# Patient Record
Sex: MALE | Race: OTHER | NOT HISPANIC OR LATINO | URBAN - METROPOLITAN AREA
[De-identification: names, ages, dates, MRNs, and addresses within clinical notes are randomized per-mention and may not be internally consistent; named-entity substitution may affect disease eponyms.]

---

## 2021-02-08 PROBLEM — Z00.00 ENCOUNTER FOR PREVENTIVE HEALTH EXAMINATION: Status: ACTIVE | Noted: 2021-02-08

## 2022-02-11 ENCOUNTER — OUTPATIENT (OUTPATIENT)
Dept: OUTPATIENT SERVICES | Facility: HOSPITAL | Age: 82
LOS: 1 days | End: 2022-02-11
Payer: COMMERCIAL

## 2022-02-11 VITALS
TEMPERATURE: 98 F | RESPIRATION RATE: 16 BRPM | SYSTOLIC BLOOD PRESSURE: 126 MMHG | WEIGHT: 179.9 LBS | DIASTOLIC BLOOD PRESSURE: 64 MMHG | HEART RATE: 73 BPM | OXYGEN SATURATION: 96 % | HEIGHT: 64 IN

## 2022-02-11 DIAGNOSIS — I10 ESSENTIAL (PRIMARY) HYPERTENSION: ICD-10-CM

## 2022-02-11 DIAGNOSIS — G47.33 OBSTRUCTIVE SLEEP APNEA (ADULT) (PEDIATRIC): ICD-10-CM

## 2022-02-11 DIAGNOSIS — E03.9 HYPOTHYROIDISM, UNSPECIFIED: ICD-10-CM

## 2022-02-11 DIAGNOSIS — I25.10 ATHEROSCLEROTIC HEART DISEASE OF NATIVE CORONARY ARTERY WITHOUT ANGINA PECTORIS: ICD-10-CM

## 2022-02-11 DIAGNOSIS — N18.6 END STAGE RENAL DISEASE: ICD-10-CM

## 2022-02-11 DIAGNOSIS — Z01.818 ENCOUNTER FOR OTHER PREPROCEDURAL EXAMINATION: ICD-10-CM

## 2022-02-11 DIAGNOSIS — E11.9 TYPE 2 DIABETES MELLITUS WITHOUT COMPLICATIONS: ICD-10-CM

## 2022-02-11 DIAGNOSIS — Z90.49 ACQUIRED ABSENCE OF OTHER SPECIFIED PARTS OF DIGESTIVE TRACT: Chronic | ICD-10-CM

## 2022-02-11 DIAGNOSIS — Z98.890 OTHER SPECIFIED POSTPROCEDURAL STATES: Chronic | ICD-10-CM

## 2022-02-11 DIAGNOSIS — M10.9 GOUT, UNSPECIFIED: ICD-10-CM

## 2022-02-11 DIAGNOSIS — Z29.9 ENCOUNTER FOR PROPHYLACTIC MEASURES, UNSPECIFIED: ICD-10-CM

## 2022-02-11 LAB
ANION GAP SERPL CALC-SCNC: 6 MMOL/L — SIGNIFICANT CHANGE UP (ref 5–17)
APTT BLD: 32.1 SEC — SIGNIFICANT CHANGE UP (ref 27.5–35.5)
BUN SERPL-MCNC: 39 MG/DL — HIGH (ref 7–18)
CALCIUM SERPL-MCNC: 9.1 MG/DL — SIGNIFICANT CHANGE UP (ref 8.4–10.5)
CHLORIDE SERPL-SCNC: 100 MMOL/L — SIGNIFICANT CHANGE UP (ref 96–108)
CO2 SERPL-SCNC: 31 MMOL/L — SIGNIFICANT CHANGE UP (ref 22–31)
CREAT SERPL-MCNC: 5.49 MG/DL — HIGH (ref 0.5–1.3)
GLUCOSE SERPL-MCNC: 129 MG/DL — HIGH (ref 70–99)
HCT VFR BLD CALC: 34.4 % — LOW (ref 39–50)
HGB BLD-MCNC: 11.5 G/DL — LOW (ref 13–17)
INR BLD: 0.92 RATIO — SIGNIFICANT CHANGE UP (ref 0.88–1.16)
MCHC RBC-ENTMCNC: 30.7 PG — SIGNIFICANT CHANGE UP (ref 27–34)
MCHC RBC-ENTMCNC: 33.4 GM/DL — SIGNIFICANT CHANGE UP (ref 32–36)
MCV RBC AUTO: 92 FL — SIGNIFICANT CHANGE UP (ref 80–100)
NRBC # BLD: 0 /100 WBCS — SIGNIFICANT CHANGE UP (ref 0–0)
PLATELET # BLD AUTO: 200 K/UL — SIGNIFICANT CHANGE UP (ref 150–400)
POTASSIUM SERPL-MCNC: 4.7 MMOL/L — SIGNIFICANT CHANGE UP (ref 3.5–5.3)
POTASSIUM SERPL-SCNC: 4.7 MMOL/L — SIGNIFICANT CHANGE UP (ref 3.5–5.3)
PROTHROM AB SERPL-ACNC: 11 SEC — SIGNIFICANT CHANGE UP (ref 10.6–13.6)
RBC # BLD: 3.74 M/UL — LOW (ref 4.2–5.8)
RBC # FLD: 13.2 % — SIGNIFICANT CHANGE UP (ref 10.3–14.5)
SODIUM SERPL-SCNC: 137 MMOL/L — SIGNIFICANT CHANGE UP (ref 135–145)
WBC # BLD: 8.2 K/UL — SIGNIFICANT CHANGE UP (ref 3.8–10.5)
WBC # FLD AUTO: 8.2 K/UL — SIGNIFICANT CHANGE UP (ref 3.8–10.5)

## 2022-02-11 PROCEDURE — 36415 COLL VENOUS BLD VENIPUNCTURE: CPT

## 2022-02-11 PROCEDURE — 87640 STAPH A DNA AMP PROBE: CPT

## 2022-02-11 PROCEDURE — 80048 BASIC METABOLIC PNL TOTAL CA: CPT

## 2022-02-11 PROCEDURE — G0463: CPT

## 2022-02-11 PROCEDURE — 85027 COMPLETE CBC AUTOMATED: CPT

## 2022-02-11 PROCEDURE — 87641 MR-STAPH DNA AMP PROBE: CPT

## 2022-02-11 PROCEDURE — 71046 X-RAY EXAM CHEST 2 VIEWS: CPT | Mod: 26

## 2022-02-11 PROCEDURE — 85610 PROTHROMBIN TIME: CPT

## 2022-02-11 PROCEDURE — 71046 X-RAY EXAM CHEST 2 VIEWS: CPT

## 2022-02-11 PROCEDURE — 93005 ELECTROCARDIOGRAM TRACING: CPT

## 2022-02-11 PROCEDURE — 85730 THROMBOPLASTIN TIME PARTIAL: CPT

## 2022-02-11 RX ORDER — SODIUM CHLORIDE 9 MG/ML
3 INJECTION INTRAMUSCULAR; INTRAVENOUS; SUBCUTANEOUS EVERY 8 HOURS
Refills: 0 | Status: DISCONTINUED | OUTPATIENT
Start: 2022-02-18 | End: 2022-02-25

## 2022-02-11 NOTE — H&P PST ADULT - PROBLEM SELECTOR PLAN 8
Pt had one cardia stent placed 6 years ago on asa and Plavix. Pt to continue asa am of surgery and stop Plavix three days before surgery. Pt will follow up with PCP post procedure.

## 2022-02-11 NOTE — H&P PST ADULT - PROBLEM SELECTOR PLAN 2
Pt to continue the regime of Metoprolol Amlodipine to be taken with sip of water am of surgery. Pt to follow up with PCP after surgery

## 2022-02-11 NOTE — H&P PST ADULT - PROBLEM SELECTOR PLAN 4
Pt told not to take diabetic medication am of surgery given on written instructions and will monitor blood glucose day of surgery. Pt will continue with medication after surgery as before and follow up with PCP

## 2022-02-11 NOTE — H&P PST ADULT - NSICDXPASTSURGICALHX_GEN_ALL_CORE_FT
PAST SURGICAL HISTORY:  History of cholecystectomy     S/P arteriovenous (AV) fistula creation 1 yr ago

## 2022-02-11 NOTE — H&P PST ADULT - HISTORY OF PRESENT ILLNESS
[age] yr old male with history of CAD (one cardiac stent 6 years ago plavix) hypothyroid (levothyroxine) HDL (atorvastatin) diabetes (glipizide Tradjenta )gout Allopurinol, high blood pressure (Metoprolol) ESRD had right upper arm av fistula placed has positive bruit and thrill however it is difficult to have dialysis due to position of the graft. Pt is receiving  hemo via right chest wall catheter (T<TH SAT) Pt is schedule for right arm basilic transposition AV  fistula creation on 2/18/2022. Pt walks with cane due to arthritis of the hip.

## 2022-02-11 NOTE — H&P PST ADULT - PROBLEM SELECTOR PLAN 3
Pt to continue taking Levothyroxine am of surgery with sip of water and follow up with pcp post procedure

## 2022-02-11 NOTE — H&P PST ADULT - NSICDXPASTMEDICALHX_GEN_ALL_CORE_FT
PAST MEDICAL HISTORY:  CAD (coronary artery disease) one stent 6 yrs    DM (diabetes mellitus)     End stage renal disease     Gout     History of BPH     HLD (hyperlipidemia)     HTN (hypertension)

## 2022-02-11 NOTE — H&P PST ADULT - PROBLEM SELECTOR PLAN 1
pt given written and verbal instructions on the 4% chlorhexidene wash on the day of surgery 2/18/2022

## 2022-02-11 NOTE — H&P PST ADULT - NSICDXFAMILYHX_GEN_ALL_CORE_FT
FAMILY HISTORY:  Father  Still living? No  Known health problems: none, Age at diagnosis: Age Unknown    Mother  Still living? No  Known health problems: none, Age at diagnosis: Age Unknown

## 2022-02-11 NOTE — H&P PST ADULT - ASSESSMENT
81 yr old male with history of CAD (one cardiac stent 6 years ago plavix) hypothyroid (levothyroxine) HDL (atorvastatin) diabetes (glipizide Tradjenta )gout Allopurinol, high blood pressure (Metoprolol) ESRD had right upper arm av fistula placed has positive bruit and thrill however it is difficult to have dialysis due to position of the graft. Pt is receiving  hemo via right chest wall catheter (T<TH SAT) Pt is schedule for right arm basilic transposition AV  fistula creation on 2/18/2022. Pt walks with cane due to arthritis of the hip.

## 2022-02-12 LAB
MRSA PCR RESULT.: SIGNIFICANT CHANGE UP
S AUREUS DNA NOSE QL NAA+PROBE: SIGNIFICANT CHANGE UP

## 2022-02-15 NOTE — CONSULT NOTE ADULT - SUBJECTIVE AND OBJECTIVE BOX
DATE OF SERVICE:  02/15/2022  Patient was seen,examined and evaluated  by me.ER evaluation, Labs and Hospital course was reviewed,    CHIEF COMPLAINT:AVF    HPI:81 yr old male with history of CAD (one cardiac stent 6 years ago plavix) hypothyroid (levothyroxine) HDL (atorvastatin) diabetes (glipizide Tradjenta )gout Allopurinol, high blood pressure (Metoprolol) ESRD had right upper arm av fistula placed has positive bruit and thrill however it is difficult to have dialysis due to position of the graft. Pt is receiving  hemo via right chest wall catheter (T<TH SAT) Pt is schedule for right arm basilic transposition AV  fistula creation on 2/18/2022. Pt walks with cane due to arthritis of the hip.  Walks with a cane 3-4 blocks limited due to back pain no chest pain dyspnea    PAST MEDICAL & SURGICAL HISTORY:  HTN (hypertension)    Gout    DM (diabetes mellitus)    History of BPH    HLD (hyperlipidemia)    CAD (coronary artery disease)  one stent 6 yrs    End stage renal disease    History of cholecystectomy    S/P arteriovenous (AV) fistula creation  1 yr ago        MEDICATIONS  (STANDING):  · 	allopurinol 100 mg oral tablet: Last Dose Taken:  , 1 tab(s) orally 2 times a day  · 	Low Dose ASA 81 mg oral tablet: Last Dose Taken:  , orally once a day  · 	amLODIPine 5 mg oral tablet: Last Dose Taken:  , 1 tab(s) orally once a day  · 	clopidogrel 75 mg oral tablet: Last Dose Taken:  , 1 tab(s) orally once a day  · 	calcium acetate 667 mg oral tablet: Last Dose Taken:  , 3 tab(s) orally 3 times a day  · 	Tradjenta 5 mg oral tablet: 1 tab(s) orally once a day  · 	tamsulosin 0.4 mg oral capsule: 1 cap(s) orally once a day  · 	metoprolol succinate 50 mg oral tablet, extended release: 1 tab(s) orally once a day  · 	gabapentin 100 mg oral tablet: Last Dose Taken:  , orally 2 times a day  · 	atorvastatin 40 mg oral tablet: Last Dose Taken:  , 1 tab(s) orally once a day  · 	glipiZIDE 5 mg oral tablet, extended release: 1 tab(s) orally once a day  · 	levothyroxine 50 mcg (0.05 mg) oral tablet: 1 tab(s) orally once a day    FAMILY HISTORY:  Known health problems: none (Father, Mother)      No family history of premature coronary artery disease or sudden cardiac death    SOCIAL HISTORY:  Smoking-[ ] Active  [ ] Former [x ] Non Smoker  Alcohol-[x ] Denies [ ] Social [ ] Daily  Ilicit Drug use-[x ] Denies [ ] Active user    REVIEW OF SYSTEMS:  Constitutional: [ ] fever, [ ]weight loss, [x ]fatigue   Activity [ ] Bedbound,[x ] Ambulates [ ] Unassisted[x ] Cane/Walker [ ] Assistence.  Effort tolerance:[ ] Excellent [ ] Good [ ] Fair [ ] Poor [ ]  Eyes: [ ] visual changes  Respiratory: [ ]shortness of breath;  [ ] cough, [ ]wheezing, [ ]chills, [ ]hemoptysis  Cardiovascular: [ ] chest pain, [ ]palpitations, [ ]dizziness,  [ ]leg swelling[ ]orthopnea [ ]PND  Gastrointestinal: [ ] abdominal pain, [ ]nausea, [ ]vomiting,  [ ]diarrhea,[ ]constipation  Genitourinary: [ ] dysuria, [ ] hematuria  Neurologic: [ ] headaches [ ] tremors[ ] weakness  Skin: [ ] itching, [ ]burning, [ ] rashes  Endocrine: [ ] heat or cold intolerance  Musculoskeletal: [ ] joint pain or swelling; [ ] muscle, back, or extremity pain  Psychiatric: [ ] depression, [ ]anxiety, [ ]mood swings, or [ ]difficulty sleeping  Hematologic: [ ] easy bruising, [ ] bleeding gums       [ x] All others negative	  [ ] Unable to obtain    Vital Signs Last 24 Hrs  · Temp (F)	98.2 Degrees F  · Temp (C)	36.8 Degrees C  · Heart Rate	73 /min  · Respiration Rate (breaths/min)	16 /min  · BP Systolic	126 mm Hg  · BP Diastolic	64 mm Hg  · Blood Pressure - Method	electronic  · BP Noninvasive Mean	84 mm Hg  · SpO2 (%)	96 %  · O2 Delivery/Oxygen Delivery Method	room air      PHYSICAL EXAM:  General: No acute distress BMI-28  HEENT: EOMI, PERRL[ ] Icteric  Neck: Supple, No JVD  Lungs: Equal air entry bilaterally; [ ] Rales [ ] Rhonchi [ ] Wheezing  Heart: Regular rate and rhythm;[x ] Murmurs-  2 /6 [x ] Systolic [ ] Diastolic [ ] Radiation,No rubs, or gallops  Abdomen: Nontender, bowel sounds present  Extremities: No clubbing, cyanosis, or edema[ ] Calf tenderness  Nervous system:  Alert & Oriented X3, no focal deficits  Psychiatric: Normal affect  Skin: No rashes or lesions      LABS:        Creatinine Trend: 5.49<--        Complete Blood Count (02.11.22 @ 15:11)   Nucleated RBC: 0 /100 WBCs WBC Count: 8.20 K/uL   RBC Count: 3.74 M/uL   Hemoglobin: 11.5 g/dL   Hematocrit: 34.4 %   Mean Cell Volume: 92.0 fl   Mean Cell Hemoglobin: 30.7 pg   Mean Cell Hemoglobin Conc: 33.4 gm/dL   Platelet Count - Automated: 200 K/uL     Basic Metabolic Panel (02.11.22 @ 15:11)   Sodium, Serum: 137 mmol/L   Potassium, Serum: 4.7 mmol/L   Chloride, Serum: 100 mmol/L   Carbon Dioxide, Serum: 31 mmol/L   Anion Gap, Serum: 6 mmol/L   Blood Urea Nitrogen, Serum: 39 mg/dL   Creatinine, Serum: 5.49 mg/dL   Glucose, Serum: 129 mg/dL   Calcium, Total Serum: 9.1 mg/dL   eGFR if Non : 9:  ,  RADIOLOGY:CXR  IMPRESSION:  No radiographic evidence of active chest disease..      ECG [my interpretation]: Normal sinus rhythm at 70 BPM  Left axis deviation  Inferior infarct , age undetermined  Anteroseptal infarct , age undetermined  Abnormal ECG

## 2022-02-15 NOTE — CONSULT NOTE ADULT - TIME BILLING
- Review of records, telemetry, vital signs and daily labs.   - General and cardiovascular physical examination.  - Generation of cardiovascular treatment plan.  - Coordination of care.      Patient was seen and examined by me on 02/15/2022,interim events noted,labs and radiology studies reviewed.  Sheldon Burrows MD,FACC.  [address] [place], [place] [id number].  [phone]

## 2022-02-15 NOTE — CONSULT NOTE ADULT - ASSESSMENT
81 yr old male with history of CAD (one cardiac stent 6 years ago plavix) hypothyroid (levothyroxine) HDL (atorvastatin) diabetes (glipizide Tradjenta )gout Allopurinol, high blood pressure (Metoprolol) ESRD had right upper arm av fistula placed has positive bruit and thrill however it is difficult to have dialysis due to position of the graft. Pt is receiving  hemo via right chest wall catheter (T<TH SAT) Pt is schedule for right arm basilic transposition AV  fistula creation on 2/18/2022. Pt walks with cane due to arthritis of the hip.        Problem/Plan - 1:  ·  Problem: Need for prophylactic measure.   ·  Plan: pt given written and verbal instructions on the 4% chlorhexidene wash on the day of surgery 2/18/2022.    Problem/Plan - 2:  ·  Problem: HTN (hypertension).   ·  Plan: Pt to continue the regime of Metoprolol Amlodipine to be taken with sip of water am of surgery. Pt to follow up with PCP after surgery.    Problem/Plan - 3:  ·  Problem: Hypothyroid.   ·  Plan: Pt to continue taking Levothyroxine am of surgery with sip of water and follow up with pcp post procedure.    Problem/Plan - 4:  ·  Problem: DM (diabetes mellitus).   ·  Plan: Pt told not to take diabetic medication am of surgery given on written instructions and will monitor blood glucose day of surgery. Pt will continue with medication after surgery as before and follow up with PCP.    Problem/Plan - 5:  ·  Problem: Gout.   ·  Plan: Pt to hold medication until after surgery and than continue post procedure and follow up with pcp.    Problem/Plan - 6:  ·  Problem: ESRD on dialysis.   ·  Plan: Schedule for right arm basilic vein transposition AV fistula creation.  Patient's Revised Cardiac Risk Index (RCRI) score is 1 (6 .0 % risk) . Patient is at INTERMEDIATE  risk of  adverse perioperative cardiac events undergoing INTERMEDIATE  risk surgery. No further cardiac testing is needed prior to patient's surgery.       Problem/Plan - 7:  ·  Problem: ARYAN (obstructive sleep apnea).   ·  Plan: stop bang score=3 pt intermediate risk for ARYAN will monitor safely of patent airway.    Problem/Plan - 8:  ·  Problem: CAD (coronary artery disease).   ·  Plan: Pt had one cardia stent placed 6 years ago on asa and Plavix. Pt to continue asa am of surgery and stop Plavix three days before surgery.

## 2022-02-17 ENCOUNTER — TRANSCRIPTION ENCOUNTER (OUTPATIENT)
Age: 82
End: 2022-02-17

## 2022-02-18 ENCOUNTER — OUTPATIENT (OUTPATIENT)
Dept: OUTPATIENT SERVICES | Facility: HOSPITAL | Age: 82
LOS: 1 days | End: 2022-02-18
Payer: COMMERCIAL

## 2022-02-18 VITALS
SYSTOLIC BLOOD PRESSURE: 139 MMHG | TEMPERATURE: 99 F | DIASTOLIC BLOOD PRESSURE: 71 MMHG | RESPIRATION RATE: 16 BRPM | OXYGEN SATURATION: 96 % | HEART RATE: 70 BPM

## 2022-02-18 VITALS
HEIGHT: 64 IN | TEMPERATURE: 98 F | WEIGHT: 179.9 LBS | HEART RATE: 89 BPM | SYSTOLIC BLOOD PRESSURE: 152 MMHG | RESPIRATION RATE: 16 BRPM | OXYGEN SATURATION: 95 % | DIASTOLIC BLOOD PRESSURE: 75 MMHG

## 2022-02-18 DIAGNOSIS — Z98.890 OTHER SPECIFIED POSTPROCEDURAL STATES: Chronic | ICD-10-CM

## 2022-02-18 DIAGNOSIS — Z90.49 ACQUIRED ABSENCE OF OTHER SPECIFIED PARTS OF DIGESTIVE TRACT: Chronic | ICD-10-CM

## 2022-02-18 DIAGNOSIS — N18.6 END STAGE RENAL DISEASE: ICD-10-CM

## 2022-02-18 LAB
ANION GAP SERPL CALC-SCNC: 5 MMOL/L — SIGNIFICANT CHANGE UP (ref 5–17)
BUN SERPL-MCNC: 30 MG/DL — HIGH (ref 7–18)
CALCIUM SERPL-MCNC: 9.2 MG/DL — SIGNIFICANT CHANGE UP (ref 8.4–10.5)
CHLORIDE SERPL-SCNC: 99 MMOL/L — SIGNIFICANT CHANGE UP (ref 96–108)
CO2 SERPL-SCNC: 32 MMOL/L — HIGH (ref 22–31)
CREAT SERPL-MCNC: 5.38 MG/DL — HIGH (ref 0.5–1.3)
GLUCOSE BLDC GLUCOMTR-MCNC: 130 MG/DL — HIGH (ref 70–99)
GLUCOSE BLDC GLUCOMTR-MCNC: 135 MG/DL — HIGH (ref 70–99)
GLUCOSE SERPL-MCNC: 133 MG/DL — HIGH (ref 70–99)
POTASSIUM SERPL-MCNC: 5.2 MMOL/L — SIGNIFICANT CHANGE UP (ref 3.5–5.3)
POTASSIUM SERPL-SCNC: 5.2 MMOL/L — SIGNIFICANT CHANGE UP (ref 3.5–5.3)
SARS-COV-2 RNA SPEC QL NAA+PROBE: SIGNIFICANT CHANGE UP
SODIUM SERPL-SCNC: 136 MMOL/L — SIGNIFICANT CHANGE UP (ref 135–145)

## 2022-02-18 PROCEDURE — 87635 SARS-COV-2 COVID-19 AMP PRB: CPT

## 2022-02-18 PROCEDURE — 36415 COLL VENOUS BLD VENIPUNCTURE: CPT

## 2022-02-18 PROCEDURE — 36819 AV FUSE UPPR ARM BASILIC: CPT | Mod: RT

## 2022-02-18 PROCEDURE — 82962 GLUCOSE BLOOD TEST: CPT

## 2022-02-18 PROCEDURE — C1889: CPT

## 2022-02-18 PROCEDURE — 80048 BASIC METABOLIC PNL TOTAL CA: CPT

## 2022-02-18 DEVICE — CLIP APPLIER COVIDIEN SURGICLIP II 9.75" MEDIUM: Type: IMPLANTABLE DEVICE | Site: RIGHT | Status: FUNCTIONAL

## 2022-02-18 DEVICE — CLIP APPLIER COVIDIEN SURGICLIP III 9" SM: Type: IMPLANTABLE DEVICE | Site: RIGHT | Status: FUNCTIONAL

## 2022-02-18 DEVICE — GELFOAM 4-3/4X3/4 SZ4: Type: IMPLANTABLE DEVICE | Site: RIGHT | Status: FUNCTIONAL

## 2022-02-18 DEVICE — SPONGE COLLAGEN AVITENE ULTRA 12.5: Type: IMPLANTABLE DEVICE | Site: RIGHT | Status: FUNCTIONAL

## 2022-02-18 RX ORDER — TRAMADOL HYDROCHLORIDE 50 MG/1
1 TABLET ORAL
Qty: 12 | Refills: 0
Start: 2022-02-18 | End: 2022-02-20

## 2022-02-18 RX ORDER — CALCIUM ACETATE 667 MG
3 TABLET ORAL
Qty: 0 | Refills: 0 | DISCHARGE

## 2022-02-18 RX ORDER — ASPIRIN/CALCIUM CARB/MAGNESIUM 324 MG
0 TABLET ORAL
Qty: 0 | Refills: 0 | DISCHARGE

## 2022-02-18 RX ORDER — LINAGLIPTIN 5 MG/1
1 TABLET, FILM COATED ORAL
Qty: 0 | Refills: 0 | DISCHARGE

## 2022-02-18 RX ORDER — ATORVASTATIN CALCIUM 80 MG/1
1 TABLET, FILM COATED ORAL
Qty: 0 | Refills: 0 | DISCHARGE

## 2022-02-18 RX ORDER — LEVOTHYROXINE SODIUM 125 MCG
1 TABLET ORAL
Qty: 0 | Refills: 0 | DISCHARGE

## 2022-02-18 RX ORDER — FENTANYL CITRATE 50 UG/ML
25 INJECTION INTRAVENOUS
Refills: 0 | Status: DISCONTINUED | OUTPATIENT
Start: 2022-02-18 | End: 2022-02-18

## 2022-02-18 RX ORDER — TAMSULOSIN HYDROCHLORIDE 0.4 MG/1
1 CAPSULE ORAL
Qty: 0 | Refills: 0 | DISCHARGE

## 2022-02-18 RX ORDER — CLOPIDOGREL BISULFATE 75 MG/1
1 TABLET, FILM COATED ORAL
Qty: 0 | Refills: 0 | DISCHARGE

## 2022-02-18 RX ORDER — GABAPENTIN 400 MG/1
0 CAPSULE ORAL
Qty: 0 | Refills: 0 | DISCHARGE

## 2022-02-18 RX ORDER — AMLODIPINE BESYLATE 2.5 MG/1
1 TABLET ORAL
Qty: 0 | Refills: 0 | DISCHARGE

## 2022-02-18 RX ORDER — ALLOPURINOL 300 MG
1 TABLET ORAL
Qty: 0 | Refills: 0 | DISCHARGE

## 2022-02-18 RX ORDER — METOPROLOL TARTRATE 50 MG
1 TABLET ORAL
Qty: 0 | Refills: 0 | DISCHARGE

## 2022-02-18 RX ORDER — CHLORHEXIDINE GLUCONATE 213 G/1000ML
1 SOLUTION TOPICAL ONCE
Refills: 0 | Status: COMPLETED | OUTPATIENT
Start: 2022-02-18 | End: 2022-02-18

## 2022-02-18 RX ADMIN — CHLORHEXIDINE GLUCONATE 1 APPLICATION(S): 213 SOLUTION TOPICAL at 07:19

## 2022-02-18 NOTE — ASU PATIENT PROFILE, ADULT - FALL HARM RISK - UNIVERSAL INTERVENTIONS
Bed in lowest position, wheels locked, appropriate side rails in place/Call bell, personal items and telephone in reach/Instruct patient to call for assistance before getting out of bed or chair/Non-slip footwear when patient is out of bed/[place] to call system/Physically safe environment - no spills, clutter or unnecessary equipment/Purposeful Proactive Rounding/Room/bathroom lighting operational, light cord in reach

## 2022-02-18 NOTE — ASU DISCHARGE PLAN (ADULT/PEDIATRIC) - CARE PROVIDER_API CALL
Yenny Razo)  Surgery; Vascular Surgery  [address] [facility], [address]  [place] [address]  Phone: [phone]  Fax: [phone]  Follow Up Time: 1 week

## 2022-02-18 NOTE — ASU DISCHARGE PLAN (ADULT/PEDIATRIC) - NS MD DC FALL RISK RISK
For information on Fall & Injury Prevention, visit: [email or web address] OR  [email or web address] OR  https://www.cdc.gov/steadi/patient.html

## 2022-02-18 NOTE — ASU PREOP CHECKLIST - SELECT TESTS ORDERED
BMP sent stat preop as per NP order/BMP/Results in MD note/COVID-19 BMP sent stat preop as per NP order/BMP/Results in MD note/POCT Blood Glucose/COVID-19

## 2022-02-18 NOTE — ASU DISCHARGE PLAN (ADULT/PEDIATRIC) - ASU DC SPECIAL INSTRUCTIONSFT
Please take over the counter pain medications as needed for pain.  Please take tylenol and motrin.  Take 650mg Tylenol every 6 hours as needed for pain.  Please take motrin 600mg every 6 hours as needed for pain.  Do not exceed more than 4000mg Tylenol in 24 hours.  Do not exceed more than 2400mg Motrin in 24 hours.      Continue to use permacath for dialysis access.  Do not use AV Fistula until told to do so in clinic.      Keep dressing in place until seen in clinic. Please take over the counter pain medications as needed for pain.  Please take tylenol and motrin.  Take 650mg Tylenol every 6 hours as needed for pain.  Please take motrin 600mg every 6 hours as needed for pain.  Do not exceed more than 4000mg Tylenol in 24 hours.  Do not exceed more than 2400mg Motrin in 24 hours.      If pain is not well controlled with Tylenol/Motrin, please take Tramadol 50mg every 6 hours as needed for more severe pain.  This has been sent to your phamacy    Continue to use permacath for dialysis access.  Do not use AV Fistula until told to do so in clinic.      Keep dressing in place until seen in clinic.

## 2022-02-18 NOTE — ASU PREOP CHECKLIST - IV STARTED
unable to place IV lock (difficult stick) --to be done by anethesiologist as per protocol (MARIETTA Henley made aware preop)/no

## 2025-02-24 ENCOUNTER — INPATIENT (INPATIENT)
Facility: HOSPITAL | Age: 85
LOS: 13 days | Discharge: NURSING, REHAB OR LONG-TERM CARE | End: 2025-03-10
Attending: STUDENT IN AN ORGANIZED HEALTH CARE EDUCATION/TRAINING PROGRAM | Admitting: THORACIC SURGERY (CARDIOTHORACIC VASCULAR SURGERY)
Payer: COMMERCIAL

## 2025-02-24 ENCOUNTER — RESULT REVIEW (OUTPATIENT)
Age: 85
End: 2025-02-24

## 2025-02-24 VITALS
RESPIRATION RATE: 14 BRPM | SYSTOLIC BLOOD PRESSURE: 176 MMHG | HEIGHT: 64 IN | OXYGEN SATURATION: 98 % | DIASTOLIC BLOOD PRESSURE: 75 MMHG | WEIGHT: 181.66 LBS | HEART RATE: 83 BPM

## 2025-02-24 DIAGNOSIS — Z98.890 OTHER SPECIFIED POSTPROCEDURAL STATES: Chronic | ICD-10-CM

## 2025-02-24 DIAGNOSIS — Z90.49 ACQUIRED ABSENCE OF OTHER SPECIFIED PARTS OF DIGESTIVE TRACT: Chronic | ICD-10-CM

## 2025-02-24 PROBLEM — I25.10 ATHEROSCLEROTIC HEART DISEASE OF NATIVE CORONARY ARTERY WITHOUT ANGINA PECTORIS: Chronic | Status: ACTIVE | Noted: 2022-02-11

## 2025-02-24 PROBLEM — I10 ESSENTIAL (PRIMARY) HYPERTENSION: Chronic | Status: ACTIVE | Noted: 2022-02-11

## 2025-02-24 PROBLEM — N18.6 END STAGE RENAL DISEASE: Chronic | Status: ACTIVE | Noted: 2022-02-11

## 2025-02-24 PROBLEM — M10.9 GOUT, UNSPECIFIED: Chronic | Status: ACTIVE | Noted: 2022-02-11

## 2025-02-24 PROBLEM — E11.9 TYPE 2 DIABETES MELLITUS WITHOUT COMPLICATIONS: Chronic | Status: ACTIVE | Noted: 2022-02-11

## 2025-02-24 PROBLEM — E78.5 HYPERLIPIDEMIA, UNSPECIFIED: Chronic | Status: ACTIVE | Noted: 2022-02-11

## 2025-02-24 PROBLEM — Z87.438 PERSONAL HISTORY OF OTHER DISEASES OF MALE GENITAL ORGANS: Chronic | Status: ACTIVE | Noted: 2022-02-11

## 2025-02-24 LAB
A1C WITH ESTIMATED AVERAGE GLUCOSE RESULT: 7.7 % — HIGH (ref 4–5.6)
ALBUMIN SERPL ELPH-MCNC: 3.4 G/DL — SIGNIFICANT CHANGE UP (ref 3.3–5)
ALBUMIN SERPL ELPH-MCNC: 3.8 G/DL — SIGNIFICANT CHANGE UP (ref 3.3–5)
ALBUMIN SERPL ELPH-MCNC: 3.9 G/DL — SIGNIFICANT CHANGE UP (ref 3.3–5)
ALBUMIN SERPL ELPH-MCNC: 3.9 G/DL — SIGNIFICANT CHANGE UP (ref 3.3–5)
ALBUMIN SERPL ELPH-MCNC: 4.1 G/DL — SIGNIFICANT CHANGE UP (ref 3.3–5)
ALP SERPL-CCNC: 172 U/L — HIGH (ref 40–120)
ALP SERPL-CCNC: 173 U/L — HIGH (ref 40–120)
ALP SERPL-CCNC: 174 U/L — HIGH (ref 40–120)
ALP SERPL-CCNC: 182 U/L — HIGH (ref 40–120)
ALP SERPL-CCNC: 196 U/L — HIGH (ref 40–120)
ALT FLD-CCNC: 18 U/L — SIGNIFICANT CHANGE UP (ref 10–45)
ALT FLD-CCNC: 19 U/L — SIGNIFICANT CHANGE UP (ref 10–45)
ALT FLD-CCNC: 19 U/L — SIGNIFICANT CHANGE UP (ref 10–45)
ALT FLD-CCNC: 20 U/L — SIGNIFICANT CHANGE UP (ref 10–45)
ALT FLD-CCNC: 23 U/L — SIGNIFICANT CHANGE UP (ref 10–45)
ANION GAP SERPL CALC-SCNC: 14 MMOL/L — SIGNIFICANT CHANGE UP (ref 5–17)
ANION GAP SERPL CALC-SCNC: 17 MMOL/L — SIGNIFICANT CHANGE UP (ref 5–17)
ANION GAP SERPL CALC-SCNC: 18 MMOL/L — HIGH (ref 5–17)
ANION GAP SERPL CALC-SCNC: 19 MMOL/L — HIGH (ref 5–17)
ANION GAP SERPL CALC-SCNC: 20 MMOL/L — HIGH (ref 5–17)
APPEARANCE UR: CLEAR — SIGNIFICANT CHANGE UP
APTT BLD: 54.9 SEC — HIGH (ref 24.5–35.6)
APTT BLD: 55 SEC — HIGH (ref 24.5–35.6)
APTT BLD: 67.3 SEC — HIGH (ref 24.5–35.6)
APTT BLD: 74.2 SEC — HIGH (ref 24.5–35.6)
AST SERPL-CCNC: 15 U/L — SIGNIFICANT CHANGE UP (ref 10–40)
AST SERPL-CCNC: 16 U/L — SIGNIFICANT CHANGE UP (ref 10–40)
AST SERPL-CCNC: 16 U/L — SIGNIFICANT CHANGE UP (ref 10–40)
AST SERPL-CCNC: 17 U/L — SIGNIFICANT CHANGE UP (ref 10–40)
AST SERPL-CCNC: 20 U/L — SIGNIFICANT CHANGE UP (ref 10–40)
BASOPHILS # BLD AUTO: 0.02 K/UL — SIGNIFICANT CHANGE UP (ref 0–0.2)
BASOPHILS NFR BLD AUTO: 0.3 % — SIGNIFICANT CHANGE UP (ref 0–2)
BILIRUB SERPL-MCNC: 0.5 MG/DL — SIGNIFICANT CHANGE UP (ref 0.2–1.2)
BILIRUB SERPL-MCNC: 0.5 MG/DL — SIGNIFICANT CHANGE UP (ref 0.2–1.2)
BILIRUB SERPL-MCNC: 0.6 MG/DL — SIGNIFICANT CHANGE UP (ref 0.2–1.2)
BILIRUB SERPL-MCNC: 0.6 MG/DL — SIGNIFICANT CHANGE UP (ref 0.2–1.2)
BILIRUB SERPL-MCNC: 0.7 MG/DL — SIGNIFICANT CHANGE UP (ref 0.2–1.2)
BILIRUB UR-MCNC: NEGATIVE — SIGNIFICANT CHANGE UP
BLD GP AB SCN SERPL QL: NEGATIVE — SIGNIFICANT CHANGE UP
BLD GP AB SCN SERPL QL: NEGATIVE — SIGNIFICANT CHANGE UP
BUN SERPL-MCNC: 16 MG/DL — SIGNIFICANT CHANGE UP (ref 7–23)
BUN SERPL-MCNC: 40 MG/DL — HIGH (ref 7–23)
BUN SERPL-MCNC: 43 MG/DL — HIGH (ref 7–23)
BUN SERPL-MCNC: 43 MG/DL — HIGH (ref 7–23)
BUN SERPL-MCNC: 45 MG/DL — HIGH (ref 7–23)
CALCIUM SERPL-MCNC: 8.6 MG/DL — SIGNIFICANT CHANGE UP (ref 8.4–10.5)
CALCIUM SERPL-MCNC: 8.9 MG/DL — SIGNIFICANT CHANGE UP (ref 8.4–10.5)
CALCIUM SERPL-MCNC: 9 MG/DL — SIGNIFICANT CHANGE UP (ref 8.4–10.5)
CALCIUM SERPL-MCNC: 9 MG/DL — SIGNIFICANT CHANGE UP (ref 8.4–10.5)
CALCIUM SERPL-MCNC: 9.1 MG/DL — SIGNIFICANT CHANGE UP (ref 8.4–10.5)
CHLORIDE SERPL-SCNC: 90 MMOL/L — LOW (ref 96–108)
CHLORIDE SERPL-SCNC: 91 MMOL/L — LOW (ref 96–108)
CHLORIDE SERPL-SCNC: 91 MMOL/L — LOW (ref 96–108)
CHLORIDE SERPL-SCNC: 94 MMOL/L — LOW (ref 96–108)
CHLORIDE SERPL-SCNC: 96 MMOL/L — SIGNIFICANT CHANGE UP (ref 96–108)
CHOLEST SERPL-MCNC: 150 MG/DL — SIGNIFICANT CHANGE UP
CK MB CFR SERPL CALC: 4.2 NG/ML — SIGNIFICANT CHANGE UP (ref 0–6.7)
CK MB CFR SERPL CALC: 6.2 NG/ML — SIGNIFICANT CHANGE UP (ref 0–6.7)
CK SERPL-CCNC: 96 U/L — SIGNIFICANT CHANGE UP (ref 30–200)
CO2 SERPL-SCNC: 26 MMOL/L — SIGNIFICANT CHANGE UP (ref 22–31)
CO2 SERPL-SCNC: 28 MMOL/L — SIGNIFICANT CHANGE UP (ref 22–31)
CO2 SERPL-SCNC: 28 MMOL/L — SIGNIFICANT CHANGE UP (ref 22–31)
CO2 SERPL-SCNC: 29 MMOL/L — SIGNIFICANT CHANGE UP (ref 22–31)
CO2 SERPL-SCNC: 29 MMOL/L — SIGNIFICANT CHANGE UP (ref 22–31)
COLOR SPEC: YELLOW — SIGNIFICANT CHANGE UP
CREAT SERPL-MCNC: 10.34 MG/DL — HIGH (ref 0.5–1.3)
CREAT SERPL-MCNC: 4.21 MG/DL — HIGH (ref 0.5–1.3)
CREAT SERPL-MCNC: 9.59 MG/DL — HIGH (ref 0.5–1.3)
CREAT SERPL-MCNC: 9.95 MG/DL — HIGH (ref 0.5–1.3)
CREAT SERPL-MCNC: 9.96 MG/DL — HIGH (ref 0.5–1.3)
DIFF PNL FLD: NEGATIVE — SIGNIFICANT CHANGE UP
EGFR: 13 ML/MIN/1.73M2 — LOW
EGFR: 13 ML/MIN/1.73M2 — LOW
EGFR: 5 ML/MIN/1.73M2 — LOW
EOSINOPHIL # BLD AUTO: 0.31 K/UL — SIGNIFICANT CHANGE UP (ref 0–0.5)
EOSINOPHIL NFR BLD AUTO: 4.3 % — SIGNIFICANT CHANGE UP (ref 0–6)
ESTIMATED AVERAGE GLUCOSE: 174 MG/DL — HIGH (ref 68–114)
GLUCOSE SERPL-MCNC: 144 MG/DL — HIGH (ref 70–99)
GLUCOSE SERPL-MCNC: 147 MG/DL — HIGH (ref 70–99)
GLUCOSE SERPL-MCNC: 184 MG/DL — HIGH (ref 70–99)
GLUCOSE SERPL-MCNC: 191 MG/DL — HIGH (ref 70–99)
GLUCOSE SERPL-MCNC: 214 MG/DL — HIGH (ref 70–99)
GLUCOSE UR QL: 250 MG/DL
HBV SURFACE AB SER-ACNC: REACTIVE — SIGNIFICANT CHANGE UP
HBV SURFACE AG SER-ACNC: SIGNIFICANT CHANGE UP
HCT VFR BLD CALC: 27.9 % — LOW (ref 39–50)
HCT VFR BLD CALC: 28.2 % — LOW (ref 39–50)
HCT VFR BLD CALC: 29.5 % — LOW (ref 39–50)
HCT VFR BLD CALC: 30.5 % — LOW (ref 39–50)
HCT VFR BLD CALC: 30.8 % — LOW (ref 39–50)
HCV AB S/CO SERPL IA: 0.05 S/CO — SIGNIFICANT CHANGE UP
HCV AB SERPL-IMP: SIGNIFICANT CHANGE UP
HDLC SERPL-MCNC: 31 MG/DL — LOW
HGB BLD-MCNC: 10 G/DL — LOW (ref 13–17)
HGB BLD-MCNC: 9 G/DL — LOW (ref 13–17)
HGB BLD-MCNC: 9 G/DL — LOW (ref 13–17)
HGB BLD-MCNC: 9.4 G/DL — LOW (ref 13–17)
HGB BLD-MCNC: 9.8 G/DL — LOW (ref 13–17)
IMM GRANULOCYTES NFR BLD AUTO: 0.4 % — SIGNIFICANT CHANGE UP (ref 0–0.9)
INR BLD: 1.01 — SIGNIFICANT CHANGE UP (ref 0.85–1.16)
INR BLD: 1.04 — SIGNIFICANT CHANGE UP (ref 0.85–1.16)
INR BLD: 1.04 — SIGNIFICANT CHANGE UP (ref 0.85–1.16)
KETONES UR-MCNC: NEGATIVE MG/DL — SIGNIFICANT CHANGE UP
LACTATE SERPL-SCNC: 1.2 MMOL/L — SIGNIFICANT CHANGE UP (ref 0.5–2)
LEUKOCYTE ESTERASE UR-ACNC: ABNORMAL
LIPID PNL WITH DIRECT LDL SERPL: 89 MG/DL — SIGNIFICANT CHANGE UP
LYMPHOCYTES # BLD AUTO: 1.72 K/UL — SIGNIFICANT CHANGE UP (ref 1–3.3)
LYMPHOCYTES # BLD AUTO: 24.1 % — SIGNIFICANT CHANGE UP (ref 13–44)
MAGNESIUM SERPL-MCNC: 2 MG/DL — SIGNIFICANT CHANGE UP (ref 1.6–2.6)
MAGNESIUM SERPL-MCNC: 2.4 MG/DL — SIGNIFICANT CHANGE UP (ref 1.6–2.6)
MAGNESIUM SERPL-MCNC: 2.4 MG/DL — SIGNIFICANT CHANGE UP (ref 1.6–2.6)
MAGNESIUM SERPL-MCNC: 2.5 MG/DL — SIGNIFICANT CHANGE UP (ref 1.6–2.6)
MCHC RBC-ENTMCNC: 30.2 PG — SIGNIFICANT CHANGE UP (ref 27–34)
MCHC RBC-ENTMCNC: 30.3 PG — SIGNIFICANT CHANGE UP (ref 27–34)
MCHC RBC-ENTMCNC: 30.6 PG — SIGNIFICANT CHANGE UP (ref 27–34)
MCHC RBC-ENTMCNC: 30.8 PG — SIGNIFICANT CHANGE UP (ref 27–34)
MCHC RBC-ENTMCNC: 31.1 PG — SIGNIFICANT CHANGE UP (ref 27–34)
MCHC RBC-ENTMCNC: 31.8 G/DL — LOW (ref 32–36)
MCHC RBC-ENTMCNC: 31.9 G/DL — LOW (ref 32–36)
MCHC RBC-ENTMCNC: 31.9 G/DL — LOW (ref 32–36)
MCHC RBC-ENTMCNC: 32.3 G/DL — SIGNIFICANT CHANGE UP (ref 32–36)
MCHC RBC-ENTMCNC: 32.8 G/DL — SIGNIFICANT CHANGE UP (ref 32–36)
MCV RBC AUTO: 92.1 FL — SIGNIFICANT CHANGE UP (ref 80–100)
MCV RBC AUTO: 95.4 FL — SIGNIFICANT CHANGE UP (ref 80–100)
MCV RBC AUTO: 96.1 FL — SIGNIFICANT CHANGE UP (ref 80–100)
MCV RBC AUTO: 96.5 FL — SIGNIFICANT CHANGE UP (ref 80–100)
MCV RBC AUTO: 96.6 FL — SIGNIFICANT CHANGE UP (ref 80–100)
MONOCYTES # BLD AUTO: 0.56 K/UL — SIGNIFICANT CHANGE UP (ref 0–0.9)
MONOCYTES NFR BLD AUTO: 7.8 % — SIGNIFICANT CHANGE UP (ref 2–14)
NEUTROPHILS # BLD AUTO: 4.51 K/UL — SIGNIFICANT CHANGE UP (ref 1.8–7.4)
NEUTROPHILS NFR BLD AUTO: 63.1 % — SIGNIFICANT CHANGE UP (ref 43–77)
NITRITE UR-MCNC: NEGATIVE — SIGNIFICANT CHANGE UP
NON HDL CHOLESTEROL: 119 MG/DL — SIGNIFICANT CHANGE UP
NRBC BLD AUTO-RTO: 0 /100 WBCS — SIGNIFICANT CHANGE UP (ref 0–0)
NT-PROBNP SERPL-SCNC: HIGH PG/ML (ref 0–300)
PA ADP PRP-ACNC: 188 PRU — SIGNIFICANT CHANGE UP (ref 182–335)
PA ADP PRP-ACNC: 206 PRU — SIGNIFICANT CHANGE UP (ref 182–335)
PH UR: 8 — SIGNIFICANT CHANGE UP (ref 5–8)
PHOSPHATE SERPL-MCNC: 2.4 MG/DL — LOW (ref 2.5–4.5)
PHOSPHATE SERPL-MCNC: 4.4 MG/DL — SIGNIFICANT CHANGE UP (ref 2.5–4.5)
PHOSPHATE SERPL-MCNC: 4.7 MG/DL — HIGH (ref 2.5–4.5)
PHOSPHATE SERPL-MCNC: 4.8 MG/DL — HIGH (ref 2.5–4.5)
PLATELET # BLD AUTO: 150 K/UL — SIGNIFICANT CHANGE UP (ref 150–400)
PLATELET # BLD AUTO: 160 K/UL — SIGNIFICANT CHANGE UP (ref 150–400)
PLATELET # BLD AUTO: 167 K/UL — SIGNIFICANT CHANGE UP (ref 150–400)
PLATELET # BLD AUTO: 175 K/UL — SIGNIFICANT CHANGE UP (ref 150–400)
PLATELET # BLD AUTO: 179 K/UL — SIGNIFICANT CHANGE UP (ref 150–400)
POTASSIUM SERPL-MCNC: 3.6 MMOL/L — SIGNIFICANT CHANGE UP (ref 3.5–5.3)
POTASSIUM SERPL-MCNC: 5.1 MMOL/L — SIGNIFICANT CHANGE UP (ref 3.5–5.3)
POTASSIUM SERPL-MCNC: 5.2 MMOL/L — SIGNIFICANT CHANGE UP (ref 3.5–5.3)
POTASSIUM SERPL-MCNC: 5.2 MMOL/L — SIGNIFICANT CHANGE UP (ref 3.5–5.3)
POTASSIUM SERPL-MCNC: 5.4 MMOL/L — HIGH (ref 3.5–5.3)
POTASSIUM SERPL-SCNC: 3.6 MMOL/L — SIGNIFICANT CHANGE UP (ref 3.5–5.3)
POTASSIUM SERPL-SCNC: 5.1 MMOL/L — SIGNIFICANT CHANGE UP (ref 3.5–5.3)
POTASSIUM SERPL-SCNC: 5.2 MMOL/L — SIGNIFICANT CHANGE UP (ref 3.5–5.3)
POTASSIUM SERPL-SCNC: 5.2 MMOL/L — SIGNIFICANT CHANGE UP (ref 3.5–5.3)
POTASSIUM SERPL-SCNC: 5.4 MMOL/L — HIGH (ref 3.5–5.3)
PROT SERPL-MCNC: 6.6 G/DL — SIGNIFICANT CHANGE UP (ref 6–8.3)
PROT SERPL-MCNC: 6.7 G/DL — SIGNIFICANT CHANGE UP (ref 6–8.3)
PROT SERPL-MCNC: 6.8 G/DL — SIGNIFICANT CHANGE UP (ref 6–8.3)
PROT SERPL-MCNC: 7 G/DL — SIGNIFICANT CHANGE UP (ref 6–8.3)
PROT SERPL-MCNC: 7.1 G/DL — SIGNIFICANT CHANGE UP (ref 6–8.3)
PROT UR-MCNC: 100 MG/DL
PROTHROM AB SERPL-ACNC: 11.8 SEC — SIGNIFICANT CHANGE UP (ref 9.9–13.4)
PROTHROM AB SERPL-ACNC: 11.9 SEC — SIGNIFICANT CHANGE UP (ref 9.9–13.4)
PROTHROM AB SERPL-ACNC: 12.2 SEC — SIGNIFICANT CHANGE UP (ref 9.9–13.4)
RBC # BLD: 2.89 M/UL — LOW (ref 4.2–5.8)
RBC # BLD: 2.92 M/UL — LOW (ref 4.2–5.8)
RBC # BLD: 3.07 M/UL — LOW (ref 4.2–5.8)
RBC # BLD: 3.23 M/UL — LOW (ref 4.2–5.8)
RBC # BLD: 3.31 M/UL — LOW (ref 4.2–5.8)
RBC # FLD: 13 % — SIGNIFICANT CHANGE UP (ref 10.3–14.5)
RBC # FLD: 13.1 % — SIGNIFICANT CHANGE UP (ref 10.3–14.5)
RBC # FLD: 13.5 % — SIGNIFICANT CHANGE UP (ref 10.3–14.5)
RH IG SCN BLD-IMP: POSITIVE — SIGNIFICANT CHANGE UP
RH IG SCN BLD-IMP: POSITIVE — SIGNIFICANT CHANGE UP
SODIUM SERPL-SCNC: 136 MMOL/L — SIGNIFICANT CHANGE UP (ref 135–145)
SODIUM SERPL-SCNC: 137 MMOL/L — SIGNIFICANT CHANGE UP (ref 135–145)
SODIUM SERPL-SCNC: 137 MMOL/L — SIGNIFICANT CHANGE UP (ref 135–145)
SODIUM SERPL-SCNC: 139 MMOL/L — SIGNIFICANT CHANGE UP (ref 135–145)
SODIUM SERPL-SCNC: 141 MMOL/L — SIGNIFICANT CHANGE UP (ref 135–145)
SP GR SPEC: 1.02 — SIGNIFICANT CHANGE UP (ref 1–1.03)
TRIGL SERPL-MCNC: 170 MG/DL — HIGH
TROPONIN T, HIGH SENSITIVITY RESULT: 801 NG/L — CRITICAL HIGH (ref 0–51)
TROPONIN T, HIGH SENSITIVITY RESULT: 817 NG/L — CRITICAL HIGH (ref 0–51)
TSH SERPL-MCNC: 0.51 UIU/ML — SIGNIFICANT CHANGE UP (ref 0.27–4.2)
UROBILINOGEN FLD QL: 0.2 MG/DL — SIGNIFICANT CHANGE UP (ref 0.2–1)
WBC # BLD: 6.69 K/UL — SIGNIFICANT CHANGE UP (ref 3.8–10.5)
WBC # BLD: 7.15 K/UL — SIGNIFICANT CHANGE UP (ref 3.8–10.5)
WBC # BLD: 7.76 K/UL — SIGNIFICANT CHANGE UP (ref 3.8–10.5)
WBC # BLD: 8.35 K/UL — SIGNIFICANT CHANGE UP (ref 3.8–10.5)
WBC # BLD: 8.65 K/UL — SIGNIFICANT CHANGE UP (ref 3.8–10.5)
WBC # FLD AUTO: 6.69 K/UL — SIGNIFICANT CHANGE UP (ref 3.8–10.5)
WBC # FLD AUTO: 7.15 K/UL — SIGNIFICANT CHANGE UP (ref 3.8–10.5)
WBC # FLD AUTO: 7.76 K/UL — SIGNIFICANT CHANGE UP (ref 3.8–10.5)
WBC # FLD AUTO: 8.35 K/UL — SIGNIFICANT CHANGE UP (ref 3.8–10.5)
WBC # FLD AUTO: 8.65 K/UL — SIGNIFICANT CHANGE UP (ref 3.8–10.5)

## 2025-02-24 PROCEDURE — 93010 ELECTROCARDIOGRAM REPORT: CPT

## 2025-02-24 PROCEDURE — 99223 1ST HOSP IP/OBS HIGH 75: CPT

## 2025-02-24 PROCEDURE — 71045 X-RAY EXAM CHEST 1 VIEW: CPT | Mod: 26

## 2025-02-24 PROCEDURE — 93306 TTE W/DOPPLER COMPLETE: CPT | Mod: 26

## 2025-02-24 PROCEDURE — 93880 EXTRACRANIAL BILAT STUDY: CPT | Mod: 26

## 2025-02-24 PROCEDURE — 99292 CRITICAL CARE ADDL 30 MIN: CPT

## 2025-02-24 PROCEDURE — 94010 BREATHING CAPACITY TEST: CPT | Mod: 26

## 2025-02-24 PROCEDURE — 99291 CRITICAL CARE FIRST HOUR: CPT

## 2025-02-24 PROCEDURE — 71250 CT THORAX DX C-: CPT | Mod: 26

## 2025-02-24 RX ORDER — SODIUM CHLORIDE 9 G/1000ML
1000 INJECTION, SOLUTION INTRAVENOUS
Refills: 0 | Status: DISCONTINUED | OUTPATIENT
Start: 2025-02-24 | End: 2025-02-26

## 2025-02-24 RX ORDER — METOPROLOL SUCCINATE 50 MG/1
12.5 TABLET, EXTENDED RELEASE ORAL EVERY 12 HOURS
Refills: 0 | Status: DISCONTINUED | OUTPATIENT
Start: 2025-02-24 | End: 2025-02-24

## 2025-02-24 RX ORDER — POLYETHYLENE GLYCOL 3350 17 G/17G
17 POWDER, FOR SOLUTION ORAL DAILY
Refills: 0 | Status: DISCONTINUED | OUTPATIENT
Start: 2025-02-24 | End: 2025-02-26

## 2025-02-24 RX ORDER — LEVETIRACETAM 10 MG/ML
500 INJECTION, SOLUTION INTRAVENOUS
Refills: 0 | Status: DISCONTINUED | OUTPATIENT
Start: 2025-02-24 | End: 2025-02-24

## 2025-02-24 RX ORDER — HEPARIN SODIUM 1000 [USP'U]/ML
1060 INJECTION INTRAVENOUS; SUBCUTANEOUS
Qty: 25000 | Refills: 0 | Status: DISCONTINUED | OUTPATIENT
Start: 2025-02-24 | End: 2025-02-26

## 2025-02-24 RX ORDER — TAMSULOSIN HYDROCHLORIDE 0.4 MG/1
0.4 CAPSULE ORAL AT BEDTIME
Refills: 0 | Status: DISCONTINUED | OUTPATIENT
Start: 2025-02-24 | End: 2025-02-26

## 2025-02-24 RX ORDER — DEXTROSE 50 % IN WATER 50 %
25 SYRINGE (ML) INTRAVENOUS ONCE
Refills: 0 | Status: DISCONTINUED | OUTPATIENT
Start: 2025-02-24 | End: 2025-02-26

## 2025-02-24 RX ORDER — LEVETIRACETAM 10 MG/ML
500 INJECTION, SOLUTION INTRAVENOUS
Refills: 0 | Status: DISCONTINUED | OUTPATIENT
Start: 2025-02-24 | End: 2025-02-26

## 2025-02-24 RX ORDER — INFLUENZA A VIRUS A/IDAHO/07/2018 (H1N1) ANTIGEN (MDCK CELL DERIVED, PROPIOLACTONE INACTIVATED, INFLUENZA A VIRUS A/INDIANA/08/2018 (H3N2) ANTIGEN (MDCK CELL DERIVED, PROPIOLACTONE INACTIVATED), INFLUENZA B VIRUS B/SINGAPORE/INFTT-16-0610/2016 ANTIGEN (MDCK CELL DERIVED, PROPIOLACTONE INACTIVATED), INFLUENZA B VIRUS B/IOWA/06/2017 ANTIGEN (MDCK CELL DERIVED, PROPIOLACTONE INACTIVATED) 15; 15; 15; 15 UG/.5ML; UG/.5ML; UG/.5ML; UG/.5ML
0.5 INJECTION, SUSPENSION INTRAMUSCULAR ONCE
Refills: 0 | Status: DISCONTINUED | OUTPATIENT
Start: 2025-02-24 | End: 2025-03-10

## 2025-02-24 RX ORDER — INSULIN GLARGINE-YFGN 100 [IU]/ML
16 INJECTION, SOLUTION SUBCUTANEOUS AT BEDTIME
Refills: 0 | Status: DISCONTINUED | OUTPATIENT
Start: 2025-02-24 | End: 2025-02-26

## 2025-02-24 RX ORDER — GABAPENTIN 400 MG/1
100 CAPSULE ORAL EVERY 12 HOURS
Refills: 0 | Status: DISCONTINUED | OUTPATIENT
Start: 2025-02-24 | End: 2025-02-26

## 2025-02-24 RX ORDER — ATORVASTATIN CALCIUM 80 MG/1
40 TABLET, FILM COATED ORAL AT BEDTIME
Refills: 0 | Status: DISCONTINUED | OUTPATIENT
Start: 2025-02-24 | End: 2025-02-26

## 2025-02-24 RX ORDER — DEXTROSE 50 % IN WATER 50 %
12.5 SYRINGE (ML) INTRAVENOUS ONCE
Refills: 0 | Status: DISCONTINUED | OUTPATIENT
Start: 2025-02-24 | End: 2025-02-26

## 2025-02-24 RX ORDER — ISOSORBIDE MONONITRATE 60 MG/1
30 TABLET, EXTENDED RELEASE ORAL ONCE
Refills: 0 | Status: COMPLETED | OUTPATIENT
Start: 2025-02-24 | End: 2025-02-24

## 2025-02-24 RX ORDER — SEVELAMER HYDROCHLORIDE 800 MG/1
800 TABLET ORAL
Refills: 0 | Status: DISCONTINUED | OUTPATIENT
Start: 2025-02-24 | End: 2025-02-26

## 2025-02-24 RX ORDER — EPOETIN ALFA 10000 [IU]/ML
8000 SOLUTION INTRAVENOUS; SUBCUTANEOUS ONCE
Refills: 0 | Status: COMPLETED | OUTPATIENT
Start: 2025-02-24 | End: 2025-02-24

## 2025-02-24 RX ORDER — ISOSORBIDE MONONITRATE 60 MG/1
30 TABLET, EXTENDED RELEASE ORAL DAILY
Refills: 0 | Status: DISCONTINUED | OUTPATIENT
Start: 2025-02-24 | End: 2025-02-24

## 2025-02-24 RX ORDER — ASPIRIN 325 MG
81 TABLET ORAL DAILY
Refills: 0 | Status: DISCONTINUED | OUTPATIENT
Start: 2025-02-24 | End: 2025-02-26

## 2025-02-24 RX ORDER — DEXTROSE 50 % IN WATER 50 %
15 SYRINGE (ML) INTRAVENOUS ONCE
Refills: 0 | Status: DISCONTINUED | OUTPATIENT
Start: 2025-02-24 | End: 2025-02-26

## 2025-02-24 RX ORDER — ISOSORBIDE MONONITRATE 60 MG/1
60 TABLET, EXTENDED RELEASE ORAL DAILY
Refills: 0 | Status: DISCONTINUED | OUTPATIENT
Start: 2025-02-25 | End: 2025-02-26

## 2025-02-24 RX ORDER — SENNA 187 MG
2 TABLET ORAL AT BEDTIME
Refills: 0 | Status: DISCONTINUED | OUTPATIENT
Start: 2025-02-24 | End: 2025-02-26

## 2025-02-24 RX ORDER — METOPROLOL SUCCINATE 50 MG/1
25 TABLET, EXTENDED RELEASE ORAL EVERY 12 HOURS
Refills: 0 | Status: DISCONTINUED | OUTPATIENT
Start: 2025-02-24 | End: 2025-02-25

## 2025-02-24 RX ORDER — INSULIN LISPRO 100 U/ML
INJECTION, SOLUTION INTRAVENOUS; SUBCUTANEOUS
Refills: 0 | Status: DISCONTINUED | OUTPATIENT
Start: 2025-02-24 | End: 2025-02-26

## 2025-02-24 RX ORDER — GLUCAGON 3 MG/1
1 POWDER NASAL ONCE
Refills: 0 | Status: DISCONTINUED | OUTPATIENT
Start: 2025-02-24 | End: 2025-02-26

## 2025-02-24 RX ORDER — NITROGLYCERIN 20 MG/G
0.4 OINTMENT TOPICAL
Refills: 0 | Status: DISCONTINUED | OUTPATIENT
Start: 2025-02-24 | End: 2025-02-27

## 2025-02-24 RX ORDER — AMLODIPINE BESYLATE 10 MG/1
5 TABLET ORAL DAILY
Refills: 0 | Status: DISCONTINUED | OUTPATIENT
Start: 2025-02-24 | End: 2025-02-26

## 2025-02-24 RX ORDER — INSULIN LISPRO 100 U/ML
5 INJECTION, SOLUTION INTRAVENOUS; SUBCUTANEOUS
Refills: 0 | Status: DISCONTINUED | OUTPATIENT
Start: 2025-02-24 | End: 2025-02-26

## 2025-02-24 RX ADMIN — METOPROLOL SUCCINATE 12.5 MILLIGRAM(S): 50 TABLET, EXTENDED RELEASE ORAL at 06:27

## 2025-02-24 RX ADMIN — Medication 667 MILLIGRAM(S): at 17:09

## 2025-02-24 RX ADMIN — LEVETIRACETAM 500 MILLIGRAM(S): 10 INJECTION, SOLUTION INTRAVENOUS at 17:09

## 2025-02-24 RX ADMIN — NITROGLYCERIN 0.4 MILLIGRAM(S): 20 OINTMENT TOPICAL at 13:49

## 2025-02-24 RX ADMIN — Medication 40 MILLIGRAM(S): at 06:27

## 2025-02-24 RX ADMIN — AMLODIPINE BESYLATE 5 MILLIGRAM(S): 10 TABLET ORAL at 06:27

## 2025-02-24 RX ADMIN — Medication 667 MILLIGRAM(S): at 12:13

## 2025-02-24 RX ADMIN — SEVELAMER HYDROCHLORIDE 800 MILLIGRAM(S): 800 TABLET ORAL at 17:09

## 2025-02-24 RX ADMIN — Medication 3 MILLILITER(S): at 15:00

## 2025-02-24 RX ADMIN — ISOSORBIDE MONONITRATE 30 MILLIGRAM(S): 60 TABLET, EXTENDED RELEASE ORAL at 22:29

## 2025-02-24 RX ADMIN — METOPROLOL SUCCINATE 12.5 MILLIGRAM(S): 50 TABLET, EXTENDED RELEASE ORAL at 17:09

## 2025-02-24 RX ADMIN — Medication 3 MILLILITER(S): at 22:30

## 2025-02-24 RX ADMIN — METOPROLOL SUCCINATE 25 MILLIGRAM(S): 50 TABLET, EXTENDED RELEASE ORAL at 22:30

## 2025-02-24 RX ADMIN — ATORVASTATIN CALCIUM 40 MILLIGRAM(S): 80 TABLET, FILM COATED ORAL at 22:29

## 2025-02-24 RX ADMIN — EPOETIN ALFA 8000 UNIT(S): 10000 SOLUTION INTRAVENOUS; SUBCUTANEOUS at 20:30

## 2025-02-24 RX ADMIN — ISOSORBIDE MONONITRATE 30 MILLIGRAM(S): 60 TABLET, EXTENDED RELEASE ORAL at 04:10

## 2025-02-24 RX ADMIN — POLYETHYLENE GLYCOL 3350 17 GRAM(S): 17 POWDER, FOR SOLUTION ORAL at 12:14

## 2025-02-24 RX ADMIN — INSULIN GLARGINE-YFGN 16 UNIT(S): 100 INJECTION, SOLUTION SUBCUTANEOUS at 22:41

## 2025-02-24 RX ADMIN — HEPARIN SODIUM 10.6 UNIT(S)/HR: 1000 INJECTION INTRAVENOUS; SUBCUTANEOUS at 06:45

## 2025-02-24 RX ADMIN — GABAPENTIN 100 MILLIGRAM(S): 400 CAPSULE ORAL at 04:10

## 2025-02-24 RX ADMIN — HEPARIN SODIUM 10.6 UNIT(S)/HR: 1000 INJECTION INTRAVENOUS; SUBCUTANEOUS at 06:50

## 2025-02-24 RX ADMIN — Medication 81 MILLIGRAM(S): at 12:12

## 2025-02-24 RX ADMIN — Medication 2 TABLET(S): at 22:29

## 2025-02-24 RX ADMIN — Medication 667 MILLIGRAM(S): at 07:57

## 2025-02-24 RX ADMIN — TAMSULOSIN HYDROCHLORIDE 0.4 MILLIGRAM(S): 0.4 CAPSULE ORAL at 22:29

## 2025-02-24 RX ADMIN — INSULIN LISPRO 2: 100 INJECTION, SOLUTION INTRAVENOUS; SUBCUTANEOUS at 22:40

## 2025-02-24 RX ADMIN — Medication 3 MILLILITER(S): at 06:42

## 2025-02-24 RX ADMIN — SEVELAMER HYDROCHLORIDE 800 MILLIGRAM(S): 800 TABLET ORAL at 07:58

## 2025-02-24 RX ADMIN — GABAPENTIN 100 MILLIGRAM(S): 400 CAPSULE ORAL at 17:09

## 2025-02-24 NOTE — PATIENT PROFILE ADULT - AGENT'S NAME
binu,  Stefany Ross binu, cecilia novak nelia novak nelia novak/ please call HCP for any issues not the one listed in patient info/it belongs to the wife who gets anxious when receiving call from hospital

## 2025-02-24 NOTE — PROGRESS NOTE ADULT - SUBJECTIVE AND OBJECTIVE BOX
CTICU  CRITICAL  CARE  attending     Hand off received 	    CHIEF COMPLAINT :    acute mi unstBLE angina  				   Pertinent clinical, laboratory, radiographic, hemodynamic, echocardiographic, respiratory data, microbiologic data and chart were reviewed and analyzed frequently throughout the course of the day and night  Patient seen and examined with CTS/ SH attending at bedside    INTERVAL HISTORY  admitted from outside hospital - called from [facility] man for acute chest pain    Pt is a 84y , Male, HEALTH ISSUES - PROBLEM Dx:      , FAMILY HISTORY:  Known health problems: none (Father, Mother)    PAST MEDICAL & SURGICAL HISTORY:  HTN (hypertension)      Gout      DM (diabetes mellitus)      History of BPH      HLD (hyperlipidemia)      CAD (coronary artery disease)  one stent 6 yrs      End stage renal disease      History of cholecystectomy      S/P arteriovenous (AV) fistula creation  1 yr ago        Patient is a 84y old  Male who presents with a chief complaint of NSTEMI     (28 Feb 2025 12:10)      14 system review was unremarkable    Vital signs, hemodynamic and respiratory parameters were reviewed from the bedside nursing flowsheet.  ICU Vital Signs Last 24 Hrs  T(C): 36.4 (28 Feb 2025 13:53), Max: 36.9 (28 Feb 2025 05:37)  T(F): 97.6 (28 Feb 2025 13:53), Max: 98.4 (28 Feb 2025 05:37)  HR: 70 (28 Feb 2025 13:00) (65 - 84)  BP: --  BP(mean): --  ABP: 148/41 (28 Feb 2025 13:00) (108/39 - 162/48)  ABP(mean): 65 (28 Feb 2025 13:00) (55 - 76)  RR: 19 (28 Feb 2025 13:00) (13 - 24)  SpO2: 96% (28 Feb 2025 13:00) (92% - 97%)    O2 Parameters below as of 28 Feb 2025 14:00  Patient On (Oxygen Delivery Method): nasal cannula, high flow  O2 Flow (L/min): 50  O2 Concentration (%): 60      Adult Advanced Hemodynamics Last 24 Hrs  CVP(mm Hg): 20 (28 Feb 2025 13:00) (13 - 63)  CVP(cm H2O): --  CO: --  CI: --  PA: --  PA(mean): --  PCWP: --  SVR: --  SVRI: --  PVR: --  PVRI: --, ABG - ( 28 Feb 2025 09:27 )  pH, Arterial: 7.37  pH, Blood: x     /  pCO2: 43    /  pO2: 91    / HCO3: 25    / Base Excess: -0.6  /  SaO2: 99.1                Intake and output was reviewed and the fluid balance was calculated  Daily     Daily   I&O's Summary    27 Feb 2025 07:01  -  28 Feb 2025 07:00  --------------------------------------------------------  IN: 621.1 mL / OUT: 2140 mL / NET: -1518.9 mL    28 Feb 2025 07:01  -  28 Feb 2025 13:59  --------------------------------------------------------  IN: 268.6 mL / OUT: 0 mL / NET: 268.6 mL        All lines and drain sites were assessed  Glycemic trend was reviewedCAPILLARY BLOOD GLUCOSE      POCT Blood Glucose.: 164 mg/dL (28 Feb 2025 11:35)    No acute change in mental status  Auscultation of the chest reveals equal bs  Abdomen is soft  Extremities are warm and well perfused  Wounds appear clean and unremarkable  Antibiotics are periop    labs  CBC Full  -  ( 28 Feb 2025 09:20 )  WBC Count : 9.42 K/uL  RBC Count : 2.66 M/uL  Hemoglobin : 8.2 g/dL  Hematocrit : 25.8 %  Platelet Count - Automated : 141 K/uL  Mean Cell Volume : 97.0 fl  Mean Cell Hemoglobin : 30.8 pg  Mean Cell Hemoglobin Concentration : 31.8 g/dL  Auto Neutrophil # : x  Auto Lymphocyte # : x  Auto Monocyte # : x  Auto Eosinophil # : x  Auto Basophil # : x  Auto Neutrophil % : x  Auto Lymphocyte % : x  Auto Monocyte % : x  Auto Eosinophil % : x  Auto Basophil % : x    02-28    133[L]  |  92[L]  |  25[H]  ----------------------------<  165[H]  4.6   |  25  |  5.14[H]    Ca    8.6      28 Feb 2025 09:20  Phos  4.7     02-28  Mg     2.4     02-28    TPro  6.1  /  Alb  3.2[L]  /  TBili  0.4  /  DBili  x   /  AST  32  /  ALT  <5[L]  /  AlkPhos  109  02-28    PT/INR - ( 28 Feb 2025 09:20 )   PT: 12.6 sec;   INR: 1.08          PTT - ( 28 Feb 2025 09:20 )  PTT:30.1 sec  The current medications were reviewed   MEDICATIONS  (STANDING):  acetaminophen     Tablet .. 650 milliGRAM(s) Oral every 6 hours  ascorbic acid 500 milliGRAM(s) Oral two times a day  aspirin enteric coated 81 milliGRAM(s) Oral daily  atorvastatin 40 milliGRAM(s) Oral at bedtime  bisacodyl Suppository 10 milliGRAM(s) Rectal once  calcium acetate 667 milliGRAM(s) Oral three times a day with meals  ceFAZolin   IVPB 1000 milliGRAM(s) IV Intermittent every 24 hours  chlorhexidine 0.12% Liquid 15 milliLiter(s) Oral Mucosa every 12 hours  chlorhexidine 2% Cloths 1 Application(s) Topical <User Schedule>  clopidogrel Tablet 75 milliGRAM(s) Oral daily  dextrose 5%. 1000 milliLiter(s) (50 mL/Hr) IV Continuous <Continuous>  dextrose 5%. 1000 milliLiter(s) (100 mL/Hr) IV Continuous <Continuous>  dextrose 50% Injectable 50 milliLiter(s) IV Push every 15 minutes  dextrose 50% Injectable 25 milliLiter(s) IV Push every 15 minutes  gabapentin 100 milliGRAM(s) Oral every 8 hours  glucagon  Injectable 1 milliGRAM(s) IntraMuscular once  heparin   Injectable 5000 Unit(s) SubCutaneous every 8 hours  influenza  Vaccine (HIGH DOSE) 0.5 milliLiter(s) IntraMuscular once  insulin glargine Injectable (LANTUS) 8 Unit(s) SubCutaneous at bedtime  insulin lispro (ADMELOG) corrective regimen sliding scale   SubCutaneous three times a day before meals  insulin lispro (ADMELOG) corrective regimen sliding scale   SubCutaneous at bedtime  insulin lispro Injectable (ADMELOG) 2 Unit(s) SubCutaneous three times a day before meals  levETIRAcetam 500 milliGRAM(s) Oral every 24 hours  midodrine 10 milliGRAM(s) Oral every 8 hours  mupirocin 2% Ointment 1 Application(s) Both Nostrils two times a day  pantoprazole    Tablet 40 milliGRAM(s) Oral daily  phenylephrine    Infusion 0.1 MICROgram(s)/kG/Min (3.09 mL/Hr) IV Continuous <Continuous>  polyethylene glycol 3350 17 Gram(s) Oral daily  senna 2 Tablet(s) Oral at bedtime  sodium chloride 0.9%. 1000 milliLiter(s) (10 mL/Hr) IV Continuous <Continuous>  tamsulosin 0.4 milliGRAM(s) Oral at bedtime    MEDICATIONS  (PRN):  dextrose Oral Gel 15 Gram(s) Oral once PRN Blood Glucose LESS THAN 70 milliGRAM(s)/deciliter       PROBLEM LIST/ ASSESSMENT:  HEALTH ISSUES - PROBLEM Dx:      ,   Patient is a 84y old  Male who presents with a chief complaint of NSTEMI     (28 Feb 2025 12:10)    preop for  cardiac surgery    Acute systolic and diastolic heart failure evidenced by SOB and parenchymal infiltrates; will treat with diuresis    Acute blood loss anemia with relative hypotension treated with > 1 unit PC    Acute post operative pulmonary insufficiency ruled in due to hypoxemia, O2 sats < 91% on RA treated with HFNC        ESRD        My plan includes :    transfer to 9 e     plan hd today ntg gtt prn left rad art line accordingly    cvl prn    transported to ct for aortic franchesca   close hemodynamic, ventilatory and drain monitoring and management per post op routine    Monitor for arrhythmias and monitor parameters for organ perfusion  beta blockade not administered due to hemodynamic instability and bradycardia  monitor neurologic status  Head of the bed should remain elevated to 45 deg .   chest PT and IS will be encouraged  monitor adequacy of oxygenation and ventilation and attempt to wean oxygen  antibiotic regimen will be tailored to the clinical, laboratory and microbiologic data  Nutritional goals will be met using po eventually , ensure adequate caloric intake and montior the same  Stress ulcer and VTE prophylaxis will be achieved    Glycemic control is satisfactory  Electrolytes have been repleted as necessary and wound care has been carried out. Pain control has been achieved.   agressive physical therapy and early mobility and ambulation goals will be met   The family was updated about the course and plan  CRITICAL CARE TIME Upon my evaluation, this patient had a high probability of imminent or life-threatening deterioration due to the above problems which required my direct attention, intervention, and personal management.  I have personally provided 130 minutes of critical care time exclusive of time spent on separately billable procedures. Time included review of laboratory data, radiology results, discussion with consultants, and monitoring for potential decompensation. Interventions were performed as documented abovepersonally provided by me  in evaluation and management, reassessments, review and interpretation of labs and x-rays, ventilator and hemodynamic management, formulating a plan and coordinating care. Time spent was non routine post-operarive caRE and included multiple and repeated evaluations at the bedside, includes my personal accompanying to ct scan   Time does not include procedural time.    CTICU ATTENDING     					    Israel Rasmussen MD

## 2025-02-24 NOTE — H&P ADULT - ASSESSMENT
This is a 83 yo M with ESRD on HD TTS, coronary artery disease s/p PCI, Type 2 diabetes, hypertension, hyperlipidemia, benign prostate hypertrophy, bladder diverticulum, and chronic hip pain who presented to [facility] complaining of right sided chest pain.  Patient was found to have NSTEMI.  He underwent a cardiac cath which showed 3v CAD.  He was started on heparin drip and transferred to [facility].  Patient reports last dialysis was saturday.  Patient denies chest pain, shortness of breath, nausea, vomiting on arrival.    ==== Neurovascular ====  -No delirium, pain well managed on current regimen  -C/w PRNs for Pain control  -Monitor neuro status  -Discharge summary from OSH reports he take keppra 500mg BID, patient denies history of seizures, does not know if he takes keppra, will verify med rec with outpatient pharmacy in AM.    ==== Respiratory ====  -Saturates well on RA     -AM CXR stable, repeat in AM  -Encourage IS 10x/hour while awake, Cough and deep breathing exercises  -Monitor respiratory status via SpO2    ==== Cardiovascular ====  Monitor on Tele  Continue aspirin 81mg QD  Continue Statin  3v CAD: Start Lopressor, imdur, continue norvasc, obtain CBC, CMP, Coags, Lipid Panel, TSH, Hemoglobin A1c, Pro-BNP, Cardiac Enzymes, Type & Screen x 2,  Room air ABG, UA, Carotid US, TTE, CXR Pa/Lat, EKG, Bedside PFTS, vein mapping, radial mapping     ==== GI ====   -Tolerating PO  -Prophylaxis: Protonix  -C/w bowel regimen    ==== /Renal ====  -BUN/Cr: pending  -ESRD on HD TTS  -Renal consult in AM  -Trend Cr on AM labs  -Replete electrolytes as needed    ==== ID ====   Afebrile, asymptomatic  -WBC: pending    ==== Endocrine ====   -A1C: pending, start ISS  -TSH: pending, per prior documentation on synthroid 50mcg, new documentation does not list taking, will need to verify med rec with pharmacy in AM due to patient not remembering.    ==== Hematologic ====   -H/H: Pending  -CBC, chem in AM  -DVT ppx: Heparin gtt and SCDs    ==== Disposition Planning ====  Telemetry

## 2025-02-24 NOTE — CONSULT NOTE ADULT - ASSESSMENT
84M with ESRD on HD TTS, last HD on , CAD, DM, HTN, HLD. Transferred from [facility] with NSTEMI. He underwent a cardiac cath which showed 3v CAD, pending CABG during this admission.  Stable at bedside, mildly hypertensive with SBP 150s, will schedule for HD today. Patient denies chest pain, shortness of breath, nausea, vomiting.    EDW ~88kg.   Usual HD prescription: 3hr/2L.   OP Nephrologist Dr. Johns.     Seen while having HD, tolerating HD session with the following parameters:     Schedule: Once, Modality: Hemodialysis, Access: Arteriovenous Fistula    Dialyzer: Optiflux [id number], Time: 180 Min    Blood Flow: 400 mL/Min , Dialysate Flow: 500 mL/Min, Dialysate Temp: 36.5, Tubinmm (Adult)    Target Fluid Removal: 2 Liters    Dialysate Electrolytes (mEq/L): Potassium 2, Calcium 2.5, Sodium 138, Bicarbonate 35 (25 @ 17:29) [Active]    -ESRD on HD TTS/MWF:   HD today as above, next HD  .  EDW TBD  Daily standing weights.   Renal diet  Restart P binders if part of home meds.   Strict I&O   84M with ESRD on HD TTS, last HD on , CAD, DM, HTN, HLD. Transferred from [facility] with NSTEMI. He underwent a cardiac cath which showed 3v CAD, pending CABG during this admission.  Stable at bedside, mildly hypertensive with SBP 150s, will schedule for HD today. Patient denies chest pain, shortness of breath, nausea, vomiting.    EDW ~88kg.   Usual HD prescription: 3hr/2L.   OP Nephrologist Dr. Johns.     Seen while having HD, tolerating HD session with the following parameters:     Schedule: Once, Modality: Hemodialysis, Access: Arteriovenous Fistula    Dialyzer: Optiflux [id number], Time: 180 Min    Blood Flow: 400 mL/Min , Dialysate Flow: 500 mL/Min, Dialysate Temp: 36.5, Tubinmm (Adult)    Target Fluid Removal: 2 Liters    Dialysate Electrolytes (mEq/L): Potassium 2, Calcium 2.5, Sodium 138, Bicarbonate 35 (25 @ 17:29) [Active]    -ESRD on HD TTS/MWF:   HD today as above, next HD  vs . Pending CABG during this admission( vs ).   EDW TBD  Daily standing weights.   Renal diet  Restart P binders if part of home meds.   Strict I&O    -Access:   AVF with palpalble thrill, without signs of infection/bleeding.    -HTN:  UF with HD.   Restart home antihypertensives as tolerated.   Hold antihypertensive in am of HD days, restart s/p HD as need it.     -Normocytic Anemia:  Hgb not at renal goal  Obtain Fe panel and Ferritin levels  RODRIGO with HD

## 2025-02-24 NOTE — CONSULT NOTE ADULT - ATTENDING COMMENTS
events noted, chart reviewed  83yo M with ESRD, transferred from [facility] with NSTEMI; cardiac cath revealed 3V disease. for CABG tomorrow 2/25 or on 2/26  h/o CAD, DM, HTN, HLD  BP 150s  NAD  to arrange for HD tody in prep for OR tomorrow  reviewed with CTS team

## 2025-02-24 NOTE — CONSULT NOTE ADULT - SUBJECTIVE AND OBJECTIVE BOX
NEPHROLOGY CONSULTATION NOTE    84M with ESRD on HD TTS, last HD on 2/22, CAD, DM, HTN, HLD. Transferred from [facility] with NSTEMI. He underwent a cardiac cath which showed 3v CAD, pending CABG during this admission.  Stable at bedside, mildly hypertensive with SBP 150s, will schedule for HD today. Patient denies chest pain, shortness of breath, nausea, vomiting.    PAST MEDICAL & SURGICAL HISTORY:  HTN (hypertension)  Gout  DM (diabetes mellitus)  History of BPH  HLD (hyperlipidemia)  CAD (coronary artery disease)  one stent 6 yrs  End stage renal disease  History of cholecystectomy  S/P arteriovenous (AV) fistula creation  1 yr ago    Allergies:  No Known Allergies    Home Medications Reviewed  Hospital Medications:   MEDICATIONS  (STANDING):  amLODIPine   Tablet 5 milliGRAM(s) Oral daily  aspirin enteric coated 81 milliGRAM(s) Oral daily  atorvastatin 40 milliGRAM(s) Oral at bedtime  calcium acetate 667 milliGRAM(s) Oral three times a day with meals  epoetin shanta-epbx (RETACRIT) Injectable 8000 Unit(s) IV Push once  gabapentin 100 milliGRAM(s) Oral every 12 hours  heparin  Infusion 1060 Unit(s)/Hr (10.6 mL/Hr) IV Continuous <Continuous>  influenza  Vaccine (HIGH DOSE) 0.5 milliLiter(s) IntraMuscular once  isosorbide   mononitrate ER Tablet (IMDUR) 30 milliGRAM(s) Oral daily  levETIRAcetam 500 milliGRAM(s) Oral two times a day  metoprolol tartrate 12.5 milliGRAM(s) Oral every 12 hours  nitroglycerin     SubLingual 0.4 milliGRAM(s) SubLingual every 5 minutes  pantoprazole    Tablet 40 milliGRAM(s) Oral before breakfast  polyethylene glycol 3350 17 Gram(s) Oral daily  senna 2 Tablet(s) Oral at bedtime  sevelamer carbonate 800 milliGRAM(s) Oral two times a day with meals  sodium chloride 0.9% lock flush 3 milliLiter(s) IV Push every 8 hours  tamsulosin 0.4 milliGRAM(s) Oral at bedtime    SOCIAL HISTORY:  Denies ETOH,Smoking,   FAMILY HISTORY:  Known health problems: none (Father, Mother)      REVIEW OF SYSTEMS:  All other review of systems is negative unless indicated above.    VITALS:  Vital Signs Last 24 Hrs  T(C): 36.4 (24 Feb 2025 17:16), Max: 36.7 (24 Feb 2025 06:00)  T(F): 97.5 (24 Feb 2025 17:16), Max: 98 (24 Feb 2025 06:00)  HR: 77 (24 Feb 2025 17:00) (72 - 83)  BP: 159/70 (24 Feb 2025 17:00) (140/64 - 176/75)  BP(mean): 100 (24 Feb 2025 17:00) (92 - 100)  RR: 20 (24 Feb 2025 17:00) (14 - 20)  SpO2: 97% (24 Feb 2025 17:00) (97% - 98%)    Parameters below as of 24 Feb 2025 18:00  Patient On (Oxygen Delivery Method): nasal cannula w/ humidification  O2 Flow (L/min): 2    02-23 @ 07:01  -  02-24 @ 07:00  --------------------------------------------------------  IN: 53 mL / OUT: 100 mL / NET: -47 mL    02-24 @ 07:01  -  02-24 @ 17:43  --------------------------------------------------------  IN: 204.6 mL / OUT: 0 mL / NET: 204.6 mL    Height (cm): 162.6 (02-24 @ 02:00)  Weight (kg): 82.4 (02-24 @ 02:00)  BMI (kg/m2): 31.2 (02-24 @ 02:00)  BSA (m2): 1.88 (02-24 @ 02:00)    PHYSICAL EXAM:  Constitutional: NAD  Neck: No JVD  Respiratory: CTAB, no wheezes, rales or rhonchi  Cardiovascular: S1, S2, RRR  Gastrointestinal: ND/NT, +BS  Extremities:  No peripheral edema in LEs.   Neurological: A/O x 3, no focal motor deficits.   : No majano.   Vascular Access: RUE AVF with palpable thrill.     LABS:  02-24    136  |  91[L]  |  45[H]  ----------------------------<  214[H]  5.1   |  28  |  10.34[H]    Ca    9.0      24 Feb 2025 15:37  Phos  4.7     02-24  Mg     2.4     02-24    TPro  6.6  /  Alb  3.9  /  TBili  0.5  /  DBili      /  AST  15  /  ALT  19  /  AlkPhos  174[H]  02-24    Creatinine Trend: 10.34 <--, 9.95 <--, 9.96 <--, 9.59 <--                        9.0    7.76  )-----------( 167      ( 24 Feb 2025 15:37 )             27.9     Urine Studies:  Urinalysis Basic - ( 24 Feb 2025 15:37 )    Color:  / Appearance:  / SG:  / pH:   Gluc: 214 mg/dL / Ketone:   / Bili:  / Urobili:    Blood:  / Protein:  / Nitrite:    Leuk Esterase:  / RBC:  / WBC    Sq Epi:  / Non Sq Epi:  / Bacteria:                 RADIOLOGY & ADDITIONAL STUDIES: Reviewed.

## 2025-02-24 NOTE — H&P ADULT - NSHPPHYSICALEXAM_GEN_ALL_CORE
Vital Signs Last 24 Hrs  T(C): --  T(F): --  HR: 83 (24 Feb 2025 02:00) (83 - 83)  BP: 176/75 (24 Feb 2025 02:00) (176/75 - 176/75)  BP(mean): --  RR: 14 (24 Feb 2025 02:00) (14 - 14)  SpO2: 98% (24 Feb 2025 02:00) (98% - 98%)    Parameters below as of 24 Feb 2025 02:00  Patient On (Oxygen Delivery Method): nasal cannula  O2 Flow (L/min): 4  O2 Concentration (%): 98    GEN: NAD, looks comfortable  Psych: Mood appropriate  Neuro: A&Ox3.  No focal deficits.  Moving all extremities.   HEENT: No obvious abnormalities  CV: S1S2, regular, no murmurs appreciated.  No carotid bruits.  No JVD  Lungs: Clear B/L.  No wheezing, rales or rhonchi  ABD: Soft, non-tender, non-distended.  +Bowel sounds  EXT: Warm and well perfused.  No peripheral edema noted, RUE AVF with palpable thrill  Musculoskeletal: Moving all extremities with normal ROM, no joint swelling  PV: Pedal pulses palpable

## 2025-02-24 NOTE — H&P ADULT - NSHPREVIEWOFSYSTEMS_GEN_ALL_CORE
Review of Systems  CONSTITUTIONAL:  Denies Fevers / chills, sweats, fatigue, weight loss, weight gain                                      NEURO:  Denies parethesias, seizures, syncope, confusion                                                                                EYES:  Denies Blurry vision, discharge, pain, loss of vision                                                                                    ENMT:  Denies Difficulty hearing, vertigo, dysphagia, epistaxis, recent dental work                                       CV:  Denies Chest pain, palpitations, NOLASCO, orthopnea                                                                                          RESPIRATORY:  Denies Wheezing, SOB, cough / sputum, hemoptysis                                                                GI:  Denies Nausea, vomiting, diarrhea, constipation, melena, difficulty swallowing                                               : Denies Hematuria, dysuria, urgency, incontinence                                                                                         MUSCULOSKELETAL:  Denies arthritis, joint swelling, muscle weakness                                                             SKIN/BREAST:  Denies rash, itching, hair loss, masses                                                                                            PSYCH:  Denies depression, anxiety, suicidal ideation                                                                                               HEME/LYMPH:  Denies bruises easily, enlarged lymph nodes, tender lymph nodes                                        ENDOCRINE:  Denies cold intolerance, heat intolerance, polydipsia

## 2025-02-24 NOTE — PATIENT PROFILE ADULT - NSPROIMPLANTSMEDDEV_GEN_A_NUR
Left message for patient regarding fasting lab work needed prior to upcoming appointment.  Provided clinic phone number to contact for any questions or concerns.    Pacemaker

## 2025-02-24 NOTE — H&P ADULT - HISTORY OF PRESENT ILLNESS
This is a 83 yo M with ESRD on HD TTS, coronary artery disease s/p PCI, Type 2 diabetes, hypertension, hyperlipidemia, benign prostate hypertrophy, bladder diverticulum, and chronic hip pain who presented to [facility] complaining of right sided chest pain.  Patient was found to have NSTEMI.  He underwent a cardiac cath which showed 3v CAD.  He was started on heparin drip and transferred to [facility].  Patient reports last dialysis was saturday.  Patient denies chest pain, shortness of breath, nausea, vomiting on arrival.

## 2025-02-24 NOTE — PATIENT PROFILE ADULT - FALL HARM RISK - HARM RISK INTERVENTIONS

## 2025-02-25 LAB
ADD ON TEST-SPECIMEN IN LAB: SIGNIFICANT CHANGE UP
ALBUMIN SERPL ELPH-MCNC: 3.5 G/DL — SIGNIFICANT CHANGE UP (ref 3.3–5)
ALBUMIN SERPL ELPH-MCNC: 3.6 G/DL — SIGNIFICANT CHANGE UP (ref 3.3–5)
ALP SERPL-CCNC: 155 U/L — HIGH (ref 40–120)
ALP SERPL-CCNC: 157 U/L — HIGH (ref 40–120)
ALT FLD-CCNC: 16 U/L — SIGNIFICANT CHANGE UP (ref 10–45)
ALT FLD-CCNC: 16 U/L — SIGNIFICANT CHANGE UP (ref 10–45)
ANION GAP SERPL CALC-SCNC: 14 MMOL/L — SIGNIFICANT CHANGE UP (ref 5–17)
ANION GAP SERPL CALC-SCNC: 15 MMOL/L — SIGNIFICANT CHANGE UP (ref 5–17)
APTT BLD: 64.3 SEC — HIGH (ref 24.5–35.6)
APTT BLD: 67.4 SEC — HIGH (ref 24.5–35.6)
AST SERPL-CCNC: 13 U/L — SIGNIFICANT CHANGE UP (ref 10–40)
AST SERPL-CCNC: 16 U/L — SIGNIFICANT CHANGE UP (ref 10–40)
BILIRUB SERPL-MCNC: 0.5 MG/DL — SIGNIFICANT CHANGE UP (ref 0.2–1.2)
BILIRUB SERPL-MCNC: 0.7 MG/DL — SIGNIFICANT CHANGE UP (ref 0.2–1.2)
BUN SERPL-MCNC: 22 MG/DL — SIGNIFICANT CHANGE UP (ref 7–23)
BUN SERPL-MCNC: 25 MG/DL — HIGH (ref 7–23)
CALCIUM SERPL-MCNC: 8.8 MG/DL — SIGNIFICANT CHANGE UP (ref 8.4–10.5)
CALCIUM SERPL-MCNC: 9 MG/DL — SIGNIFICANT CHANGE UP (ref 8.4–10.5)
CHLORIDE SERPL-SCNC: 94 MMOL/L — LOW (ref 96–108)
CHLORIDE SERPL-SCNC: 95 MMOL/L — LOW (ref 96–108)
CO2 SERPL-SCNC: 29 MMOL/L — SIGNIFICANT CHANGE UP (ref 22–31)
CO2 SERPL-SCNC: 29 MMOL/L — SIGNIFICANT CHANGE UP (ref 22–31)
CREAT SERPL-MCNC: 6.13 MG/DL — HIGH (ref 0.5–1.3)
CREAT SERPL-MCNC: 6.88 MG/DL — HIGH (ref 0.5–1.3)
EGFR: 7 ML/MIN/1.73M2 — LOW
EGFR: 7 ML/MIN/1.73M2 — LOW
EGFR: 9 ML/MIN/1.73M2 — LOW
EGFR: 9 ML/MIN/1.73M2 — LOW
GLUCOSE SERPL-MCNC: 134 MG/DL — HIGH (ref 70–99)
GLUCOSE SERPL-MCNC: 135 MG/DL — HIGH (ref 70–99)
HCT VFR BLD CALC: 29.8 % — LOW (ref 39–50)
HCT VFR BLD CALC: 30.9 % — LOW (ref 39–50)
HGB BLD-MCNC: 9.7 G/DL — LOW (ref 13–17)
HGB BLD-MCNC: 9.9 G/DL — LOW (ref 13–17)
INR BLD: 1 — SIGNIFICANT CHANGE UP (ref 0.85–1.16)
INR BLD: 1.03 — SIGNIFICANT CHANGE UP (ref 0.85–1.16)
MAGNESIUM SERPL-MCNC: 2.2 MG/DL — SIGNIFICANT CHANGE UP (ref 1.6–2.6)
MAGNESIUM SERPL-MCNC: 2.3 MG/DL — SIGNIFICANT CHANGE UP (ref 1.6–2.6)
MCHC RBC-ENTMCNC: 30 PG — SIGNIFICANT CHANGE UP (ref 27–34)
MCHC RBC-ENTMCNC: 30.7 PG — SIGNIFICANT CHANGE UP (ref 27–34)
MCHC RBC-ENTMCNC: 32 G/DL — SIGNIFICANT CHANGE UP (ref 32–36)
MCHC RBC-ENTMCNC: 32.6 G/DL — SIGNIFICANT CHANGE UP (ref 32–36)
MCV RBC AUTO: 93.6 FL — SIGNIFICANT CHANGE UP (ref 80–100)
MCV RBC AUTO: 94.3 FL — SIGNIFICANT CHANGE UP (ref 80–100)
NRBC BLD AUTO-RTO: 0 /100 WBCS — SIGNIFICANT CHANGE UP (ref 0–0)
NRBC BLD AUTO-RTO: 0 /100 WBCS — SIGNIFICANT CHANGE UP (ref 0–0)
PA ADP PRP-ACNC: 197 PRU — SIGNIFICANT CHANGE UP (ref 182–335)
PHOSPHATE SERPL-MCNC: 3.1 MG/DL — SIGNIFICANT CHANGE UP (ref 2.5–4.5)
PHOSPHATE SERPL-MCNC: 3.4 MG/DL — SIGNIFICANT CHANGE UP (ref 2.5–4.5)
PLATELET # BLD AUTO: 151 K/UL — SIGNIFICANT CHANGE UP (ref 150–400)
PLATELET # BLD AUTO: 152 K/UL — SIGNIFICANT CHANGE UP (ref 150–400)
POTASSIUM SERPL-MCNC: 4.4 MMOL/L — SIGNIFICANT CHANGE UP (ref 3.5–5.3)
POTASSIUM SERPL-MCNC: 4.4 MMOL/L — SIGNIFICANT CHANGE UP (ref 3.5–5.3)
POTASSIUM SERPL-SCNC: 4.4 MMOL/L — SIGNIFICANT CHANGE UP (ref 3.5–5.3)
POTASSIUM SERPL-SCNC: 4.4 MMOL/L — SIGNIFICANT CHANGE UP (ref 3.5–5.3)
PROT SERPL-MCNC: 6.6 G/DL — SIGNIFICANT CHANGE UP (ref 6–8.3)
PROT SERPL-MCNC: 7 G/DL — SIGNIFICANT CHANGE UP (ref 6–8.3)
PROTHROM AB SERPL-ACNC: 11.7 SEC — SIGNIFICANT CHANGE UP (ref 9.9–13.4)
PROTHROM AB SERPL-ACNC: 11.8 SEC — SIGNIFICANT CHANGE UP (ref 9.9–13.4)
RBC # BLD: 3.16 M/UL — LOW (ref 4.2–5.8)
RBC # BLD: 3.3 M/UL — LOW (ref 4.2–5.8)
RBC # FLD: 13.8 % — SIGNIFICANT CHANGE UP (ref 10.3–14.5)
RBC # FLD: 14.1 % — SIGNIFICANT CHANGE UP (ref 10.3–14.5)
SODIUM SERPL-SCNC: 138 MMOL/L — SIGNIFICANT CHANGE UP (ref 135–145)
SODIUM SERPL-SCNC: 138 MMOL/L — SIGNIFICANT CHANGE UP (ref 135–145)
WBC # BLD: 6.65 K/UL — SIGNIFICANT CHANGE UP (ref 3.8–10.5)
WBC # BLD: 7.94 K/UL — SIGNIFICANT CHANGE UP (ref 3.8–10.5)
WBC # FLD AUTO: 6.65 K/UL — SIGNIFICANT CHANGE UP (ref 3.8–10.5)
WBC # FLD AUTO: 7.94 K/UL — SIGNIFICANT CHANGE UP (ref 3.8–10.5)

## 2025-02-25 PROCEDURE — 99291 CRITICAL CARE FIRST HOUR: CPT

## 2025-02-25 PROCEDURE — 71045 X-RAY EXAM CHEST 1 VIEW: CPT | Mod: 26

## 2025-02-25 PROCEDURE — 70450 CT HEAD/BRAIN W/O DYE: CPT | Mod: 26

## 2025-02-25 RX ORDER — ACETAMINOPHEN 500 MG/5ML
1000 LIQUID (ML) ORAL ONCE
Refills: 0 | Status: COMPLETED | OUTPATIENT
Start: 2025-02-25 | End: 2025-02-26

## 2025-02-25 RX ORDER — MUPIROCIN CALCIUM 20 MG/G
1 CREAM TOPICAL
Refills: 0 | Status: DISCONTINUED | OUTPATIENT
Start: 2025-02-25 | End: 2025-02-26

## 2025-02-25 RX ORDER — METOPROLOL SUCCINATE 50 MG/1
25 TABLET, EXTENDED RELEASE ORAL EVERY 6 HOURS
Refills: 0 | Status: DISCONTINUED | OUTPATIENT
Start: 2025-02-25 | End: 2025-02-26

## 2025-02-25 RX ADMIN — INSULIN LISPRO 2: 100 INJECTION, SOLUTION INTRAVENOUS; SUBCUTANEOUS at 21:42

## 2025-02-25 RX ADMIN — Medication 3 MILLILITER(S): at 12:56

## 2025-02-25 RX ADMIN — METOPROLOL SUCCINATE 25 MILLIGRAM(S): 50 TABLET, EXTENDED RELEASE ORAL at 11:23

## 2025-02-25 RX ADMIN — Medication 667 MILLIGRAM(S): at 08:36

## 2025-02-25 RX ADMIN — INSULIN LISPRO 5 UNIT(S): 100 INJECTION, SOLUTION INTRAVENOUS; SUBCUTANEOUS at 08:06

## 2025-02-25 RX ADMIN — ISOSORBIDE MONONITRATE 60 MILLIGRAM(S): 60 TABLET, EXTENDED RELEASE ORAL at 11:23

## 2025-02-25 RX ADMIN — Medication 3 MILLILITER(S): at 21:09

## 2025-02-25 RX ADMIN — Medication 1 APPLICATION(S): at 21:36

## 2025-02-25 RX ADMIN — SEVELAMER HYDROCHLORIDE 800 MILLIGRAM(S): 800 TABLET ORAL at 08:35

## 2025-02-25 RX ADMIN — LEVETIRACETAM 500 MILLIGRAM(S): 10 INJECTION, SOLUTION INTRAVENOUS at 06:35

## 2025-02-25 RX ADMIN — INSULIN GLARGINE-YFGN 16 UNIT(S): 100 INJECTION, SOLUTION SUBCUTANEOUS at 21:40

## 2025-02-25 RX ADMIN — Medication 1 APPLICATION(S): at 11:35

## 2025-02-25 RX ADMIN — SEVELAMER HYDROCHLORIDE 800 MILLIGRAM(S): 800 TABLET ORAL at 17:35

## 2025-02-25 RX ADMIN — TAMSULOSIN HYDROCHLORIDE 0.4 MILLIGRAM(S): 0.4 CAPSULE ORAL at 21:35

## 2025-02-25 RX ADMIN — Medication 40 MILLIGRAM(S): at 06:35

## 2025-02-25 RX ADMIN — Medication 2000 MILLIGRAM(S): at 21:35

## 2025-02-25 RX ADMIN — METOPROLOL SUCCINATE 25 MILLIGRAM(S): 50 TABLET, EXTENDED RELEASE ORAL at 01:36

## 2025-02-25 RX ADMIN — Medication 81 MILLIGRAM(S): at 11:24

## 2025-02-25 RX ADMIN — METOPROLOL SUCCINATE 25 MILLIGRAM(S): 50 TABLET, EXTENDED RELEASE ORAL at 17:35

## 2025-02-25 RX ADMIN — INSULIN LISPRO 5 UNIT(S): 100 INJECTION, SOLUTION INTRAVENOUS; SUBCUTANEOUS at 16:13

## 2025-02-25 RX ADMIN — Medication 1 APPLICATION(S): at 21:35

## 2025-02-25 RX ADMIN — METOPROLOL SUCCINATE 25 MILLIGRAM(S): 50 TABLET, EXTENDED RELEASE ORAL at 06:36

## 2025-02-25 RX ADMIN — MUPIROCIN CALCIUM 1 APPLICATION(S): 20 CREAM TOPICAL at 17:35

## 2025-02-25 RX ADMIN — INSULIN LISPRO 5 UNIT(S): 100 INJECTION, SOLUTION INTRAVENOUS; SUBCUTANEOUS at 11:24

## 2025-02-25 RX ADMIN — LEVETIRACETAM 500 MILLIGRAM(S): 10 INJECTION, SOLUTION INTRAVENOUS at 17:35

## 2025-02-25 RX ADMIN — AMLODIPINE BESYLATE 5 MILLIGRAM(S): 10 TABLET ORAL at 06:35

## 2025-02-25 RX ADMIN — ATORVASTATIN CALCIUM 40 MILLIGRAM(S): 80 TABLET, FILM COATED ORAL at 21:35

## 2025-02-25 RX ADMIN — POLYETHYLENE GLYCOL 3350 17 GRAM(S): 17 POWDER, FOR SOLUTION ORAL at 11:24

## 2025-02-25 RX ADMIN — GABAPENTIN 100 MILLIGRAM(S): 400 CAPSULE ORAL at 06:35

## 2025-02-25 RX ADMIN — GABAPENTIN 100 MILLIGRAM(S): 400 CAPSULE ORAL at 17:35

## 2025-02-25 RX ADMIN — Medication 667 MILLIGRAM(S): at 17:35

## 2025-02-25 RX ADMIN — Medication 667 MILLIGRAM(S): at 11:35

## 2025-02-25 RX ADMIN — Medication 3 MILLILITER(S): at 06:33

## 2025-02-25 NOTE — PRE-ANESTHESIA EVALUATION ADULT - NSANTHOBSERVEDRD_ENT_A_CORE
Goal Outcome Evaluation:                      Patient alert and oriented with elevated blood glucose. Patient medicated for pain and nausea. Patient up in chair all day. IV fluids continue to infuse. No acute distress noted at this time, will continue to monitor.   No

## 2025-02-25 NOTE — PRE-ANESTHESIA EVALUATION ADULT - BP NONINVASIVE SYSTOLIC (MM HG)
ON CALL-- spoke with pt. Bile acid on 10/3--16.2. AST/ALT today-- 135/170. Itching. Pt has appt with MFM on 10/9. Spoke with M Dr Jenny Lieberman. Keep Monday appt. Most likely IOL at 38 wks. Will start ursodiol 300 mg TID. Will convert lovenox to heparin 10,000 units bid subcutaneous. Repeat LFT and PTT on Monday.    Spoke with pharmacy for heparin Rx 154

## 2025-02-25 NOTE — PROGRESS NOTE ADULT - SUBJECTIVE AND OBJECTIVE BOX
CTICU  CRITICAL  CARE  attending     Hand off received 					   Pertinent clinical, laboratory, radiographic, hemodynamic, echocardiographic, respiratory data, microbiologic data and chart were reviewed and analyzed frequently throughout the course of the day and night  Patient seen and examined with CTS/ SH attending at bedside    Pt is a 84y , Male, HEALTH ISSUES - PROBLEM Dx:      , FAMILY HISTORY:  Known health problems: none (Father, Mother)    PAST MEDICAL & SURGICAL HISTORY:  HTN (hypertension)      Gout      DM (diabetes mellitus)      History of BPH      HLD (hyperlipidemia)      CAD (coronary artery disease)  one stent 6 yrs      End stage renal disease      History of cholecystectomy      S/P arteriovenous (AV) fistula creation  1 yr ago        Patient is a 84y old  Male who presents with a chief complaint of Coronary Artery Disease (2025 17:40)      14 system review was unremarkable  acute changes include acute respiratory failure  Vital signs, hemodynamic and respiratory parameters were reviewed from the bedside nursing flowsheet.  ICU Vital Signs Last 24 Hrs  T(C): 36.5 (2025 17:26), Max: 36.9 (2025 01:01)  T(F): 97.7 (2025 17:26), Max: 98.4 (2025 01:01)  HR: 68 (2025 18:00) (66 - 77)  BP: 145/65 (2025 18:00) (121/59 - 156/74)  BP(mean): 93 (2025 18:00) (83 - 106)  ABP: --  ABP(mean): --  RR: 18 (2025 18:00) (16 - 20)  SpO2: 98% (2025 18:00) (93% - 100%)    O2 Parameters below as of 2025 19:00  Patient On (Oxygen Delivery Method): nasal cannula w/ humidification  O2 Flow (L/min): 2        Adult Advanced Hemodynamics Last 24 Hrs  CVP(mm Hg): --  CVP(cm H2O): --  CO: --  CI: --  PA: --  PA(mean): --  PCWP: --  SVR: --  SVRI: --  PVR: --  PVRI: --,     Intake and output was reviewed and the fluid balance was calculated  Daily     Daily Weight in k.1 (2025 21:20)  I&O's Summary    2025 07:01  -  2025 07:00  --------------------------------------------------------  IN: 711.6 mL / OUT: 1000 mL / NET: -288.4 mL    2025 07:01  -  2025 18:42  --------------------------------------------------------  IN: 865.5 mL / OUT: 0 mL / NET: 865.5 mL        All lines and drain sites were assessed  [facility]      POCT Blood Glucose.: 132 mg/dL (2025 16:00)    No acute change in mental status  Auscultation of the chest reveals equal bs  Abdomen is soft  Extremities are warm and well perfused  Wounds appear clean and unremarkable  Antibiotics are periop    labs  CBC Full  -  ( 2025 10:38 )  WBC Count : 7.94 K/uL  RBC Count : 3.30 M/uL  Hemoglobin : 9.9 g/dL  Hematocrit : 30.9 %  Platelet Count - Automated : 152 K/uL  Mean Cell Volume : 93.6 fl  Mean Cell Hemoglobin : 30.0 pg  Mean Cell Hemoglobin Concentration : 32.0 g/dL  Auto Neutrophil # : x  Auto Lymphocyte # : x  Auto Monocyte # : x  Auto Eosinophil # : x  Auto Basophil # : x  Auto Neutrophil % : x  Auto Lymphocyte % : x  Auto Monocyte % : x  Auto Eosinophil % : x  Auto Basophil % : x        138  |  94[L]  |  25[H]  ----------------------------<  134[H]  4.4   |  29  |  6.88[H]    Ca    9.0      2025 10:38  Phos  3.4     02-  Mg     2.3         TPro  7.0  /  Alb  3.6  /  TBili  0.5  /  DBili  x   /  AST  16  /  ALT  16  /  AlkPhos  157[H]  02-25    PT/INR - ( 2025 10:38 )   PT: 11.8 sec;   INR: 1.03          PTT - ( 2025 10:38 )  PTT:67.4 sec  The current medications were reviewed   MEDICATIONS  (STANDING):  acetaminophen     Tablet .. 1000 milliGRAM(s) Oral once  amLODIPine   Tablet 5 milliGRAM(s) Oral daily  ascorbic acid 2000 milliGRAM(s) Oral once  aspirin enteric coated 81 milliGRAM(s) Oral daily  atorvastatin 40 milliGRAM(s) Oral at bedtime  calcium acetate 667 milliGRAM(s) Oral three times a day with meals  chlorhexidine 0.12% Liquid 5 milliLiter(s) Swish and Spit once  chlorhexidine 2% Cloths 1 Application(s) Topical daily  chlorhexidine 4% Liquid 1 Application(s) Topical once  chlorhexidine 4% Liquid 1 Application(s) Topical once  dextrose 5%. 1000 milliLiter(s) (50 mL/Hr) IV Continuous <Continuous>  dextrose 5%. 1000 milliLiter(s) (100 mL/Hr) IV Continuous <Continuous>  dextrose 50% Injectable 25 Gram(s) IV Push once  dextrose 50% Injectable 12.5 Gram(s) IV Push once  dextrose 50% Injectable 25 Gram(s) IV Push once  gabapentin 100 milliGRAM(s) Oral every 12 hours  glucagon  Injectable 1 milliGRAM(s) IntraMuscular once  heparin  Infusion 1060 Unit(s)/Hr (10.6 mL/Hr) IV Continuous <Continuous>  influenza  Vaccine (HIGH DOSE) 0.5 milliLiter(s) IntraMuscular once  insulin glargine Injectable (LANTUS) 16 Unit(s) SubCutaneous at bedtime  insulin lispro (ADMELOG) corrective regimen sliding scale   SubCutaneous Before meals and at bedtime  insulin lispro Injectable (ADMELOG) 5 Unit(s) SubCutaneous three times a day before meals  isosorbide   mononitrate ER Tablet (IMDUR) 60 milliGRAM(s) Oral daily  levETIRAcetam 500 milliGRAM(s) Oral two times a day  metoprolol tartrate 25 milliGRAM(s) Oral every 6 hours  mupirocin 2% Ointment 1 Application(s) Both Nostrils two times a day  nitroglycerin     SubLingual 0.4 milliGRAM(s) SubLingual every 5 minutes  pantoprazole    Tablet 40 milliGRAM(s) Oral before breakfast  polyethylene glycol 3350 17 Gram(s) Oral daily  senna 2 Tablet(s) Oral at bedtime  sevelamer carbonate 800 milliGRAM(s) Oral two times a day with meals  sodium chloride 0.9% lock flush 3 milliLiter(s) IV Push every 8 hours  tamsulosin 0.4 milliGRAM(s) Oral at bedtime    MEDICATIONS  (PRN):  dextrose Oral Gel 15 Gram(s) Oral once PRN Blood Glucose LESS THAN 70 milliGRAM(s)/deciliter       PROBLEM LIST/ ASSESSMENT:  HEALTH ISSUES - PROBLEM Dx:      ,   Patient is a 84y old  Male who presents with a chief complaint of Coronary Artery Disease (2025 17:40)     s/p   acute changes include acute respiratory failure    My plan includes :  close hemodynamic, ventilatory and drain monitoring and management per post op routine    Monitor for arrhythmias and monitor parameters for organ perfusion  monitor neurologic status  Head of the bed should remain elevated to 45 deg .   chest PT and IS will be encouraged  monitor adequacy of oxygenation and ventilation and attempt to wean oxygen  Nutritional goals will be met using po eventually , ensure adequate caloric intake and montior the same  Stress ulcer and VTE prophylaxis will be achieved    Glycemic control is satisfactory  Electrolytes have been repleted as necessary and wound care has been carried out. Pain control has been achieved.   agressive physical therapy and early mobility and ambulation goals will be met   The family was updated about the course and plan  CRITICAL CARE TIME SPENT in evaluation and management, reassessments, review and interpretation of labs and x-rays, ventilator and hemodynamic management, formulating a plan and coordinating care: ___107___ MIN.  Time does not include procedural time.  CTICU ATTENDING     					    Shay Rojas MD                        	 CTICU  CRITICAL  CARE  attending     Hand off received 					   Pertinent clinical, laboratory, radiographic, hemodynamic, echocardiographic, respiratory data, microbiologic data and chart were reviewed and analyzed frequently throughout the course of the day and night  Patient seen and examined with CTS/ SH attending at bedside    Pt is a 84y , Male,admitted from OSH with NSTEMI; hx of ESRD on iHD    today:    pre op testing done  IV heparin  denies chest pain    HPI    83 yo M with ESRD on HD TTS, coronary artery disease s/p PCI, Type 2 diabetes, hypertension, hyperlipidemia, benign prostate hypertrophy, bladder diverticulum, and chronic hip pain who presented to [facility] complaining of right sided chest pain.  Patient was found to have NSTEMI.  He underwent a cardiac cath which showed 3v CAD.  He was started on heparin drip and transferred to [facility].  Patient reports last dialysis was saturday.  Patient denies chest pain, shortness of breath, nausea, vomiting on arrival.      , FAMILY HISTORY:  Known health problems: none (Father, Mother)    PAST MEDICAL & SURGICAL HISTORY:  HTN (hypertension)      Gout      DM (diabetes mellitus)      History of BPH      HLD (hyperlipidemia)      CAD (coronary artery disease)  one stent 6 yrs      End stage renal disease      History of cholecystectomy      S/P arteriovenous (AV) fistula creation  1 yr ago        Patient is a 84y old  Male who presents with a chief complaint of Coronary Artery Disease (2025 17:40)      14 system review was unremarkable    Vital signs, hemodynamic and respiratory parameters were reviewed from the bedside nursing flowsheet.  ICU Vital Signs Last 24 Hrs  T(C): 36.5 (2025 17:26), Max: 36.9 (2025 01:01)  T(F): 97.7 (2025 17:26), Max: 98.4 (2025 01:01)  HR: 68 (2025 18:00) (66 - 77)  BP: 145/65 (2025 18:00) (121/59 - 156/74)  BP(mean): 93 (2025 18:00) (83 - 106)  ABP: --  ABP(mean): --  RR: 18 (2025 18:00) (16 - 20)  SpO2: 98% (2025 18:00) (93% - 100%)    O2 Parameters below as of 2025 19:00  Patient On (Oxygen Delivery Method): nasal cannula w/ humidification  O2 Flow (L/min): 2        Adult Advanced Hemodynamics Last 24 Hrs  CVP(mm Hg): --  CVP(cm H2O): --  CO: --  CI: --  PA: --  PA(mean): --  PCWP: --  SVR: --  SVRI: --  PVR: --  PVRI: --,     Intake and output was reviewed and the fluid balance was calculated  Daily     Daily Weight in k.1 (2025 21:20)  I&O's Summary    2025 07:01  -  2025 07:00  --------------------------------------------------------  IN: 711.6 mL / OUT: 1000 mL / NET: -288.4 mL    2025 07:01  -  2025 18:42  --------------------------------------------------------  IN: 865.5 mL / OUT: 0 mL / NET: 865.5 mL        All lines and drain sites were assessed  Glycemic trend was reviewedCAPILLARY BLOOD GLUCOSE      POCT Blood Glucose.: 132 mg/dL (2025 16:00)    No acute change in mental status  Auscultation of the chest reveals equal bs  Abdomen is soft  Extremities are warm and well perfused  Wounds appear clean and unremarkable  Antibiotics are periop    labs  CBC Full  -  ( 2025 10:38 )  WBC Count : 7.94 K/uL  RBC Count : 3.30 M/uL  Hemoglobin : 9.9 g/dL  Hematocrit : 30.9 %  Platelet Count - Automated : 152 K/uL  Mean Cell Volume : 93.6 fl  Mean Cell Hemoglobin : 30.0 pg  Mean Cell Hemoglobin Concentration : 32.0 g/dL  Auto Neutrophil # : x  Auto Lymphocyte # : x  Auto Monocyte # : x  Auto Eosinophil # : x  Auto Basophil # : x  Auto Neutrophil % : x  Auto Lymphocyte % : x  Auto Monocyte % : x  Auto Eosinophil % : x  Auto Basophil % : x        138  |  94[L]  |  25[H]  ----------------------------<  134[H]  4.4   |  29  |  6.88[H]    Ca    9.0      2025 10:38  Phos  3.4       Mg     2.3         TPro  7.0  /  Alb  3.6  /  TBili  0.5  /  DBili  x   /  AST  16  /  ALT  16  /  AlkPhos  157[H]      PT/INR - ( 2025 10:38 )   PT: 11.8 sec;   INR: 1.03          PTT - ( 2025 10:38 )  PTT:67.4 sec  The current medications were reviewed   MEDICATIONS  (STANDING):  acetaminophen     Tablet .. 1000 milliGRAM(s) Oral once  amLODIPine   Tablet 5 milliGRAM(s) Oral daily  ascorbic acid 2000 milliGRAM(s) Oral once  aspirin enteric coated 81 milliGRAM(s) Oral daily  atorvastatin 40 milliGRAM(s) Oral at bedtime  calcium acetate 667 milliGRAM(s) Oral three times a day with meals  chlorhexidine 0.12% Liquid 5 milliLiter(s) Swish and Spit once  chlorhexidine 2% Cloths 1 Application(s) Topical daily  chlorhexidine 4% Liquid 1 Application(s) Topical once  chlorhexidine 4% Liquid 1 Application(s) Topical once  dextrose 5%. 1000 milliLiter(s) (50 mL/Hr) IV Continuous <Continuous>  dextrose 5%. 1000 milliLiter(s) (100 mL/Hr) IV Continuous <Continuous>  dextrose 50% Injectable 25 Gram(s) IV Push once  dextrose 50% Injectable 12.5 Gram(s) IV Push once  dextrose 50% Injectable 25 Gram(s) IV Push once  gabapentin 100 milliGRAM(s) Oral every 12 hours  glucagon  Injectable 1 milliGRAM(s) IntraMuscular once  heparin  Infusion 1060 Unit(s)/Hr (10.6 mL/Hr) IV Continuous <Continuous>  influenza  Vaccine (HIGH DOSE) 0.5 milliLiter(s) IntraMuscular once  insulin glargine Injectable (LANTUS) 16 Unit(s) SubCutaneous at bedtime  insulin lispro (ADMELOG) corrective regimen sliding scale   SubCutaneous Before meals and at bedtime  insulin lispro Injectable (ADMELOG) 5 Unit(s) SubCutaneous three times a day before meals  isosorbide   mononitrate ER Tablet (IMDUR) 60 milliGRAM(s) Oral daily  levETIRAcetam 500 milliGRAM(s) Oral two times a day  metoprolol tartrate 25 milliGRAM(s) Oral every 6 hours  mupirocin 2% Ointment 1 Application(s) Both Nostrils two times a day  nitroglycerin     SubLingual 0.4 milliGRAM(s) SubLingual every 5 minutes  pantoprazole    Tablet 40 milliGRAM(s) Oral before breakfast  polyethylene glycol 3350 17 Gram(s) Oral daily  senna 2 Tablet(s) Oral at bedtime  sevelamer carbonate 800 milliGRAM(s) Oral two times a day with meals  sodium chloride 0.9% lock flush 3 milliLiter(s) IV Push every 8 hours  tamsulosin 0.4 milliGRAM(s) Oral at bedtime    MEDICATIONS  (PRN):  dextrose Oral Gel 15 Gram(s) Oral once PRN Blood Glucose LESS THAN 70 milliGRAM(s)/deciliter       PROBLEM LIST/ ASSESSMENT:  HEALTH ISSUES - PROBLEM Dx:      ,   Patient is a 84y old  Male who presents with a chief complaint of Coronary Artery Disease (2025 17:40)    a/w NSTEMI; pre op for CABG tomorrow  acute changes include acute respiratory failure       My plan includes :    Heparin   goal PTT 60-80  renal f/u regarding timing of iHD ? before surgery  supplemental O2  Nitro for chest pain  NPO after midnight    close hemodynamic, ventilatory and drain monitoring and management per post op routine    Monitor for arrhythmias and monitor parameters for organ perfusion  monitor neurologic status  Head of the bed should remain elevated to 45 deg .   chest PT and IS will be encouraged  monitor adequacy of oxygenation and ventilation and attempt to wean oxygen  Nutritional goals will be met using po eventually , ensure adequate caloric intake and montior the same  Stress ulcer and VTE prophylaxis will be achieved    Glycemic control is satisfactory  Electrolytes have been repleted as necessary and wound care has been carried out. Pain control has been achieved.   agressive physical therapy and early mobility and ambulation goals will be met   The family was updated about the course and plan  CRITICAL CARE TIME SPENT in evaluation and management, reassessments, review and interpretation of labs and x-rays, ventilator and hemodynamic management, formulating a plan and coordinating care: ___75___ MIN.  Time does not include procedural time.  CTICU ATTENDING     					    hSay Rojas MD

## 2025-02-26 ENCOUNTER — TRANSCRIPTION ENCOUNTER (OUTPATIENT)
Age: 85
End: 2025-02-26

## 2025-02-26 ENCOUNTER — APPOINTMENT (OUTPATIENT)
Dept: CARDIOTHORACIC SURGERY | Facility: HOSPITAL | Age: 85
End: 2025-02-26

## 2025-02-26 LAB
ALBUMIN SERPL ELPH-MCNC: 3.1 G/DL — LOW (ref 3.3–5)
ALBUMIN SERPL ELPH-MCNC: 3.2 G/DL — LOW (ref 3.3–5)
ALBUMIN SERPL ELPH-MCNC: 3.3 G/DL — SIGNIFICANT CHANGE UP (ref 3.3–5)
ALBUMIN SERPL ELPH-MCNC: 3.6 G/DL — SIGNIFICANT CHANGE UP (ref 3.3–5)
ALP SERPL-CCNC: 105 U/L — SIGNIFICANT CHANGE UP (ref 40–120)
ALP SERPL-CCNC: 106 U/L — SIGNIFICANT CHANGE UP (ref 40–120)
ALP SERPL-CCNC: 143 U/L — HIGH (ref 40–120)
ALP SERPL-CCNC: 153 U/L — HIGH (ref 40–120)
ALT FLD-CCNC: 12 U/L — SIGNIFICANT CHANGE UP (ref 10–45)
ALT FLD-CCNC: 13 U/L — SIGNIFICANT CHANGE UP (ref 10–45)
ALT FLD-CCNC: 14 U/L — SIGNIFICANT CHANGE UP (ref 10–45)
ALT FLD-CCNC: SIGNIFICANT CHANGE UP (ref 10–45)
ANION GAP SERPL CALC-SCNC: 13 MMOL/L — SIGNIFICANT CHANGE UP (ref 5–17)
ANION GAP SERPL CALC-SCNC: 14 MMOL/L — SIGNIFICANT CHANGE UP (ref 5–17)
ANION GAP SERPL CALC-SCNC: 17 MMOL/L — SIGNIFICANT CHANGE UP (ref 5–17)
APTT BLD: 27.8 SEC — SIGNIFICANT CHANGE UP (ref 24.5–35.6)
APTT BLD: 36 SEC — HIGH (ref 24.5–35.6)
APTT BLD: 57.9 SEC — HIGH (ref 24.5–35.6)
AST SERPL-CCNC: 15 U/L — SIGNIFICANT CHANGE UP (ref 10–40)
AST SERPL-CCNC: 34 U/L — SIGNIFICANT CHANGE UP (ref 10–40)
AST SERPL-CCNC: 42 U/L — HIGH (ref 10–40)
AST SERPL-CCNC: SIGNIFICANT CHANGE UP (ref 10–40)
BASOPHILS # BLD AUTO: 0.01 K/UL — SIGNIFICANT CHANGE UP (ref 0–0.2)
BASOPHILS # BLD AUTO: 0.02 K/UL — SIGNIFICANT CHANGE UP (ref 0–0.2)
BASOPHILS NFR BLD AUTO: 0.2 % — SIGNIFICANT CHANGE UP (ref 0–2)
BASOPHILS NFR BLD AUTO: 0.2 % — SIGNIFICANT CHANGE UP (ref 0–2)
BILIRUB SERPL-MCNC: 0.3 MG/DL — SIGNIFICANT CHANGE UP (ref 0.2–1.2)
BILIRUB SERPL-MCNC: 0.3 MG/DL — SIGNIFICANT CHANGE UP (ref 0.2–1.2)
BILIRUB SERPL-MCNC: 0.5 MG/DL — SIGNIFICANT CHANGE UP (ref 0.2–1.2)
BILIRUB SERPL-MCNC: 0.7 MG/DL — SIGNIFICANT CHANGE UP (ref 0.2–1.2)
BUN SERPL-MCNC: 16 MG/DL — SIGNIFICANT CHANGE UP (ref 7–23)
BUN SERPL-MCNC: 20 MG/DL — SIGNIFICANT CHANGE UP (ref 7–23)
BUN SERPL-MCNC: 26 MG/DL — HIGH (ref 7–23)
BUN SERPL-MCNC: 30 MG/DL — HIGH (ref 7–23)
BUN SERPL-MCNC: 31 MG/DL — HIGH (ref 7–23)
CALCIUM SERPL-MCNC: 8.1 MG/DL — LOW (ref 8.4–10.5)
CALCIUM SERPL-MCNC: 8.2 MG/DL — LOW (ref 8.4–10.5)
CALCIUM SERPL-MCNC: 8.4 MG/DL — SIGNIFICANT CHANGE UP (ref 8.4–10.5)
CALCIUM SERPL-MCNC: 8.4 MG/DL — SIGNIFICANT CHANGE UP (ref 8.4–10.5)
CALCIUM SERPL-MCNC: 8.5 MG/DL — SIGNIFICANT CHANGE UP (ref 8.4–10.5)
CHLORIDE SERPL-SCNC: 92 MMOL/L — LOW (ref 96–108)
CHLORIDE SERPL-SCNC: 92 MMOL/L — LOW (ref 96–108)
CHLORIDE SERPL-SCNC: 97 MMOL/L — SIGNIFICANT CHANGE UP (ref 96–108)
CHLORIDE SERPL-SCNC: 98 MMOL/L — SIGNIFICANT CHANGE UP (ref 96–108)
CHLORIDE SERPL-SCNC: 98 MMOL/L — SIGNIFICANT CHANGE UP (ref 96–108)
CO2 SERPL-SCNC: 24 MMOL/L — SIGNIFICANT CHANGE UP (ref 22–31)
CO2 SERPL-SCNC: 26 MMOL/L — SIGNIFICANT CHANGE UP (ref 22–31)
CO2 SERPL-SCNC: 28 MMOL/L — SIGNIFICANT CHANGE UP (ref 22–31)
CREAT SERPL-MCNC: 4.63 MG/DL — HIGH (ref 0.5–1.3)
CREAT SERPL-MCNC: 4.64 MG/DL — HIGH (ref 0.5–1.3)
CREAT SERPL-MCNC: 6.97 MG/DL — HIGH (ref 0.5–1.3)
CREAT SERPL-MCNC: 7.72 MG/DL — HIGH (ref 0.5–1.3)
CREAT SERPL-MCNC: 8 MG/DL — HIGH (ref 0.5–1.3)
EGFR: 12 ML/MIN/1.73M2 — LOW
EGFR: 6 ML/MIN/1.73M2 — LOW
EGFR: 7 ML/MIN/1.73M2 — LOW
EGFR: 7 ML/MIN/1.73M2 — LOW
EOSINOPHIL # BLD AUTO: 0.02 K/UL — SIGNIFICANT CHANGE UP (ref 0–0.5)
EOSINOPHIL # BLD AUTO: 0.02 K/UL — SIGNIFICANT CHANGE UP (ref 0–0.5)
EOSINOPHIL NFR BLD AUTO: 0.2 % — SIGNIFICANT CHANGE UP (ref 0–6)
EOSINOPHIL NFR BLD AUTO: 0.3 % — SIGNIFICANT CHANGE UP (ref 0–6)
GAS PNL BLDA: SIGNIFICANT CHANGE UP
GLUCOSE SERPL-MCNC: 109 MG/DL — HIGH (ref 70–99)
GLUCOSE SERPL-MCNC: 149 MG/DL — HIGH (ref 70–99)
GLUCOSE SERPL-MCNC: 151 MG/DL — HIGH (ref 70–99)
GLUCOSE SERPL-MCNC: 187 MG/DL — HIGH (ref 70–99)
GLUCOSE SERPL-MCNC: 195 MG/DL — HIGH (ref 70–99)
HCT VFR BLD CALC: 28.5 % — LOW (ref 39–50)
HCT VFR BLD CALC: 30.1 % — LOW (ref 39–50)
HCT VFR BLD CALC: 30.2 % — LOW (ref 39–50)
HGB BLD-MCNC: 10.1 G/DL — LOW (ref 13–17)
HGB BLD-MCNC: 9.6 G/DL — LOW (ref 13–17)
HGB BLD-MCNC: 9.8 G/DL — LOW (ref 13–17)
IMM GRANULOCYTES NFR BLD AUTO: 0.7 % — SIGNIFICANT CHANGE UP (ref 0–0.9)
IMM GRANULOCYTES NFR BLD AUTO: 1.2 % — HIGH (ref 0–0.9)
INR BLD: 0.93 — SIGNIFICANT CHANGE UP (ref 0.85–1.16)
INR BLD: 0.99 — SIGNIFICANT CHANGE UP (ref 0.85–1.16)
INR BLD: 1 — SIGNIFICANT CHANGE UP (ref 0.85–1.16)
LYMPHOCYTES # BLD AUTO: 0.55 K/UL — LOW (ref 1–3.3)
LYMPHOCYTES # BLD AUTO: 0.71 K/UL — LOW (ref 1–3.3)
LYMPHOCYTES # BLD AUTO: 6.1 % — LOW (ref 13–44)
LYMPHOCYTES # BLD AUTO: 8.5 % — LOW (ref 13–44)
MAGNESIUM SERPL-MCNC: 2.3 MG/DL — SIGNIFICANT CHANGE UP (ref 1.6–2.6)
MAGNESIUM SERPL-MCNC: 2.3 MG/DL — SIGNIFICANT CHANGE UP (ref 1.6–2.6)
MAGNESIUM SERPL-MCNC: 2.9 MG/DL — HIGH (ref 1.6–2.6)
MCHC RBC-ENTMCNC: 30.2 PG — SIGNIFICANT CHANGE UP (ref 27–34)
MCHC RBC-ENTMCNC: 30.9 PG — SIGNIFICANT CHANGE UP (ref 27–34)
MCHC RBC-ENTMCNC: 31 PG — SIGNIFICANT CHANGE UP (ref 27–34)
MCHC RBC-ENTMCNC: 32.6 G/DL — SIGNIFICANT CHANGE UP (ref 32–36)
MCHC RBC-ENTMCNC: 33.4 G/DL — SIGNIFICANT CHANGE UP (ref 32–36)
MCHC RBC-ENTMCNC: 33.7 G/DL — SIGNIFICANT CHANGE UP (ref 32–36)
MCV RBC AUTO: 91.6 FL — SIGNIFICANT CHANGE UP (ref 80–100)
MCV RBC AUTO: 92.6 FL — SIGNIFICANT CHANGE UP (ref 80–100)
MCV RBC AUTO: 92.9 FL — SIGNIFICANT CHANGE UP (ref 80–100)
MONOCYTES # BLD AUTO: 0.58 K/UL — SIGNIFICANT CHANGE UP (ref 0–0.9)
MONOCYTES # BLD AUTO: 0.98 K/UL — HIGH (ref 0–0.9)
MONOCYTES NFR BLD AUTO: 8.4 % — SIGNIFICANT CHANGE UP (ref 2–14)
MONOCYTES NFR BLD AUTO: 9 % — SIGNIFICANT CHANGE UP (ref 2–14)
NEUTROPHILS # BLD AUTO: 5.22 K/UL — SIGNIFICANT CHANGE UP (ref 1.8–7.4)
NEUTROPHILS # BLD AUTO: 9.9 K/UL — HIGH (ref 1.8–7.4)
NEUTROPHILS NFR BLD AUTO: 80.8 % — HIGH (ref 43–77)
NEUTROPHILS NFR BLD AUTO: 84.4 % — HIGH (ref 43–77)
NRBC BLD AUTO-RTO: 0 /100 WBCS — SIGNIFICANT CHANGE UP (ref 0–0)
PHOSPHATE SERPL-MCNC: 2.5 MG/DL — SIGNIFICANT CHANGE UP (ref 2.5–4.5)
PHOSPHATE SERPL-MCNC: 3.5 MG/DL — SIGNIFICANT CHANGE UP (ref 2.5–4.5)
PHOSPHATE SERPL-MCNC: 4.6 MG/DL — HIGH (ref 2.5–4.5)
PLATELET # BLD AUTO: 147 K/UL — LOW (ref 150–400)
PLATELET # BLD AUTO: 159 K/UL — SIGNIFICANT CHANGE UP (ref 150–400)
PLATELET # BLD AUTO: 96 K/UL — LOW (ref 150–400)
POTASSIUM SERPL-MCNC: 4.3 MMOL/L — SIGNIFICANT CHANGE UP (ref 3.5–5.3)
POTASSIUM SERPL-MCNC: 4.5 MMOL/L — SIGNIFICANT CHANGE UP (ref 3.5–5.3)
POTASSIUM SERPL-MCNC: 4.7 MMOL/L — SIGNIFICANT CHANGE UP (ref 3.5–5.3)
POTASSIUM SERPL-MCNC: 5.7 MMOL/L — HIGH (ref 3.5–5.3)
POTASSIUM SERPL-MCNC: SIGNIFICANT CHANGE UP (ref 3.5–5.3)
POTASSIUM SERPL-SCNC: 4.3 MMOL/L — SIGNIFICANT CHANGE UP (ref 3.5–5.3)
POTASSIUM SERPL-SCNC: 4.5 MMOL/L — SIGNIFICANT CHANGE UP (ref 3.5–5.3)
POTASSIUM SERPL-SCNC: 4.7 MMOL/L — SIGNIFICANT CHANGE UP (ref 3.5–5.3)
POTASSIUM SERPL-SCNC: 5.7 MMOL/L — HIGH (ref 3.5–5.3)
POTASSIUM SERPL-SCNC: SIGNIFICANT CHANGE UP (ref 3.5–5.3)
PROT SERPL-MCNC: 5.2 G/DL — LOW (ref 6–8.3)
PROT SERPL-MCNC: 5.5 G/DL — LOW (ref 6–8.3)
PROT SERPL-MCNC: 6.5 G/DL — SIGNIFICANT CHANGE UP (ref 6–8.3)
PROT SERPL-MCNC: 6.7 G/DL — SIGNIFICANT CHANGE UP (ref 6–8.3)
PROTHROM AB SERPL-ACNC: 10.7 SEC — SIGNIFICANT CHANGE UP (ref 9.9–13.4)
PROTHROM AB SERPL-ACNC: 11.6 SEC — SIGNIFICANT CHANGE UP (ref 9.9–13.4)
PROTHROM AB SERPL-ACNC: 11.7 SEC — SIGNIFICANT CHANGE UP (ref 9.9–13.4)
RBC # BLD: 3.11 M/UL — LOW (ref 4.2–5.8)
RBC # BLD: 3.24 M/UL — LOW (ref 4.2–5.8)
RBC # BLD: 3.26 M/UL — LOW (ref 4.2–5.8)
RBC # FLD: 13.4 % — SIGNIFICANT CHANGE UP (ref 10.3–14.5)
RBC # FLD: 13.4 % — SIGNIFICANT CHANGE UP (ref 10.3–14.5)
RBC # FLD: 13.6 % — SIGNIFICANT CHANGE UP (ref 10.3–14.5)
SODIUM SERPL-SCNC: 134 MMOL/L — LOW (ref 135–145)
SODIUM SERPL-SCNC: 134 MMOL/L — LOW (ref 135–145)
SODIUM SERPL-SCNC: 135 MMOL/L — SIGNIFICANT CHANGE UP (ref 135–145)
WBC # BLD: 11.71 K/UL — HIGH (ref 3.8–10.5)
WBC # BLD: 6.46 K/UL — SIGNIFICANT CHANGE UP (ref 3.8–10.5)
WBC # BLD: 7.8 K/UL — SIGNIFICANT CHANGE UP (ref 3.8–10.5)
WBC # FLD AUTO: 11.71 K/UL — HIGH (ref 3.8–10.5)
WBC # FLD AUTO: 6.46 K/UL — SIGNIFICANT CHANGE UP (ref 3.8–10.5)
WBC # FLD AUTO: 7.8 K/UL — SIGNIFICANT CHANGE UP (ref 3.8–10.5)

## 2025-02-26 PROCEDURE — 33533 CABG ARTERIAL SINGLE: CPT

## 2025-02-26 PROCEDURE — 93010 ELECTROCARDIOGRAM REPORT: CPT

## 2025-02-26 PROCEDURE — 99292 CRITICAL CARE ADDL 30 MIN: CPT

## 2025-02-26 PROCEDURE — 33517 CABG ARTERY-VEIN SINGLE: CPT

## 2025-02-26 PROCEDURE — 71045 X-RAY EXAM CHEST 1 VIEW: CPT | Mod: 26,76

## 2025-02-26 PROCEDURE — 99291 CRITICAL CARE FIRST HOUR: CPT

## 2025-02-26 PROCEDURE — 33517 CABG ARTERY-VEIN SINGLE: CPT | Mod: AS

## 2025-02-26 PROCEDURE — 33508 ENDOSCOPIC VEIN HARVEST: CPT | Mod: AS,59

## 2025-02-26 PROCEDURE — 33533 CABG ARTERIAL SINGLE: CPT | Mod: AS

## 2025-02-26 DEVICE — CATH VENT LEFT HEART SILICONE 16FR NON-VENTED: Type: IMPLANTABLE DEVICE | Status: FUNCTIONAL

## 2025-02-26 DEVICE — KIT CVC 3LUM SPECTRUM 9FR: Type: IMPLANTABLE DEVICE | Status: FUNCTIONAL

## 2025-02-26 DEVICE — INTRODUCER PERCUTANEOUS INSERTION KIT: Type: IMPLANTABLE DEVICE | Status: FUNCTIONAL

## 2025-02-26 DEVICE — SURGICEL FIBRILLAR 4 X 4": Type: IMPLANTABLE DEVICE | Status: FUNCTIONAL

## 2025-02-26 DEVICE — LIGATING CLIPS WECK HORIZON SMALL (YELLOW) 24: Type: IMPLANTABLE DEVICE | Status: FUNCTIONAL

## 2025-02-26 DEVICE — CANNULA ARTERIAL OPTISITE 20FR X 3/8" VENTED: Type: IMPLANTABLE DEVICE | Status: FUNCTIONAL

## 2025-02-26 DEVICE — CANNULA ARTERIOTOMY 2MM X 1.3": Type: IMPLANTABLE DEVICE | Status: FUNCTIONAL

## 2025-02-26 DEVICE — SURGIFLO HEMOSTATIC MATRIX KIT: Type: IMPLANTABLE DEVICE | Status: FUNCTIONAL

## 2025-02-26 DEVICE — CANNULA AORTIC ROOT WITH VENT LINE 14G X 14CM FLANGED: Type: IMPLANTABLE DEVICE | Status: FUNCTIONAL

## 2025-02-26 DEVICE — PACING WIRE BLUE BIPOLAR INLINE BM SERIES 23MM CURVE SOLID 60CM 8MM: Type: IMPLANTABLE DEVICE | Status: FUNCTIONAL

## 2025-02-26 DEVICE — CANNULA VENOUS 2 STAGE MC2 32/40FR X 1/2" OVAL BODY NON-VENTED: Type: IMPLANTABLE DEVICE | Status: FUNCTIONAL

## 2025-02-26 DEVICE — CANNULA VESSEL 3MM BEVELED TIP RADIOPAQUE: Type: IMPLANTABLE DEVICE | Status: FUNCTIONAL

## 2025-02-26 DEVICE — LIGATING CLIPS WECK HORIZON MEDIUM (BLUE) 24: Type: IMPLANTABLE DEVICE | Status: FUNCTIONAL

## 2025-02-26 DEVICE — SHUNT FLO-THRU INTRALUMINAL1.5MM X 18MM: Type: IMPLANTABLE DEVICE | Status: FUNCTIONAL

## 2025-02-26 DEVICE — LIGATING CLIPS WECK HORIZON SMALL-WIDE (RED) 24: Type: IMPLANTABLE DEVICE | Status: FUNCTIONAL

## 2025-02-26 DEVICE — CHEST DRAIN THORACIC PVC 32FR: Type: IMPLANTABLE DEVICE | Status: FUNCTIONAL

## 2025-02-26 DEVICE — TACHOSIL 9.5 X 4.8CM: Type: IMPLANTABLE DEVICE | Status: FUNCTIONAL

## 2025-02-26 DEVICE — LUKENS BONE WAX 2.5G: Type: IMPLANTABLE DEVICE | Status: FUNCTIONAL

## 2025-02-26 DEVICE — PACING WIRE WHITE M-20 BAREWIRE 89MM: Type: IMPLANTABLE DEVICE | Status: FUNCTIONAL

## 2025-02-26 RX ORDER — DEXTROSE 50 % IN WATER 50 %
25 SYRINGE (ML) INTRAVENOUS
Refills: 0 | Status: DISCONTINUED | OUTPATIENT
Start: 2025-02-26 | End: 2025-03-10

## 2025-02-26 RX ORDER — TAMSULOSIN HYDROCHLORIDE 0.4 MG/1
0.4 CAPSULE ORAL AT BEDTIME
Refills: 0 | Status: DISCONTINUED | OUTPATIENT
Start: 2025-02-27 | End: 2025-03-02

## 2025-02-26 RX ORDER — SEVELAMER HYDROCHLORIDE 800 MG/1
800 TABLET ORAL EVERY 12 HOURS
Refills: 0 | Status: DISCONTINUED | OUTPATIENT
Start: 2025-02-26 | End: 2025-02-27

## 2025-02-26 RX ORDER — LEVETIRACETAM 10 MG/ML
500 INJECTION, SOLUTION INTRAVENOUS
Refills: 0 | Status: DISCONTINUED | OUTPATIENT
Start: 2025-02-26 | End: 2025-02-28

## 2025-02-26 RX ORDER — BISACODYL 5 MG
10 TABLET, DELAYED RELEASE (ENTERIC COATED) ORAL ONCE
Refills: 0 | Status: DISCONTINUED | OUTPATIENT
Start: 2025-02-28 | End: 2025-03-01

## 2025-02-26 RX ORDER — BUMETANIDE 1 MG/1
2 TABLET ORAL ONCE
Refills: 0 | Status: COMPLETED | OUTPATIENT
Start: 2025-02-26 | End: 2025-02-26

## 2025-02-26 RX ORDER — BUPIVACAINE 13.3 MG/ML
20 INJECTION, SUSPENSION, LIPOSOMAL INFILTRATION ONCE
Refills: 0 | Status: DISCONTINUED | OUTPATIENT
Start: 2025-02-26 | End: 2025-02-26

## 2025-02-26 RX ORDER — ACETAMINOPHEN 500 MG/5ML
1000 LIQUID (ML) ORAL EVERY 6 HOURS
Refills: 0 | Status: COMPLETED | OUTPATIENT
Start: 2025-02-26 | End: 2025-02-27

## 2025-02-26 RX ORDER — CEFAZOLIN SODIUM IN 0.9 % NACL 3 G/100 ML
2000 INTRAVENOUS SOLUTION, PIGGYBACK (ML) INTRAVENOUS EVERY 8 HOURS
Refills: 0 | Status: DISCONTINUED | OUTPATIENT
Start: 2025-02-26 | End: 2025-02-27

## 2025-02-26 RX ORDER — MUPIROCIN CALCIUM 20 MG/G
1 CREAM TOPICAL
Refills: 0 | Status: COMPLETED | OUTPATIENT
Start: 2025-02-26 | End: 2025-03-03

## 2025-02-26 RX ORDER — ACETAMINOPHEN 500 MG/5ML
975 LIQUID (ML) ORAL EVERY 6 HOURS
Refills: 0 | Status: DISCONTINUED | OUTPATIENT
Start: 2025-03-02 | End: 2025-03-02

## 2025-02-26 RX ORDER — SODIUM BICARBONATE 1 MEQ/ML
0.46 SYRINGE (ML) INTRAVENOUS
Qty: 150 | Refills: 0 | Status: DISCONTINUED | OUTPATIENT
Start: 2025-02-26 | End: 2025-02-26

## 2025-02-26 RX ORDER — ACETAMINOPHEN 500 MG/5ML
650 LIQUID (ML) ORAL EVERY 6 HOURS
Refills: 0 | Status: COMPLETED | OUTPATIENT
Start: 2025-02-27 | End: 2025-03-01

## 2025-02-26 RX ORDER — ASPIRIN 325 MG
300 TABLET ORAL ONCE
Refills: 0 | Status: DISCONTINUED | OUTPATIENT
Start: 2025-02-26 | End: 2025-02-26

## 2025-02-26 RX ORDER — ASPIRIN 325 MG
81 TABLET ORAL DAILY
Refills: 0 | Status: DISCONTINUED | OUTPATIENT
Start: 2025-02-26 | End: 2025-03-10

## 2025-02-26 RX ORDER — GABAPENTIN 400 MG/1
100 CAPSULE ORAL EVERY 8 HOURS
Refills: 0 | Status: DISCONTINUED | OUTPATIENT
Start: 2025-02-26 | End: 2025-03-03

## 2025-02-26 RX ORDER — SENNA 187 MG
2 TABLET ORAL AT BEDTIME
Refills: 0 | Status: DISCONTINUED | OUTPATIENT
Start: 2025-02-27 | End: 2025-03-10

## 2025-02-26 RX ORDER — NOREPINEPHRINE BITARTRATE 8 MG
0.05 SOLUTION INTRAVENOUS
Qty: 8 | Refills: 0 | Status: DISCONTINUED | OUTPATIENT
Start: 2025-02-26 | End: 2025-02-27

## 2025-02-26 RX ORDER — ATORVASTATIN CALCIUM 80 MG/1
40 TABLET, FILM COATED ORAL AT BEDTIME
Refills: 0 | Status: DISCONTINUED | OUTPATIENT
Start: 2025-02-26 | End: 2025-03-10

## 2025-02-26 RX ORDER — POLYETHYLENE GLYCOL 3350 17 G/17G
17 POWDER, FOR SOLUTION ORAL DAILY
Refills: 0 | Status: DISCONTINUED | OUTPATIENT
Start: 2025-02-27 | End: 2025-03-10

## 2025-02-26 RX ORDER — DEXTROSE 50 % IN WATER 50 %
50 SYRINGE (ML) INTRAVENOUS ONCE
Refills: 0 | Status: COMPLETED | OUTPATIENT
Start: 2025-02-26 | End: 2025-02-26

## 2025-02-26 RX ORDER — BUPIVACAINE 13.3 MG/ML
20 INJECTION, SUSPENSION, LIPOSOMAL INFILTRATION ONCE
Refills: 0 | Status: DISCONTINUED | OUTPATIENT
Start: 2025-02-26 | End: 2025-02-27

## 2025-02-26 RX ORDER — VASOPRESSIN 20 [USP'U]/ML
0.02 INJECTION INTRAVENOUS
Qty: 40 | Refills: 0 | Status: DISCONTINUED | OUTPATIENT
Start: 2025-02-26 | End: 2025-02-27

## 2025-02-26 RX ORDER — CLOPIDOGREL BISULFATE 75 MG/1
75 TABLET, FILM COATED ORAL DAILY
Refills: 0 | Status: DISCONTINUED | OUTPATIENT
Start: 2025-02-27 | End: 2025-03-10

## 2025-02-26 RX ORDER — HEPARIN SODIUM 1000 [USP'U]/ML
5000 INJECTION INTRAVENOUS; SUBCUTANEOUS EVERY 8 HOURS
Refills: 0 | Status: DISCONTINUED | OUTPATIENT
Start: 2025-02-26 | End: 2025-03-10

## 2025-02-26 RX ORDER — DEXTROSE 50 % IN WATER 50 %
50 SYRINGE (ML) INTRAVENOUS
Refills: 0 | Status: DISCONTINUED | OUTPATIENT
Start: 2025-02-26 | End: 2025-03-10

## 2025-02-26 RX ADMIN — GABAPENTIN 100 MILLIGRAM(S): 400 CAPSULE ORAL at 21:22

## 2025-02-26 RX ADMIN — MUPIROCIN CALCIUM 1 APPLICATION(S): 20 CREAM TOPICAL at 07:02

## 2025-02-26 RX ADMIN — Medication 1000 MILLIGRAM(S): at 19:47

## 2025-02-26 RX ADMIN — VASOPRESSIN 3 UNIT(S)/MIN: 20 INJECTION INTRAVENOUS at 18:23

## 2025-02-26 RX ADMIN — Medication 1000 MILLIGRAM(S): at 08:27

## 2025-02-26 RX ADMIN — Medication 1 APPLICATION(S): at 05:03

## 2025-02-26 RX ADMIN — Medication 400 MILLIGRAM(S): at 23:27

## 2025-02-26 RX ADMIN — NOREPINEPHRINE BITARTRATE 7.73 MICROGRAM(S)/KG/MIN: 8 SOLUTION at 18:06

## 2025-02-26 RX ADMIN — Medication 40 MILLIGRAM(S): at 07:02

## 2025-02-26 RX ADMIN — ATORVASTATIN CALCIUM 40 MILLIGRAM(S): 80 TABLET, FILM COATED ORAL at 21:21

## 2025-02-26 RX ADMIN — BUMETANIDE 2 MILLIGRAM(S): 1 TABLET ORAL at 18:05

## 2025-02-26 RX ADMIN — METOPROLOL SUCCINATE 25 MILLIGRAM(S): 50 TABLET, EXTENDED RELEASE ORAL at 07:02

## 2025-02-26 RX ADMIN — Medication 3 MILLILITER(S): at 07:03

## 2025-02-26 RX ADMIN — AMLODIPINE BESYLATE 5 MILLIGRAM(S): 10 TABLET ORAL at 07:02

## 2025-02-26 RX ADMIN — METOPROLOL SUCCINATE 25 MILLIGRAM(S): 50 TABLET, EXTENDED RELEASE ORAL at 00:10

## 2025-02-26 RX ADMIN — GABAPENTIN 100 MILLIGRAM(S): 400 CAPSULE ORAL at 07:02

## 2025-02-26 RX ADMIN — LEVETIRACETAM 500 MILLIGRAM(S): 10 INJECTION, SOLUTION INTRAVENOUS at 18:48

## 2025-02-26 RX ADMIN — MUPIROCIN CALCIUM 1 APPLICATION(S): 20 CREAM TOPICAL at 18:23

## 2025-02-26 RX ADMIN — Medication 15 MILLILITER(S): at 18:23

## 2025-02-26 RX ADMIN — Medication 400 MILLIGRAM(S): at 18:23

## 2025-02-26 RX ADMIN — Medication 81 MILLIGRAM(S): at 23:26

## 2025-02-26 RX ADMIN — LEVETIRACETAM 500 MILLIGRAM(S): 10 INJECTION, SOLUTION INTRAVENOUS at 07:02

## 2025-02-26 RX ADMIN — INSULIN LISPRO 2: 100 INJECTION, SOLUTION INTRAVENOUS; SUBCUTANEOUS at 07:55

## 2025-02-26 RX ADMIN — Medication 5 MILLILITER(S): at 07:02

## 2025-02-26 RX ADMIN — Medication 100 MILLIGRAM(S): at 21:22

## 2025-02-26 RX ADMIN — Medication 5 MILLIGRAM(S): at 02:40

## 2025-02-26 NOTE — BRIEF OPERATIVE NOTE - NSICDXBRIEFPROCEDURE_GEN_ALL_CORE_FT
PROCEDURES:  CABG, with ZAIRE 26-Feb-2025 17:16:07 Two Vessel CABG (LIMA-LAD, SVG-OM2) Martha Montalvo

## 2025-02-26 NOTE — PROGRESS NOTE ADULT - ASSESSMENT
84M w/ESRD on HD TTS, coronary artery disease s/p PCI, T2DM, HTN, BPH presented to [facility] with angina an ruled in fo NSTEMI. He underwent cardiac cath which showed 3vCAD.  He was started on heparin drip and transferred to [facility] for CABG eval.  S/p CABG x 2 (LIMA-LAD, SVG-OM2) EF NL  2/26  2PCs intraop  A/p  Vasoplegic on levo and vaso, weaning for MAP goal ~70 mmhg  Drains quiet/ H&H in good range   ESRD last HD 2/25- post op mild hyperkalemia / no temp HD cath in place - plan for mild HD session for clearance only cannulating AVF  To obtain HD access and start CVVHD for volume mgmt and optimization prior to extubation   To resume keppra ( has been on it for seizure ppx post stroke per [place] report)  Insulin gtt for hyperglycemia mgmt   ICU ppx/DAPT and statin  CPAP for extubation trial in am     ATTENDING: I have personally and independently provided 105 min of critical care services. This excludes any time spent on separate procedures or teaching.

## 2025-02-26 NOTE — BRIEF OPERATIVE NOTE - COMMENTS
Unable to calculate blood loss due to use of cell saver.  Unable to calculate blood loss due to use of cell saver.     Vein harvest time: 60 mins  Vein prep time: 15 mins

## 2025-02-26 NOTE — PROGRESS NOTE ADULT - SUBJECTIVE AND OBJECTIVE BOX
INTERVAL COURSE  POD#0   CABGx 2 normal EF   Arrived intubated on levo and vaso   labs in good range except for mildly elevated K, last HD 2/25  Woke up non focal/ Drains quiet     VITALS  Vital Signs Last 24 Hrs  T(C): 36.1 (26 Feb 2025 21:20), Max: 36.7 (26 Feb 2025 08:47)  T(F): 96.9 (26 Feb 2025 21:20), Max: 98 (26 Feb 2025 08:47)  HR: 71 (26 Feb 2025 21:30) (68 - 99)  BP: 153/69 (26 Feb 2025 08:47) (132/59 - 167/71)  BP(mean): 100 (26 Feb 2025 08:00) (83 - 103)  RR: 12 (26 Feb 2025 21:30) (12 - 18)  SpO2: 100% (26 Feb 2025 21:30) (96% - 100%)    Parameters below as of 26 Feb 2025 21:30  Patient On (Oxygen Delivery Method): ventilator    O2 Concentration (%): 50    I&O's Summary  25 Feb 2025 07:01  -  26 Feb 2025 07:00  --------------------------------------------------------  IN: 1102 mL / OUT: 0 mL / NET: 1102 mL    26 Feb 2025 07:01  -  26 Feb 2025 21:55  --------------------------------------------------------  IN: 461.5 mL / OUT: 735 mL / NET: -273.5 mL        PHYSICAL EXAM  General: sedated and intubated, RASS -3  Respiratory: CTA B/L; no wheezes  Cardiovascular: Regular rhythm/rate  Gastrointestinal: Soft; NTND   Extremities: WWP; no edema, right arm fistula good thrill   Neurological:  deferred     LABS/IMAGING/EKG/ETC  Reviewed.

## 2025-02-27 LAB
ALBUMIN SERPL ELPH-MCNC: 3.1 G/DL — LOW (ref 3.3–5)
ALBUMIN SERPL ELPH-MCNC: 3.3 G/DL — SIGNIFICANT CHANGE UP (ref 3.3–5)
ALBUMIN SERPL ELPH-MCNC: 3.3 G/DL — SIGNIFICANT CHANGE UP (ref 3.3–5)
ALBUMIN SERPL ELPH-MCNC: 3.5 G/DL — SIGNIFICANT CHANGE UP (ref 3.3–5)
ALBUMIN SERPL ELPH-MCNC: 3.8 G/DL — SIGNIFICANT CHANGE UP (ref 3.3–5)
ALP SERPL-CCNC: 102 U/L — SIGNIFICANT CHANGE UP (ref 40–120)
ALP SERPL-CCNC: 107 U/L — SIGNIFICANT CHANGE UP (ref 40–120)
ALP SERPL-CCNC: 118 U/L — SIGNIFICANT CHANGE UP (ref 40–120)
ALP SERPL-CCNC: 123 U/L — HIGH (ref 40–120)
ALP SERPL-CCNC: 54 U/L — SIGNIFICANT CHANGE UP (ref 40–120)
ALT FLD-CCNC: 10 U/L — SIGNIFICANT CHANGE UP (ref 10–45)
ALT FLD-CCNC: 14 U/L — SIGNIFICANT CHANGE UP (ref 10–45)
ALT FLD-CCNC: 6 U/L — LOW (ref 10–45)
ALT FLD-CCNC: 8 U/L — LOW (ref 10–45)
ALT FLD-CCNC: <5 U/L — LOW (ref 10–45)
ANION GAP SERPL CALC-SCNC: 12 MMOL/L — SIGNIFICANT CHANGE UP (ref 5–17)
ANION GAP SERPL CALC-SCNC: 13 MMOL/L — SIGNIFICANT CHANGE UP (ref 5–17)
ANION GAP SERPL CALC-SCNC: 14 MMOL/L — SIGNIFICANT CHANGE UP (ref 5–17)
ANION GAP SERPL CALC-SCNC: 15 MMOL/L — SIGNIFICANT CHANGE UP (ref 5–17)
ANION GAP SERPL CALC-SCNC: 16 MMOL/L — SIGNIFICANT CHANGE UP (ref 5–17)
APTT BLD: 27.3 SEC — SIGNIFICANT CHANGE UP (ref 24.5–35.6)
APTT BLD: 28 SEC — SIGNIFICANT CHANGE UP (ref 24.5–35.6)
APTT BLD: 28.3 SEC — SIGNIFICANT CHANGE UP (ref 24.5–35.6)
APTT BLD: 29.1 SEC — SIGNIFICANT CHANGE UP (ref 24.5–35.6)
AST SERPL-CCNC: 35 U/L — SIGNIFICANT CHANGE UP (ref 10–40)
AST SERPL-CCNC: 37 U/L — SIGNIFICANT CHANGE UP (ref 10–40)
AST SERPL-CCNC: 38 U/L — SIGNIFICANT CHANGE UP (ref 10–40)
AST SERPL-CCNC: 41 U/L — HIGH (ref 10–40)
AST SERPL-CCNC: 41 U/L — HIGH (ref 10–40)
BASOPHILS # BLD AUTO: 0.02 K/UL — SIGNIFICANT CHANGE UP (ref 0–0.2)
BASOPHILS # BLD AUTO: 0.03 K/UL — SIGNIFICANT CHANGE UP (ref 0–0.2)
BASOPHILS # BLD AUTO: 0.03 K/UL — SIGNIFICANT CHANGE UP (ref 0–0.2)
BASOPHILS # BLD AUTO: 0.15 K/UL — SIGNIFICANT CHANGE UP (ref 0–0.2)
BASOPHILS NFR BLD AUTO: 0.2 % — SIGNIFICANT CHANGE UP (ref 0–2)
BASOPHILS NFR BLD AUTO: 0.3 % — SIGNIFICANT CHANGE UP (ref 0–2)
BASOPHILS NFR BLD AUTO: 0.3 % — SIGNIFICANT CHANGE UP (ref 0–2)
BASOPHILS NFR BLD AUTO: 0.9 % — SIGNIFICANT CHANGE UP (ref 0–2)
BILIRUB SERPL-MCNC: 0.4 MG/DL — SIGNIFICANT CHANGE UP (ref 0.2–1.2)
BILIRUB SERPL-MCNC: 0.4 MG/DL — SIGNIFICANT CHANGE UP (ref 0.2–1.2)
BILIRUB SERPL-MCNC: 0.5 MG/DL — SIGNIFICANT CHANGE UP (ref 0.2–1.2)
BILIRUB SERPL-MCNC: 0.5 MG/DL — SIGNIFICANT CHANGE UP (ref 0.2–1.2)
BILIRUB SERPL-MCNC: 1.5 MG/DL — HIGH (ref 0.2–1.2)
BLD GP AB SCN SERPL QL: NEGATIVE — SIGNIFICANT CHANGE UP
BUN SERPL-MCNC: 14 MG/DL — SIGNIFICANT CHANGE UP (ref 7–23)
BUN SERPL-MCNC: 14 MG/DL — SIGNIFICANT CHANGE UP (ref 7–23)
BUN SERPL-MCNC: 21 MG/DL — SIGNIFICANT CHANGE UP (ref 7–23)
BUN SERPL-MCNC: 21 MG/DL — SIGNIFICANT CHANGE UP (ref 7–23)
BUN SERPL-MCNC: 22 MG/DL — SIGNIFICANT CHANGE UP (ref 7–23)
CALCIUM SERPL-MCNC: 7.8 MG/DL — LOW (ref 8.4–10.5)
CALCIUM SERPL-MCNC: 8.4 MG/DL — SIGNIFICANT CHANGE UP (ref 8.4–10.5)
CALCIUM SERPL-MCNC: 8.5 MG/DL — SIGNIFICANT CHANGE UP (ref 8.4–10.5)
CALCIUM SERPL-MCNC: 8.6 MG/DL — SIGNIFICANT CHANGE UP (ref 8.4–10.5)
CALCIUM SERPL-MCNC: 8.7 MG/DL — SIGNIFICANT CHANGE UP (ref 8.4–10.5)
CHLORIDE SERPL-SCNC: 93 MMOL/L — LOW (ref 96–108)
CHLORIDE SERPL-SCNC: 94 MMOL/L — LOW (ref 96–108)
CHLORIDE SERPL-SCNC: 96 MMOL/L — SIGNIFICANT CHANGE UP (ref 96–108)
CHLORIDE SERPL-SCNC: 97 MMOL/L — SIGNIFICANT CHANGE UP (ref 96–108)
CHLORIDE SERPL-SCNC: 97 MMOL/L — SIGNIFICANT CHANGE UP (ref 96–108)
CO2 SERPL-SCNC: 24 MMOL/L — SIGNIFICANT CHANGE UP (ref 22–31)
CO2 SERPL-SCNC: 25 MMOL/L — SIGNIFICANT CHANGE UP (ref 22–31)
CREAT SERPL-MCNC: 0.83 MG/DL — SIGNIFICANT CHANGE UP (ref 0.5–1.3)
CREAT SERPL-MCNC: 3.63 MG/DL — HIGH (ref 0.5–1.3)
CREAT SERPL-MCNC: 4.71 MG/DL — HIGH (ref 0.5–1.3)
CREAT SERPL-MCNC: 5.27 MG/DL — HIGH (ref 0.5–1.3)
CREAT SERPL-MCNC: 5.49 MG/DL — HIGH (ref 0.5–1.3)
EGFR: 10 ML/MIN/1.73M2 — LOW
EGFR: 12 ML/MIN/1.73M2 — LOW
EGFR: 12 ML/MIN/1.73M2 — LOW
EGFR: 16 ML/MIN/1.73M2 — LOW
EGFR: 16 ML/MIN/1.73M2 — LOW
EGFR: 87 ML/MIN/1.73M2 — SIGNIFICANT CHANGE UP
EGFR: 87 ML/MIN/1.73M2 — SIGNIFICANT CHANGE UP
EOSINOPHIL # BLD AUTO: 0 K/UL — SIGNIFICANT CHANGE UP (ref 0–0.5)
EOSINOPHIL # BLD AUTO: 0 K/UL — SIGNIFICANT CHANGE UP (ref 0–0.5)
EOSINOPHIL # BLD AUTO: 0.01 K/UL — SIGNIFICANT CHANGE UP (ref 0–0.5)
EOSINOPHIL # BLD AUTO: 0.05 K/UL — SIGNIFICANT CHANGE UP (ref 0–0.5)
EOSINOPHIL NFR BLD AUTO: 0 % — SIGNIFICANT CHANGE UP (ref 0–6)
EOSINOPHIL NFR BLD AUTO: 0 % — SIGNIFICANT CHANGE UP (ref 0–6)
EOSINOPHIL NFR BLD AUTO: 0.1 % — SIGNIFICANT CHANGE UP (ref 0–6)
EOSINOPHIL NFR BLD AUTO: 0.4 % — SIGNIFICANT CHANGE UP (ref 0–6)
GAS PNL BLDA: SIGNIFICANT CHANGE UP
GLUCOSE SERPL-MCNC: 127 MG/DL — HIGH (ref 70–99)
GLUCOSE SERPL-MCNC: 152 MG/DL — HIGH (ref 70–99)
GLUCOSE SERPL-MCNC: 166 MG/DL — HIGH (ref 70–99)
GLUCOSE SERPL-MCNC: 280 MG/DL — HIGH (ref 70–99)
GLUCOSE SERPL-MCNC: 89 MG/DL — SIGNIFICANT CHANGE UP (ref 70–99)
HCT VFR BLD CALC: 29.4 % — LOW (ref 39–50)
HCT VFR BLD CALC: 29.8 % — LOW (ref 39–50)
HCT VFR BLD CALC: 30 % — LOW (ref 39–50)
HCT VFR BLD CALC: 31.1 % — LOW (ref 39–50)
HGB BLD-MCNC: 10 G/DL — LOW (ref 13–17)
HGB BLD-MCNC: 10.2 G/DL — LOW (ref 13–17)
HGB BLD-MCNC: 9.5 G/DL — LOW (ref 13–17)
HGB BLD-MCNC: 9.7 G/DL — LOW (ref 13–17)
IMM GRANULOCYTES NFR BLD AUTO: 0.4 % — SIGNIFICANT CHANGE UP (ref 0–0.9)
IMM GRANULOCYTES NFR BLD AUTO: 0.6 % — SIGNIFICANT CHANGE UP (ref 0–0.9)
IMM GRANULOCYTES NFR BLD AUTO: 0.6 % — SIGNIFICANT CHANGE UP (ref 0–0.9)
INR BLD: 0.97 — SIGNIFICANT CHANGE UP (ref 0.85–1.16)
INR BLD: 1.02 — SIGNIFICANT CHANGE UP (ref 0.85–1.16)
INR BLD: 1.06 — SIGNIFICANT CHANGE UP (ref 0.85–1.16)
INR BLD: 1.17 — HIGH (ref 0.85–1.16)
LACTATE SERPL-SCNC: 1.8 MMOL/L — SIGNIFICANT CHANGE UP (ref 0.5–2)
LACTATE SERPL-SCNC: 2.2 MMOL/L — HIGH (ref 0.5–2)
LYMPHOCYTES # BLD AUTO: 0.66 K/UL — LOW (ref 1–3.3)
LYMPHOCYTES # BLD AUTO: 0.89 K/UL — LOW (ref 1–3.3)
LYMPHOCYTES # BLD AUTO: 1.3 K/UL — SIGNIFICANT CHANGE UP (ref 1–3.3)
LYMPHOCYTES # BLD AUTO: 11.7 % — LOW (ref 13–44)
LYMPHOCYTES # BLD AUTO: 13.5 % — SIGNIFICANT CHANGE UP (ref 13–44)
LYMPHOCYTES # BLD AUTO: 2.27 K/UL — SIGNIFICANT CHANGE UP (ref 1–3.3)
LYMPHOCYTES # BLD AUTO: 6.7 % — LOW (ref 13–44)
LYMPHOCYTES # BLD AUTO: 9.4 % — LOW (ref 13–44)
MAGNESIUM SERPL-MCNC: 2.1 MG/DL — SIGNIFICANT CHANGE UP (ref 1.6–2.6)
MAGNESIUM SERPL-MCNC: 2.3 MG/DL — SIGNIFICANT CHANGE UP (ref 1.6–2.6)
MAGNESIUM SERPL-MCNC: 2.3 MG/DL — SIGNIFICANT CHANGE UP (ref 1.6–2.6)
MAGNESIUM SERPL-MCNC: 2.4 MG/DL — SIGNIFICANT CHANGE UP (ref 1.6–2.6)
MCHC RBC-ENTMCNC: 30.2 PG — SIGNIFICANT CHANGE UP (ref 27–34)
MCHC RBC-ENTMCNC: 30.4 PG — SIGNIFICANT CHANGE UP (ref 27–34)
MCHC RBC-ENTMCNC: 30.4 PG — SIGNIFICANT CHANGE UP (ref 27–34)
MCHC RBC-ENTMCNC: 31.9 PG — SIGNIFICANT CHANGE UP (ref 27–34)
MCHC RBC-ENTMCNC: 32.2 G/DL — SIGNIFICANT CHANGE UP (ref 32–36)
MCHC RBC-ENTMCNC: 32.3 G/DL — SIGNIFICANT CHANGE UP (ref 32–36)
MCHC RBC-ENTMCNC: 32.3 G/DL — SIGNIFICANT CHANGE UP (ref 32–36)
MCHC RBC-ENTMCNC: 34.2 G/DL — SIGNIFICANT CHANGE UP (ref 32–36)
MCV RBC AUTO: 93.1 FL — SIGNIFICANT CHANGE UP (ref 80–100)
MCV RBC AUTO: 93.3 FL — SIGNIFICANT CHANGE UP (ref 80–100)
MCV RBC AUTO: 94 FL — SIGNIFICANT CHANGE UP (ref 80–100)
MCV RBC AUTO: 94.5 FL — SIGNIFICANT CHANGE UP (ref 80–100)
MONOCYTES # BLD AUTO: 0.55 K/UL — SIGNIFICANT CHANGE UP (ref 0–0.9)
MONOCYTES # BLD AUTO: 0.75 K/UL — SIGNIFICANT CHANGE UP (ref 0–0.9)
MONOCYTES # BLD AUTO: 0.86 K/UL — SIGNIFICANT CHANGE UP (ref 0–0.9)
MONOCYTES # BLD AUTO: 1.66 K/UL — HIGH (ref 0–0.9)
MONOCYTES NFR BLD AUTO: 5.6 % — SIGNIFICANT CHANGE UP (ref 2–14)
MONOCYTES NFR BLD AUTO: 6.7 % — SIGNIFICANT CHANGE UP (ref 2–14)
MONOCYTES NFR BLD AUTO: 9.1 % — SIGNIFICANT CHANGE UP (ref 2–14)
MONOCYTES NFR BLD AUTO: 9.9 % — SIGNIFICANT CHANGE UP (ref 2–14)
NEUTROPHILS # BLD AUTO: 12.72 K/UL — HIGH (ref 1.8–7.4)
NEUTROPHILS # BLD AUTO: 7.57 K/UL — HIGH (ref 1.8–7.4)
NEUTROPHILS # BLD AUTO: 8.62 K/UL — HIGH (ref 1.8–7.4)
NEUTROPHILS # BLD AUTO: 8.92 K/UL — HIGH (ref 1.8–7.4)
NEUTROPHILS NFR BLD AUTO: 74.8 % — SIGNIFICANT CHANGE UP (ref 43–77)
NEUTROPHILS NFR BLD AUTO: 80.3 % — HIGH (ref 43–77)
NEUTROPHILS NFR BLD AUTO: 80.5 % — HIGH (ref 43–77)
NEUTROPHILS NFR BLD AUTO: 87.1 % — HIGH (ref 43–77)
NRBC BLD AUTO-RTO: 0 /100 WBCS — SIGNIFICANT CHANGE UP (ref 0–0)
NRBC BLD AUTO-RTO: SIGNIFICANT CHANGE UP /100 WBCS (ref 0–0)
PHOSPHATE SERPL-MCNC: 2.9 MG/DL — SIGNIFICANT CHANGE UP (ref 2.5–4.5)
PHOSPHATE SERPL-MCNC: 3.8 MG/DL — SIGNIFICANT CHANGE UP (ref 2.5–4.5)
PHOSPHATE SERPL-MCNC: 3.9 MG/DL — SIGNIFICANT CHANGE UP (ref 2.5–4.5)
PHOSPHATE SERPL-MCNC: 4.1 MG/DL — SIGNIFICANT CHANGE UP (ref 2.5–4.5)
PLATELET # BLD AUTO: 114 K/UL — LOW (ref 150–400)
PLATELET # BLD AUTO: 133 K/UL — LOW (ref 150–400)
PLATELET # BLD AUTO: 166 K/UL — SIGNIFICANT CHANGE UP (ref 150–400)
PLATELET # BLD AUTO: 190 K/UL — SIGNIFICANT CHANGE UP (ref 150–400)
POTASSIUM SERPL-MCNC: 4.2 MMOL/L — SIGNIFICANT CHANGE UP (ref 3.5–5.3)
POTASSIUM SERPL-MCNC: 4.5 MMOL/L — SIGNIFICANT CHANGE UP (ref 3.5–5.3)
POTASSIUM SERPL-MCNC: 4.8 MMOL/L — SIGNIFICANT CHANGE UP (ref 3.5–5.3)
POTASSIUM SERPL-MCNC: 5 MMOL/L — SIGNIFICANT CHANGE UP (ref 3.5–5.3)
POTASSIUM SERPL-MCNC: 5.4 MMOL/L — HIGH (ref 3.5–5.3)
POTASSIUM SERPL-SCNC: 4.2 MMOL/L — SIGNIFICANT CHANGE UP (ref 3.5–5.3)
POTASSIUM SERPL-SCNC: 4.5 MMOL/L — SIGNIFICANT CHANGE UP (ref 3.5–5.3)
POTASSIUM SERPL-SCNC: 4.8 MMOL/L — SIGNIFICANT CHANGE UP (ref 3.5–5.3)
POTASSIUM SERPL-SCNC: 5 MMOL/L — SIGNIFICANT CHANGE UP (ref 3.5–5.3)
POTASSIUM SERPL-SCNC: 5.4 MMOL/L — HIGH (ref 3.5–5.3)
PROT SERPL-MCNC: 5.2 G/DL — LOW (ref 6–8.3)
PROT SERPL-MCNC: 5.4 G/DL — LOW (ref 6–8.3)
PROT SERPL-MCNC: 5.7 G/DL — LOW (ref 6–8.3)
PROT SERPL-MCNC: 6 G/DL — SIGNIFICANT CHANGE UP (ref 6–8.3)
PROT SERPL-MCNC: 6.2 G/DL — SIGNIFICANT CHANGE UP (ref 6–8.3)
PROTHROM AB SERPL-ACNC: 11.4 SEC — SIGNIFICANT CHANGE UP (ref 9.9–13.4)
PROTHROM AB SERPL-ACNC: 11.7 SEC — SIGNIFICANT CHANGE UP (ref 9.9–13.4)
PROTHROM AB SERPL-ACNC: 12.2 SEC — SIGNIFICANT CHANGE UP (ref 9.9–13.4)
PROTHROM AB SERPL-ACNC: 13.4 SEC — SIGNIFICANT CHANGE UP (ref 9.9–13.4)
RBC # BLD: 3.15 M/UL — LOW (ref 4.2–5.8)
RBC # BLD: 3.19 M/UL — LOW (ref 4.2–5.8)
RBC # BLD: 3.2 M/UL — LOW (ref 4.2–5.8)
RBC # BLD: 3.29 M/UL — LOW (ref 4.2–5.8)
RBC # FLD: 12.2 % — SIGNIFICANT CHANGE UP (ref 10.3–14.5)
RBC # FLD: 13.8 % — SIGNIFICANT CHANGE UP (ref 10.3–14.5)
RBC # FLD: 14.4 % — SIGNIFICANT CHANGE UP (ref 10.3–14.5)
RBC # FLD: 14.6 % — HIGH (ref 10.3–14.5)
RH IG SCN BLD-IMP: POSITIVE — SIGNIFICANT CHANGE UP
SODIUM SERPL-SCNC: 133 MMOL/L — LOW (ref 135–145)
SODIUM SERPL-SCNC: 134 MMOL/L — LOW (ref 135–145)
SODIUM SERPL-SCNC: 135 MMOL/L — SIGNIFICANT CHANGE UP (ref 135–145)
WBC # BLD: 11.12 K/UL — HIGH (ref 3.8–10.5)
WBC # BLD: 16.8 K/UL — HIGH (ref 3.8–10.5)
WBC # BLD: 9.42 K/UL — SIGNIFICANT CHANGE UP (ref 3.8–10.5)
WBC # BLD: 9.89 K/UL — SIGNIFICANT CHANGE UP (ref 3.8–10.5)
WBC # FLD AUTO: 11.12 K/UL — HIGH (ref 3.8–10.5)
WBC # FLD AUTO: 16.8 K/UL — HIGH (ref 3.8–10.5)
WBC # FLD AUTO: 9.42 K/UL — SIGNIFICANT CHANGE UP (ref 3.8–10.5)
WBC # FLD AUTO: 9.89 K/UL — SIGNIFICANT CHANGE UP (ref 3.8–10.5)

## 2025-02-27 PROCEDURE — 71045 X-RAY EXAM CHEST 1 VIEW: CPT | Mod: 26

## 2025-02-27 PROCEDURE — 99291 CRITICAL CARE FIRST HOUR: CPT

## 2025-02-27 PROCEDURE — 99232 SBSQ HOSP IP/OBS MODERATE 35: CPT

## 2025-02-27 PROCEDURE — 99292 CRITICAL CARE ADDL 30 MIN: CPT

## 2025-02-27 RX ORDER — GLUCAGON 3 MG/1
1 POWDER NASAL ONCE
Refills: 0 | Status: DISCONTINUED | OUTPATIENT
Start: 2025-02-27 | End: 2025-03-01

## 2025-02-27 RX ORDER — SODIUM CHLORIDE 9 G/1000ML
1000 INJECTION, SOLUTION INTRAVENOUS
Refills: 0 | Status: DISCONTINUED | OUTPATIENT
Start: 2025-02-27 | End: 2025-03-10

## 2025-02-27 RX ORDER — CALCIUM GLUCONATE 20 MG/ML
2 INJECTION, SOLUTION INTRAVENOUS ONCE
Refills: 0 | Status: COMPLETED | OUTPATIENT
Start: 2025-02-27 | End: 2025-02-27

## 2025-02-27 RX ORDER — ALBUMIN (HUMAN) 12.5 G/50ML
50 INJECTION, SOLUTION INTRAVENOUS ONCE
Refills: 0 | Status: COMPLETED | OUTPATIENT
Start: 2025-02-27 | End: 2025-02-27

## 2025-02-27 RX ORDER — INSULIN LISPRO 100 U/ML
INJECTION, SOLUTION INTRAVENOUS; SUBCUTANEOUS AT BEDTIME
Refills: 0 | Status: DISCONTINUED | OUTPATIENT
Start: 2025-02-27 | End: 2025-03-10

## 2025-02-27 RX ORDER — INSULIN LISPRO 100 U/ML
INJECTION, SOLUTION INTRAVENOUS; SUBCUTANEOUS
Refills: 0 | Status: DISCONTINUED | OUTPATIENT
Start: 2025-02-27 | End: 2025-03-10

## 2025-02-27 RX ORDER — INSULIN LISPRO 100 U/ML
2 INJECTION, SOLUTION INTRAVENOUS; SUBCUTANEOUS
Refills: 0 | Status: DISCONTINUED | OUTPATIENT
Start: 2025-02-27 | End: 2025-03-09

## 2025-02-27 RX ORDER — PHENYLEPHRINE HCL IN 0.9% NACL 0.5 MG/5ML
0.1 SYRINGE (ML) INTRAVENOUS
Qty: 40 | Refills: 0 | Status: DISCONTINUED | OUTPATIENT
Start: 2025-02-27 | End: 2025-03-02

## 2025-02-27 RX ORDER — IPRATROPIUM BROMIDE AND ALBUTEROL SULFATE .5; 2.5 MG/3ML; MG/3ML
3 SOLUTION RESPIRATORY (INHALATION) ONCE
Refills: 0 | Status: COMPLETED | OUTPATIENT
Start: 2025-02-27 | End: 2025-02-27

## 2025-02-27 RX ORDER — CALCIUM GLUCONATE 20 MG/ML
2 INJECTION, SOLUTION INTRAVENOUS ONCE
Refills: 0 | Status: DISCONTINUED | OUTPATIENT
Start: 2025-02-27 | End: 2025-02-27

## 2025-02-27 RX ORDER — DEXTROSE 50 % IN WATER 50 %
15 SYRINGE (ML) INTRAVENOUS ONCE
Refills: 0 | Status: DISCONTINUED | OUTPATIENT
Start: 2025-02-27 | End: 2025-03-10

## 2025-02-27 RX ORDER — INSULIN GLARGINE-YFGN 100 [IU]/ML
8 INJECTION, SOLUTION SUBCUTANEOUS AT BEDTIME
Refills: 0 | Status: DISCONTINUED | OUTPATIENT
Start: 2025-02-27 | End: 2025-03-09

## 2025-02-27 RX ORDER — NOREPINEPHRINE BITARTRATE 8 MG
0.07 SOLUTION INTRAVENOUS
Qty: 8 | Refills: 0 | Status: DISCONTINUED | OUTPATIENT
Start: 2025-02-27 | End: 2025-02-28

## 2025-02-27 RX ORDER — CEFAZOLIN SODIUM IN 0.9 % NACL 3 G/100 ML
1000 INTRAVENOUS SOLUTION, PIGGYBACK (ML) INTRAVENOUS EVERY 24 HOURS
Refills: 0 | Status: COMPLETED | OUTPATIENT
Start: 2025-02-28 | End: 2025-03-03

## 2025-02-27 RX ORDER — MIDODRINE HYDROCHLORIDE 5 MG/1
10 TABLET ORAL EVERY 8 HOURS
Refills: 0 | Status: DISCONTINUED | OUTPATIENT
Start: 2025-02-27 | End: 2025-03-03

## 2025-02-27 RX ORDER — OXYCODONE HYDROCHLORIDE 30 MG/1
5 TABLET ORAL EVERY 6 HOURS
Refills: 0 | Status: DISCONTINUED | OUTPATIENT
Start: 2025-02-27 | End: 2025-02-27

## 2025-02-27 RX ADMIN — INSULIN LISPRO 2: 100 INJECTION, SOLUTION INTRAVENOUS; SUBCUTANEOUS at 16:43

## 2025-02-27 RX ADMIN — Medication 40 MILLIGRAM(S): at 15:14

## 2025-02-27 RX ADMIN — Medication 1 MILLIGRAM(S): at 02:48

## 2025-02-27 RX ADMIN — POLYETHYLENE GLYCOL 3350 17 GRAM(S): 17 POWDER, FOR SOLUTION ORAL at 15:14

## 2025-02-27 RX ADMIN — CALCIUM GLUCONATE 200 GRAM(S): 20 INJECTION, SOLUTION INTRAVENOUS at 00:25

## 2025-02-27 RX ADMIN — MUPIROCIN CALCIUM 1 APPLICATION(S): 20 CREAM TOPICAL at 17:56

## 2025-02-27 RX ADMIN — Medication 81 MILLIGRAM(S): at 15:14

## 2025-02-27 RX ADMIN — Medication 3.09 MICROGRAM(S)/KG/MIN: at 15:13

## 2025-02-27 RX ADMIN — Medication 100 MILLIGRAM(S): at 05:52

## 2025-02-27 RX ADMIN — Medication 1000 MILLIGRAM(S): at 06:00

## 2025-02-27 RX ADMIN — Medication 1 APPLICATION(S): at 05:53

## 2025-02-27 RX ADMIN — MUPIROCIN CALCIUM 1 APPLICATION(S): 20 CREAM TOPICAL at 05:52

## 2025-02-27 RX ADMIN — IPRATROPIUM BROMIDE AND ALBUTEROL SULFATE 3 MILLILITER(S): .5; 2.5 SOLUTION RESPIRATORY (INHALATION) at 22:03

## 2025-02-27 RX ADMIN — OXYCODONE HYDROCHLORIDE 5 MILLIGRAM(S): 30 TABLET ORAL at 16:52

## 2025-02-27 RX ADMIN — SEVELAMER HYDROCHLORIDE 800 MILLIGRAM(S): 800 TABLET ORAL at 05:52

## 2025-02-27 RX ADMIN — LEVETIRACETAM 500 MILLIGRAM(S): 10 INJECTION, SOLUTION INTRAVENOUS at 17:56

## 2025-02-27 RX ADMIN — NOREPINEPHRINE BITARTRATE 10.8 MICROGRAM(S)/KG/MIN: 8 SOLUTION at 15:25

## 2025-02-27 RX ADMIN — GABAPENTIN 100 MILLIGRAM(S): 400 CAPSULE ORAL at 05:52

## 2025-02-27 RX ADMIN — Medication 400 MILLIGRAM(S): at 05:36

## 2025-02-27 RX ADMIN — HEPARIN SODIUM 5000 UNIT(S): 1000 INJECTION INTRAVENOUS; SUBCUTANEOUS at 05:52

## 2025-02-27 RX ADMIN — OXYCODONE HYDROCHLORIDE 5 MILLIGRAM(S): 30 TABLET ORAL at 09:00

## 2025-02-27 RX ADMIN — OXYCODONE HYDROCHLORIDE 5 MILLIGRAM(S): 30 TABLET ORAL at 08:48

## 2025-02-27 RX ADMIN — HEPARIN SODIUM 5000 UNIT(S): 1000 INJECTION INTRAVENOUS; SUBCUTANEOUS at 22:03

## 2025-02-27 RX ADMIN — Medication 1 MILLIGRAM(S): at 03:05

## 2025-02-27 RX ADMIN — Medication 500 MILLIGRAM(S): at 17:56

## 2025-02-27 RX ADMIN — LEVETIRACETAM 500 MILLIGRAM(S): 10 INJECTION, SOLUTION INTRAVENOUS at 05:52

## 2025-02-27 RX ADMIN — IPRATROPIUM BROMIDE AND ALBUTEROL SULFATE 3 MILLILITER(S): .5; 2.5 SOLUTION RESPIRATORY (INHALATION) at 01:48

## 2025-02-27 RX ADMIN — Medication 667 MILLIGRAM(S): at 16:43

## 2025-02-27 RX ADMIN — INSULIN LISPRO 2 UNIT(S): 100 INJECTION, SOLUTION INTRAVENOUS; SUBCUTANEOUS at 11:18

## 2025-02-27 RX ADMIN — HEPARIN SODIUM 5000 UNIT(S): 1000 INJECTION INTRAVENOUS; SUBCUTANEOUS at 15:14

## 2025-02-27 RX ADMIN — INSULIN GLARGINE-YFGN 8 UNIT(S): 100 INJECTION, SOLUTION SUBCUTANEOUS at 22:04

## 2025-02-27 RX ADMIN — Medication 1 MILLIGRAM(S): at 01:48

## 2025-02-27 RX ADMIN — Medication 1 MILLIGRAM(S): at 04:00

## 2025-02-27 RX ADMIN — MIDODRINE HYDROCHLORIDE 10 MILLIGRAM(S): 5 TABLET ORAL at 15:25

## 2025-02-27 RX ADMIN — CLOPIDOGREL BISULFATE 75 MILLIGRAM(S): 75 TABLET, FILM COATED ORAL at 15:14

## 2025-02-27 RX ADMIN — Medication 500 MILLIGRAM(S): at 05:52

## 2025-02-27 RX ADMIN — GABAPENTIN 100 MILLIGRAM(S): 400 CAPSULE ORAL at 15:14

## 2025-02-27 RX ADMIN — OXYCODONE HYDROCHLORIDE 5 MILLIGRAM(S): 30 TABLET ORAL at 15:13

## 2025-02-27 RX ADMIN — Medication 1000 MILLIGRAM(S): at 16:51

## 2025-02-27 RX ADMIN — Medication 400 MILLIGRAM(S): at 15:25

## 2025-02-27 RX ADMIN — Medication 1000 MILLIGRAM(S): at 00:00

## 2025-02-27 RX ADMIN — ALBUMIN (HUMAN) 50 MILLILITER(S): 12.5 INJECTION, SOLUTION INTRAVENOUS at 17:55

## 2025-02-27 NOTE — PROGRESS NOTE ADULT - ASSESSMENT
84-year-old M with PMH of ESRD on HD TTS transferred to [facility] for CABG (2/24).  CABG performed on 2/26.  Nephrology following for ESRD management and postoperative volume management.    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    Telly Ramirez MD  PGY-4 Nephrology Fellow

## 2025-02-27 NOTE — PHYSICAL THERAPY INITIAL EVALUATION ADULT - MANUAL MUSCLE TESTING RESULTS, REHAB EVAL
All UE/LE ROM grossly >3+/5 via functional assessment besides b/l UE shoulder flexion 3/5 secondary to sternal precautions

## 2025-02-27 NOTE — PROGRESS NOTE ADULT - ASSESSMENT
84M w/ESRD on HD TTS, coronary artery disease s/p PCI, T2DM, HTN, BPH presented to [facility] with angina an ruled in fo NSTEMI. He underwent cardiac cath which showed 3vCAD.  He was started on heparin drip and transferred to [facility] for CABG eval.  S/p CABG x 2 (LIMA-LAD, SVG-OM2) EF NL  2/26  2PCs intraop  A/p  Short dialysis treatment post op for borderline high K level clearance with gentle UF  Extubated overnight to HFNC-- will continue for now given poor IS for PPV   Optimizing pain regimen   Still vasoplegic though weaning pressors down/ off vaso- starting midodrine to come off levophed   H&H in good range-- drains dry / to deescalate after ambulation   Anasarcic -- HD today/ back on schedule TTS- might need additional UF session for volume management / will assess on a daily basis   Sevelamer dc'd as phos levels running low   Transitioning insulin gtt to basal bolus and SSC - continue carb restriction   DAPT/ statin and ICU ppx  OOB and mobilizing as tolerated     ATTENDING: I have personally and independently provided 105 min of critical care services. This excludes any time spent on separate procedures or teaching.

## 2025-02-27 NOTE — PHYSICAL THERAPY INITIAL EVALUATION ADULT - IMPAIRMENTS CONTRIBUTING TO GAIT DEVIATIONS, PT EVAL
c/o 10/10 incision pain/impaired balance/impaired postural control/decreased strength c/o 10/10 incision pain & back pain/impaired balance/pain/impaired postural control/decreased strength

## 2025-02-27 NOTE — PHYSICAL THERAPY INITIAL EVALUATION ADULT - AMBULATION SKILLS, REHAB EVAL
Onset: Sunday tested positive covid - patient requesting paxlovid medication  Location / description: scratchy throat from clearing his throat  Precipitating Factors: Tested positive covid  Pain Scale (1-10), 10 highest: 0/10  Associated Symptoms: A lot of phlegm he can't clear. Fever around 100 now it stabilized it's 98 - taken with a scanner, no appetite  What improves / worsens symptoms: Patient states he a touch better  Symptom specific medications: Dayquil  Recent visits (last 3-4 weeks) for same reason or recent surgery: No visit, but Dr Christy sent an   Patient sent an E-Advice message to Dr Christy regarding taking paxlovid, Dr Christy responded to patient and informed patient paxlovid may not help. He informed patient to take over the counter medication.    PLAN:   See/Call Provider within 24 hours    Patient/Caller agrees to follow recommendations.    Communication between patient and doctor has been done.    Reason for Disposition  • [1] HIGH RISK patient AND [2] influenza exposure within the last 7 days AND [3] ONE OR MORE respiratory symptoms: cough, sore throat, runny or stuffy nose    Additional Information  • Negative: Oxygen level (e.g., pulse oximetry) 90 percent or lower     Patient checked his oxygen level an it was 96.    Protocols used: CORONAVIRUS (COVID-19) DIAGNOSED OR [id number]       independent/needs device

## 2025-02-27 NOTE — PHYSICAL THERAPY INITIAL EVALUATION ADULT - PERTINENT HX OF CURRENT PROBLEM, REHAB EVAL
83 yo M with ESRD on HD TTS, coronary artery disease s/p PCI, Type 2 diabetes, hypertension, hyperlipidemia, benign prostate hypertrophy, bladder diverticulum, and chronic hip pain who presented to [facility] complaining of right sided chest pain.  Patient was found to have NSTEMI.  He underwent a cardiac cath which showed 3v CAD. He was started on heparin drip and transferred to [facility].

## 2025-02-27 NOTE — PHYSICAL THERAPY INITIAL EVALUATION ADULT - GAIT DEVIATIONS NOTED, PT EVAL
unsteady gait, frequently flexing forward requiring max cueing to maintain upright posture/decreased alex/decreased velocity of limb motion/decreased step length/decreased weight-shifting ability unsteady gait, frequently flexing forward requiring max cueing to maintain upright posture, requires frequent seated rest breaks/decreased alex/decreased velocity of limb motion/decreased step length/decreased weight-shifting ability

## 2025-02-27 NOTE — PHYSICAL THERAPY INITIAL EVALUATION ADULT - GENERAL OBSERVATIONS, REHAB EVAL
Pt received in bedside chair in no acute distress +IV +EKG +HFNC 50% (nonrebreather for amb) +rolo +central line +1 latrice to wall suction +3 chest tubes to wall suction +prevena +temporary pacemaker +sternal incision dressing C/D/I

## 2025-02-27 NOTE — PROGRESS NOTE ADULT - SUBJECTIVE AND OBJECTIVE BOX
NEPHROLOGY PROGRESS NOTE:    Interval history:  Patient seen and examined on HD.      Vitals:  T(F): 97 (25 @ 13:21), Max: 97.8 (25 @ 11:15)  HR: 82 (25 @ 13:00)  BP: --  RR: 14 (25 @ 13:00)  SpO2: 95% (25 @ 13:00)  Wt(kg): --     @ 07:01  -   @ 07:00  --------------------------------------------------------  IN: 1102 mL / OUT: 0 mL / NET: 1102 mL     @ 07:01  -   @ 07:00  --------------------------------------------------------  IN: 1034 mL / OUT: 955 mL / NET: 79 mL     @ 07:01  -   @ 14:18  --------------------------------------------------------  IN: 158.4 mL / OUT: 50 mL / NET: 108.4 mL          PE:  General: Agitated, in pain, well-nourished  Chest: Diminished breath due to poor inspiratory effort, no use of accessory respiratory muscles  Heart: RRR, S1/S2 wnl, no MRG  Abdomen: Soft, nontender, nondistended   Extremities: Dependent edema  Neuro:  Alert, no apparent focal deficits,   Access: RUE AVF with palpable thrill    Pertinent labs & Imagin-27    133[L]  |  93[L]  |  21  ----------------------------<  127[H]  5.0   |  24  |  4.71[H]    Ca    8.6      2025 11:27  Phos  3.8       Mg     2.4         TPro  6.0  /  Alb  3.3  /  TBili  0.4  /  DBili      /  AST  38  /  ALT  6[L]  /  AlkPhos  118                            9.5    11.12 )-----------( 166      ( 2025 11:27 )             29.4       Urine Studies:  Urinalysis Basic - ( 2025 11:27 )    Color:  / Appearance:  / SG:  / pH:   Gluc: 127 mg/dL / Ketone:   / Bili:  / Urobili:    Blood:  / Protein:  / Nitrite:    Leuk Esterase:  / RBC:  / WBC    Sq Epi:  / Non Sq Epi:  / Bacteria:             MEDICATIONS  (STANDING):  acetaminophen     Tablet .. 650 milliGRAM(s) Oral every 6 hours  acetaminophen   IVPB .. 1000 milliGRAM(s) IV Intermittent every 6 hours  ascorbic acid 500 milliGRAM(s) Oral two times a day  aspirin enteric coated 81 milliGRAM(s) Oral daily  atorvastatin 40 milliGRAM(s) Oral at bedtime  calcium acetate 667 milliGRAM(s) Oral three times a day with meals  chlorhexidine 0.12% Liquid 15 milliLiter(s) Oral Mucosa every 12 hours  chlorhexidine 2% Cloths 1 Application(s) Topical <User Schedule>  clopidogrel Tablet 75 milliGRAM(s) Oral daily  dextrose 5%. 1000 milliLiter(s) (50 mL/Hr) IV Continuous <Continuous>  dextrose 5%. 1000 milliLiter(s) (100 mL/Hr) IV Continuous <Continuous>  dextrose 50% Injectable 50 milliLiter(s) IV Push every 15 minutes  dextrose 50% Injectable 25 milliLiter(s) IV Push every 15 minutes  gabapentin 100 milliGRAM(s) Oral every 8 hours  glucagon  Injectable 1 milliGRAM(s) IntraMuscular once  heparin   Injectable 5000 Unit(s) SubCutaneous every 8 hours  influenza  Vaccine (HIGH DOSE) 0.5 milliLiter(s) IntraMuscular once  insulin glargine Injectable (LANTUS) 8 Unit(s) SubCutaneous at bedtime  insulin lispro (ADMELOG) corrective regimen sliding scale   SubCutaneous three times a day before meals  insulin lispro (ADMELOG) corrective regimen sliding scale   SubCutaneous at bedtime  insulin lispro Injectable (ADMELOG) 2 Unit(s) SubCutaneous three times a day before meals  levETIRAcetam 500 milliGRAM(s) Oral two times a day  midodrine 10 milliGRAM(s) Oral every 8 hours  mupirocin 2% Ointment 1 Application(s) Both Nostrils two times a day  norepinephrine Infusion 0.07 MICROgram(s)/kG/Min (10.8 mL/Hr) IV Continuous <Continuous>  pantoprazole    Tablet 40 milliGRAM(s) Oral daily  polyethylene glycol 3350 17 Gram(s) Oral daily  senna 2 Tablet(s) Oral at bedtime  sodium chloride 0.9%. 1000 milliLiter(s) (10 mL/Hr) IV Continuous <Continuous>  tamsulosin 0.4 milliGRAM(s) Oral at bedtime

## 2025-02-27 NOTE — PROGRESS NOTE ADULT - SUBJECTIVE AND OBJECTIVE BOX
NEPHROLOGY PROGRESS NOTE:    The patient was re-evaluated during dialysis to ensure hemodynamic stability.  Patient is agitated and in pain.   Duration decreased to 180 minutes, 2L removed    Vitals:  T(F): 97 (02-27-25 @ 13:21), Max: 97.8 (02-27-25 @ 11:15)  HR: 81 (02-27-25 @ 14:00)  BP: --  RR: 15 (02-27-25 @ 14:00)  SpO2: 92% (02-27-25 @ 14:00)  Wt(kg): --    02-25 @ 07:01  -  02-26 @ 07:00  --------------------------------------------------------  IN: 1102 mL / OUT: 0 mL / NET: 1102 mL    02-26 @ 07:01  -  02-27 @ 07:00  --------------------------------------------------------  IN: 1034 mL / OUT: 955 mL / NET: 79 mL    02-27 @ 07:01  -  02-27 @ 14:30  --------------------------------------------------------  IN: 180.8 mL / OUT: 50 mL / NET: 130.8 mL

## 2025-02-27 NOTE — PROGRESS NOTE ADULT - SUBJECTIVE AND OBJECTIVE BOX
INTERVAL COURSE  POD#1 CABGx 2 normal EF    Short HD tx post op for hyperkalemia - am K 4.5   Extubated overnight, Off vaso, still on levo, maintaining MAPs ~ 70  mmhg  OOB on HFNC/ complaining of incisional pain   On exam lungs with basilar crackles , anasarcic     VITALS  Vital Signs Last 24 Hrs  T(C): 36.6 (27 Feb 2025 11:15), Max: 36.6 (27 Feb 2025 11:15)  T(F): 97.8 (27 Feb 2025 11:15), Max: 97.8 (27 Feb 2025 11:15)  HR: 77 (27 Feb 2025 12:00) (62 - 79)  BP: 120/48  BP(an): 65   RR: 18 (27 Feb 2025 12:00) (12 - 18)  SpO2: 97% (27 Feb 2025 12:00) (90% - 100%)    Parameters below as of 27 Feb 2025 12:00  Patient On (Oxygen Delivery Method): nasal cannula w/ humidification  O2 Flow (L/min): 50  O2 Concentration (%): 50    I&O's Summary    26 Feb 2025 07:01  -  27 Feb 2025 07:00  --------------------------------------------------------  IN: 1034 mL / OUT: 955 mL / NET: 79 mL    27 Feb 2025 07:01  -  27 Feb 2025 12:04  --------------------------------------------------------  IN: 136 mL / OUT: 50 mL / NET: 86 mL      PHYSICAL EXAM  General: A&Ox 3; in mild distress for pain   Respiratory: Poor inspiratory effort, no wheezing   Cardiovascular: Regular rhythm/rate  Gastrointestinal: Soft; NTND   Extremities: WWP; Anasarcic   Neurological:  CNII-XII grossly intact; no obvious focal deficits    LABS/NG/EKG/ETC  Reviewed.

## 2025-02-28 LAB
ALBUMIN SERPL ELPH-MCNC: 3.2 G/DL — LOW (ref 3.3–5)
ALBUMIN SERPL ELPH-MCNC: 3.3 G/DL — SIGNIFICANT CHANGE UP (ref 3.3–5)
ALBUMIN SERPL ELPH-MCNC: 3.4 G/DL — SIGNIFICANT CHANGE UP (ref 3.3–5)
ALP SERPL-CCNC: 104 U/L — SIGNIFICANT CHANGE UP (ref 40–120)
ALP SERPL-CCNC: 109 U/L — SIGNIFICANT CHANGE UP (ref 40–120)
ALP SERPL-CCNC: 98 U/L — SIGNIFICANT CHANGE UP (ref 40–120)
ALT FLD-CCNC: <5 U/L — LOW (ref 10–45)
AMMONIA BLD-MCNC: 19 UMOL/L — SIGNIFICANT CHANGE UP (ref 11–55)
ANION GAP SERPL CALC-SCNC: 11 MMOL/L — SIGNIFICANT CHANGE UP (ref 5–17)
ANION GAP SERPL CALC-SCNC: 15 MMOL/L — SIGNIFICANT CHANGE UP (ref 5–17)
ANION GAP SERPL CALC-SCNC: 16 MMOL/L — SIGNIFICANT CHANGE UP (ref 5–17)
APTT BLD: 30.1 SEC — SIGNIFICANT CHANGE UP (ref 24.5–35.6)
APTT BLD: 30.7 SEC — SIGNIFICANT CHANGE UP (ref 24.5–35.6)
APTT BLD: 32 SEC — SIGNIFICANT CHANGE UP (ref 24.5–35.6)
AST SERPL-CCNC: 29 U/L — SIGNIFICANT CHANGE UP (ref 10–40)
AST SERPL-CCNC: 32 U/L — SIGNIFICANT CHANGE UP (ref 10–40)
AST SERPL-CCNC: 34 U/L — SIGNIFICANT CHANGE UP (ref 10–40)
BASOPHILS # BLD AUTO: 0.02 K/UL — SIGNIFICANT CHANGE UP (ref 0–0.2)
BASOPHILS # BLD AUTO: 0.03 K/UL — SIGNIFICANT CHANGE UP (ref 0–0.2)
BASOPHILS # BLD AUTO: 0.04 K/UL — SIGNIFICANT CHANGE UP (ref 0–0.2)
BASOPHILS NFR BLD AUTO: 0.2 % — SIGNIFICANT CHANGE UP (ref 0–2)
BASOPHILS NFR BLD AUTO: 0.3 % — SIGNIFICANT CHANGE UP (ref 0–2)
BASOPHILS NFR BLD AUTO: 0.5 % — SIGNIFICANT CHANGE UP (ref 0–2)
BILIRUB SERPL-MCNC: 0.4 MG/DL — SIGNIFICANT CHANGE UP (ref 0.2–1.2)
BILIRUB SERPL-MCNC: 0.4 MG/DL — SIGNIFICANT CHANGE UP (ref 0.2–1.2)
BILIRUB SERPL-MCNC: 0.5 MG/DL — SIGNIFICANT CHANGE UP (ref 0.2–1.2)
BUN SERPL-MCNC: 13 MG/DL — SIGNIFICANT CHANGE UP (ref 7–23)
BUN SERPL-MCNC: 21 MG/DL — SIGNIFICANT CHANGE UP (ref 7–23)
BUN SERPL-MCNC: 25 MG/DL — HIGH (ref 7–23)
CALCIUM SERPL-MCNC: 8.4 MG/DL — SIGNIFICANT CHANGE UP (ref 8.4–10.5)
CALCIUM SERPL-MCNC: 8.6 MG/DL — SIGNIFICANT CHANGE UP (ref 8.4–10.5)
CALCIUM SERPL-MCNC: 8.6 MG/DL — SIGNIFICANT CHANGE UP (ref 8.4–10.5)
CHLORIDE SERPL-SCNC: 92 MMOL/L — LOW (ref 96–108)
CHLORIDE SERPL-SCNC: 96 MMOL/L — SIGNIFICANT CHANGE UP (ref 96–108)
CHLORIDE SERPL-SCNC: 99 MMOL/L — SIGNIFICANT CHANGE UP (ref 96–108)
CO2 SERPL-SCNC: 24 MMOL/L — SIGNIFICANT CHANGE UP (ref 22–31)
CO2 SERPL-SCNC: 25 MMOL/L — SIGNIFICANT CHANGE UP (ref 22–31)
CO2 SERPL-SCNC: 26 MMOL/L — SIGNIFICANT CHANGE UP (ref 22–31)
CREAT SERPL-MCNC: 2.93 MG/DL — HIGH (ref 0.5–1.3)
CREAT SERPL-MCNC: 4.55 MG/DL — HIGH (ref 0.5–1.3)
CREAT SERPL-MCNC: 5.14 MG/DL — HIGH (ref 0.5–1.3)
EGFR: 11 ML/MIN/1.73M2 — LOW
EGFR: 11 ML/MIN/1.73M2 — LOW
EGFR: 12 ML/MIN/1.73M2 — LOW
EGFR: 12 ML/MIN/1.73M2 — LOW
EGFR: 21 ML/MIN/1.73M2 — LOW
EGFR: 21 ML/MIN/1.73M2 — LOW
EOSINOPHIL # BLD AUTO: 0.02 K/UL — SIGNIFICANT CHANGE UP (ref 0–0.5)
EOSINOPHIL # BLD AUTO: 0.08 K/UL — SIGNIFICANT CHANGE UP (ref 0–0.5)
EOSINOPHIL # BLD AUTO: 0.13 K/UL — SIGNIFICANT CHANGE UP (ref 0–0.5)
EOSINOPHIL NFR BLD AUTO: 0.2 % — SIGNIFICANT CHANGE UP (ref 0–6)
EOSINOPHIL NFR BLD AUTO: 0.8 % — SIGNIFICANT CHANGE UP (ref 0–6)
EOSINOPHIL NFR BLD AUTO: 1.5 % — SIGNIFICANT CHANGE UP (ref 0–6)
GAS PNL BLDA: SIGNIFICANT CHANGE UP
GLUCOSE SERPL-MCNC: 145 MG/DL — HIGH (ref 70–99)
GLUCOSE SERPL-MCNC: 165 MG/DL — HIGH (ref 70–99)
GLUCOSE SERPL-MCNC: 171 MG/DL — HIGH (ref 70–99)
HCT VFR BLD CALC: 25.6 % — LOW (ref 39–50)
HCT VFR BLD CALC: 25.8 % — LOW (ref 39–50)
HCT VFR BLD CALC: 25.8 % — LOW (ref 39–50)
HGB BLD-MCNC: 8.1 G/DL — LOW (ref 13–17)
HGB BLD-MCNC: 8.1 G/DL — LOW (ref 13–17)
HGB BLD-MCNC: 8.2 G/DL — LOW (ref 13–17)
IMM GRANULOCYTES NFR BLD AUTO: 0.6 % — SIGNIFICANT CHANGE UP (ref 0–0.9)
INR BLD: 1.08 — SIGNIFICANT CHANGE UP (ref 0.85–1.16)
INR BLD: 1.09 — SIGNIFICANT CHANGE UP (ref 0.85–1.16)
INR BLD: 1.11 — SIGNIFICANT CHANGE UP (ref 0.85–1.16)
LACTATE SERPL-SCNC: 1.2 MMOL/L — SIGNIFICANT CHANGE UP (ref 0.5–2)
LYMPHOCYTES # BLD AUTO: 1.52 K/UL — SIGNIFICANT CHANGE UP (ref 1–3.3)
LYMPHOCYTES # BLD AUTO: 1.61 K/UL — SIGNIFICANT CHANGE UP (ref 1–3.3)
LYMPHOCYTES # BLD AUTO: 1.7 K/UL — SIGNIFICANT CHANGE UP (ref 1–3.3)
LYMPHOCYTES # BLD AUTO: 16.1 % — SIGNIFICANT CHANGE UP (ref 13–44)
LYMPHOCYTES # BLD AUTO: 18 % — SIGNIFICANT CHANGE UP (ref 13–44)
LYMPHOCYTES # BLD AUTO: 19.1 % — SIGNIFICANT CHANGE UP (ref 13–44)
MAGNESIUM SERPL-MCNC: 2 MG/DL — SIGNIFICANT CHANGE UP (ref 1.6–2.6)
MAGNESIUM SERPL-MCNC: 2.2 MG/DL — SIGNIFICANT CHANGE UP (ref 1.6–2.6)
MAGNESIUM SERPL-MCNC: 2.4 MG/DL — SIGNIFICANT CHANGE UP (ref 1.6–2.6)
MCHC RBC-ENTMCNC: 30.2 PG — SIGNIFICANT CHANGE UP (ref 27–34)
MCHC RBC-ENTMCNC: 30.7 PG — SIGNIFICANT CHANGE UP (ref 27–34)
MCHC RBC-ENTMCNC: 30.8 PG — SIGNIFICANT CHANGE UP (ref 27–34)
MCHC RBC-ENTMCNC: 31.4 G/DL — LOW (ref 32–36)
MCHC RBC-ENTMCNC: 31.6 G/DL — LOW (ref 32–36)
MCHC RBC-ENTMCNC: 31.8 G/DL — LOW (ref 32–36)
MCV RBC AUTO: 96.3 FL — SIGNIFICANT CHANGE UP (ref 80–100)
MCV RBC AUTO: 97 FL — SIGNIFICANT CHANGE UP (ref 80–100)
MCV RBC AUTO: 97 FL — SIGNIFICANT CHANGE UP (ref 80–100)
MONOCYTES # BLD AUTO: 1.09 K/UL — HIGH (ref 0–0.9)
MONOCYTES # BLD AUTO: 1.16 K/UL — HIGH (ref 0–0.9)
MONOCYTES # BLD AUTO: 1.31 K/UL — HIGH (ref 0–0.9)
MONOCYTES NFR BLD AUTO: 12.3 % — SIGNIFICANT CHANGE UP (ref 2–14)
MONOCYTES NFR BLD AUTO: 12.9 % — SIGNIFICANT CHANGE UP (ref 2–14)
MONOCYTES NFR BLD AUTO: 13.9 % — SIGNIFICANT CHANGE UP (ref 2–14)
NEUTROPHILS # BLD AUTO: 5.51 K/UL — SIGNIFICANT CHANGE UP (ref 1.8–7.4)
NEUTROPHILS # BLD AUTO: 6.42 K/UL — SIGNIFICANT CHANGE UP (ref 1.8–7.4)
NEUTROPHILS # BLD AUTO: 6.46 K/UL — SIGNIFICANT CHANGE UP (ref 1.8–7.4)
NEUTROPHILS NFR BLD AUTO: 65.4 % — SIGNIFICANT CHANGE UP (ref 43–77)
NEUTROPHILS NFR BLD AUTO: 68.3 % — SIGNIFICANT CHANGE UP (ref 43–77)
NEUTROPHILS NFR BLD AUTO: 68.7 % — SIGNIFICANT CHANGE UP (ref 43–77)
NRBC BLD AUTO-RTO: 0 /100 WBCS — SIGNIFICANT CHANGE UP (ref 0–0)
PHOSPHATE SERPL-MCNC: 2.7 MG/DL — SIGNIFICANT CHANGE UP (ref 2.5–4.5)
PHOSPHATE SERPL-MCNC: 4.7 MG/DL — HIGH (ref 2.5–4.5)
PHOSPHATE SERPL-MCNC: 5.1 MG/DL — HIGH (ref 2.5–4.5)
PLATELET # BLD AUTO: 134 K/UL — LOW (ref 150–400)
PLATELET # BLD AUTO: 141 K/UL — LOW (ref 150–400)
PLATELET # BLD AUTO: 143 K/UL — LOW (ref 150–400)
POTASSIUM SERPL-MCNC: 3.9 MMOL/L — SIGNIFICANT CHANGE UP (ref 3.5–5.3)
POTASSIUM SERPL-MCNC: 4.6 MMOL/L — SIGNIFICANT CHANGE UP (ref 3.5–5.3)
POTASSIUM SERPL-MCNC: 4.6 MMOL/L — SIGNIFICANT CHANGE UP (ref 3.5–5.3)
POTASSIUM SERPL-SCNC: 3.9 MMOL/L — SIGNIFICANT CHANGE UP (ref 3.5–5.3)
POTASSIUM SERPL-SCNC: 4.6 MMOL/L — SIGNIFICANT CHANGE UP (ref 3.5–5.3)
POTASSIUM SERPL-SCNC: 4.6 MMOL/L — SIGNIFICANT CHANGE UP (ref 3.5–5.3)
PROT SERPL-MCNC: 5.7 G/DL — LOW (ref 6–8.3)
PROT SERPL-MCNC: 6 G/DL — SIGNIFICANT CHANGE UP (ref 6–8.3)
PROT SERPL-MCNC: 6.1 G/DL — SIGNIFICANT CHANGE UP (ref 6–8.3)
PROTHROM AB SERPL-ACNC: 12.5 SEC — SIGNIFICANT CHANGE UP (ref 9.9–13.4)
PROTHROM AB SERPL-ACNC: 12.6 SEC — SIGNIFICANT CHANGE UP (ref 9.9–13.4)
PROTHROM AB SERPL-ACNC: 13 SEC — SIGNIFICANT CHANGE UP (ref 9.9–13.4)
RBC # BLD: 2.64 M/UL — LOW (ref 4.2–5.8)
RBC # BLD: 2.66 M/UL — LOW (ref 4.2–5.8)
RBC # BLD: 2.68 M/UL — LOW (ref 4.2–5.8)
RBC # FLD: 15.1 % — HIGH (ref 10.3–14.5)
RBC # FLD: 15.3 % — HIGH (ref 10.3–14.5)
RBC # FLD: 15.3 % — HIGH (ref 10.3–14.5)
SODIUM SERPL-SCNC: 133 MMOL/L — LOW (ref 135–145)
SODIUM SERPL-SCNC: 135 MMOL/L — SIGNIFICANT CHANGE UP (ref 135–145)
SODIUM SERPL-SCNC: 136 MMOL/L — SIGNIFICANT CHANGE UP (ref 135–145)
WBC # BLD: 8.43 K/UL — SIGNIFICANT CHANGE UP (ref 3.8–10.5)
WBC # BLD: 9.42 K/UL — SIGNIFICANT CHANGE UP (ref 3.8–10.5)
WBC # BLD: 9.42 K/UL — SIGNIFICANT CHANGE UP (ref 3.8–10.5)
WBC # FLD AUTO: 8.43 K/UL — SIGNIFICANT CHANGE UP (ref 3.8–10.5)
WBC # FLD AUTO: 9.42 K/UL — SIGNIFICANT CHANGE UP (ref 3.8–10.5)
WBC # FLD AUTO: 9.42 K/UL — SIGNIFICANT CHANGE UP (ref 3.8–10.5)

## 2025-02-28 PROCEDURE — 71045 X-RAY EXAM CHEST 1 VIEW: CPT | Mod: 26

## 2025-02-28 PROCEDURE — 99291 CRITICAL CARE FIRST HOUR: CPT

## 2025-02-28 PROCEDURE — 99292 CRITICAL CARE ADDL 30 MIN: CPT

## 2025-02-28 PROCEDURE — 99232 SBSQ HOSP IP/OBS MODERATE 35: CPT

## 2025-02-28 RX ORDER — KETOROLAC TROMETHAMINE 30 MG/ML
30 INJECTION, SOLUTION INTRAMUSCULAR; INTRAVENOUS ONCE
Refills: 0 | Status: DISCONTINUED | OUTPATIENT
Start: 2025-02-28 | End: 2025-02-28

## 2025-02-28 RX ORDER — LEVETIRACETAM 10 MG/ML
500 INJECTION, SOLUTION INTRAVENOUS EVERY 24 HOURS
Refills: 0 | Status: DISCONTINUED | OUTPATIENT
Start: 2025-02-28 | End: 2025-03-01

## 2025-02-28 RX ORDER — CALCIUM GLUCONATE 20 MG/ML
2 INJECTION, SOLUTION INTRAVENOUS ONCE
Refills: 0 | Status: COMPLETED | OUTPATIENT
Start: 2025-02-28 | End: 2025-02-28

## 2025-02-28 RX ORDER — ACETAMINOPHEN 500 MG/5ML
1000 LIQUID (ML) ORAL ONCE
Refills: 0 | Status: COMPLETED | OUTPATIENT
Start: 2025-02-28 | End: 2025-02-28

## 2025-02-28 RX ORDER — DEXMEDETOMIDINE HYDROCHLORIDE IN SODIUM CHLORIDE 4 UG/ML
0.1 INJECTION INTRAVENOUS
Qty: 400 | Refills: 0 | Status: DISCONTINUED | OUTPATIENT
Start: 2025-02-28 | End: 2025-03-01

## 2025-02-28 RX ORDER — EPOETIN ALFA 10000 [IU]/ML
8000 SOLUTION INTRAVENOUS; SUBCUTANEOUS ONCE
Refills: 0 | Status: COMPLETED | OUTPATIENT
Start: 2025-02-28 | End: 2025-02-28

## 2025-02-28 RX ORDER — OLANZAPINE 10 MG/1
2.5 TABLET ORAL ONCE
Refills: 0 | Status: COMPLETED | OUTPATIENT
Start: 2025-02-28 | End: 2025-02-28

## 2025-02-28 RX ADMIN — MUPIROCIN CALCIUM 1 APPLICATION(S): 20 CREAM TOPICAL at 18:26

## 2025-02-28 RX ADMIN — INSULIN LISPRO 2: 100 INJECTION, SOLUTION INTRAVENOUS; SUBCUTANEOUS at 11:51

## 2025-02-28 RX ADMIN — Medication 667 MILLIGRAM(S): at 11:50

## 2025-02-28 RX ADMIN — HEPARIN SODIUM 5000 UNIT(S): 1000 INJECTION INTRAVENOUS; SUBCUTANEOUS at 21:44

## 2025-02-28 RX ADMIN — POLYETHYLENE GLYCOL 3350 17 GRAM(S): 17 POWDER, FOR SOLUTION ORAL at 11:31

## 2025-02-28 RX ADMIN — ATORVASTATIN CALCIUM 40 MILLIGRAM(S): 80 TABLET, FILM COATED ORAL at 21:44

## 2025-02-28 RX ADMIN — HEPARIN SODIUM 5000 UNIT(S): 1000 INJECTION INTRAVENOUS; SUBCUTANEOUS at 13:26

## 2025-02-28 RX ADMIN — DEXMEDETOMIDINE HYDROCHLORIDE IN SODIUM CHLORIDE 2.06 MICROGRAM(S)/KG/HR: 4 INJECTION INTRAVENOUS at 23:06

## 2025-02-28 RX ADMIN — INSULIN LISPRO 2: 100 INJECTION, SOLUTION INTRAVENOUS; SUBCUTANEOUS at 07:55

## 2025-02-28 RX ADMIN — Medication 650 MILLIGRAM(S): at 18:26

## 2025-02-28 RX ADMIN — HEPARIN SODIUM 5000 UNIT(S): 1000 INJECTION INTRAVENOUS; SUBCUTANEOUS at 06:33

## 2025-02-28 RX ADMIN — CLOPIDOGREL BISULFATE 75 MILLIGRAM(S): 75 TABLET, FILM COATED ORAL at 11:32

## 2025-02-28 RX ADMIN — Medication 15 MILLILITER(S): at 18:27

## 2025-02-28 RX ADMIN — KETOROLAC TROMETHAMINE 30 MILLIGRAM(S): 30 INJECTION, SOLUTION INTRAMUSCULAR; INTRAVENOUS at 16:13

## 2025-02-28 RX ADMIN — LEVETIRACETAM 500 MILLIGRAM(S): 10 INJECTION, SOLUTION INTRAVENOUS at 11:32

## 2025-02-28 RX ADMIN — Medication 2 TABLET(S): at 21:44

## 2025-02-28 RX ADMIN — MIDODRINE HYDROCHLORIDE 10 MILLIGRAM(S): 5 TABLET ORAL at 21:42

## 2025-02-28 RX ADMIN — Medication 81 MILLIGRAM(S): at 11:33

## 2025-02-28 RX ADMIN — Medication 400 MILLIGRAM(S): at 03:32

## 2025-02-28 RX ADMIN — KETOROLAC TROMETHAMINE 30 MILLIGRAM(S): 30 INJECTION, SOLUTION INTRAMUSCULAR; INTRAVENOUS at 14:11

## 2025-02-28 RX ADMIN — Medication 10 MILLILITER(S): at 19:49

## 2025-02-28 RX ADMIN — Medication 1 APPLICATION(S): at 07:06

## 2025-02-28 RX ADMIN — MIDODRINE HYDROCHLORIDE 10 MILLIGRAM(S): 5 TABLET ORAL at 13:26

## 2025-02-28 RX ADMIN — EPOETIN ALFA 8000 UNIT(S): 10000 SOLUTION INTRAVENOUS; SUBCUTANEOUS at 13:20

## 2025-02-28 RX ADMIN — MUPIROCIN CALCIUM 1 APPLICATION(S): 20 CREAM TOPICAL at 06:33

## 2025-02-28 RX ADMIN — OLANZAPINE 2.5 MILLIGRAM(S): 10 TABLET ORAL at 22:18

## 2025-02-28 RX ADMIN — INSULIN LISPRO 2 UNIT(S): 100 INJECTION, SOLUTION INTRAVENOUS; SUBCUTANEOUS at 16:32

## 2025-02-28 RX ADMIN — CALCIUM GLUCONATE 200 GRAM(S): 20 INJECTION, SOLUTION INTRAVENOUS at 21:44

## 2025-02-28 RX ADMIN — Medication 667 MILLIGRAM(S): at 18:26

## 2025-02-28 RX ADMIN — Medication 3.09 MICROGRAM(S)/KG/MIN: at 09:48

## 2025-02-28 RX ADMIN — Medication 500 MILLIGRAM(S): at 18:26

## 2025-02-28 RX ADMIN — GABAPENTIN 100 MILLIGRAM(S): 400 CAPSULE ORAL at 21:44

## 2025-02-28 RX ADMIN — Medication 650 MILLIGRAM(S): at 19:35

## 2025-02-28 RX ADMIN — GABAPENTIN 100 MILLIGRAM(S): 400 CAPSULE ORAL at 13:26

## 2025-02-28 RX ADMIN — INSULIN GLARGINE-YFGN 8 UNIT(S): 100 INJECTION, SOLUTION SUBCUTANEOUS at 22:29

## 2025-02-28 RX ADMIN — Medication 650 MILLIGRAM(S): at 11:33

## 2025-02-28 RX ADMIN — TAMSULOSIN HYDROCHLORIDE 0.4 MILLIGRAM(S): 0.4 CAPSULE ORAL at 21:44

## 2025-02-28 RX ADMIN — Medication 100 MILLIGRAM(S): at 06:32

## 2025-02-28 RX ADMIN — Medication 3.09 MICROGRAM(S)/KG/MIN: at 13:25

## 2025-02-28 RX ADMIN — Medication 40 MILLIGRAM(S): at 11:33

## 2025-02-28 RX ADMIN — Medication 1000 MILLIGRAM(S): at 04:00

## 2025-02-28 RX ADMIN — Medication 3.09 MICROGRAM(S)/KG/MIN: at 18:26

## 2025-02-28 RX ADMIN — Medication 650 MILLIGRAM(S): at 12:24

## 2025-02-28 RX ADMIN — INSULIN LISPRO 2 UNIT(S): 100 INJECTION, SOLUTION INTRAVENOUS; SUBCUTANEOUS at 11:50

## 2025-02-28 NOTE — DIETITIAN INITIAL EVALUATION ADULT - NSFNSGIIOFT_GEN_A_CORE
02-27-25 @ 07:01  -  02-28-25 @ 07:00  --------------------------------------------------------  OUT:    Chest Tube (mL): 30 mL    Chest Tube (mL): 50 mL    Chest Tube (mL): 60 mL  Total OUT: 140 mL    Total NET: -140 mL      02-28-25 @ 07:01  -  02-28-25 @ 12:10  --------------------------------------------------------  OUT:    Chest Tube (mL): 0 mL    Chest Tube (mL): 0 mL  Total OUT: 0 mL    Total NET: 0 mL

## 2025-02-28 NOTE — DIETITIAN INITIAL EVALUATION ADULT - PERTINENT LABORATORY DATA
02-28    133[L]  |  92[L]  |  25[H]  ----------------------------<  165[H]  4.6   |  25  |  5.14[H]    Ca    8.6      28 Feb 2025 09:20  Phos  4.7     02-28  Mg     2.4     02-28    TPro  6.1  /  Alb  3.2[L]  /  TBili  0.4  /  DBili  x   /  AST  32  /  ALT  <5[L]  /  AlkPhos  109  02-28  POCT Blood Glucose.: 164 mg/dL (02-28-25 @ 11:35)  A1C with Estimated Average Glucose Result: 7.7 % (02-24-25 @ 03:13)

## 2025-02-28 NOTE — OCCUPATIONAL THERAPY INITIAL EVALUATION ADULT - PLANNED THERAPY INTERVENTIONS, OT EVAL
ADL retraining/IADL retraining/balance training/bed mobility training/cognitive, visual perceptual/parent/caregiver training.../strengthening/stretching/transfer training

## 2025-02-28 NOTE — DIETITIAN INITIAL EVALUATION ADULT - OTHER CALCULATIONS
, %  IBW for EER as %IBW>100% in ICU setting. Needs adjusted for advanced age, post-op healing, ESRD on HD. Fluids per team in view of HD.

## 2025-02-28 NOTE — PROGRESS NOTE ADULT - SUBJECTIVE AND OBJECTIVE BOX
CTICU  CRITICAL  CARE  attending     Hand off received 					   Pertinent clinical, laboratory, radiographic, hemodynamic, echocardiographic, respiratory data, microbiologic data and chart were reviewed and analyzed frequently throughout the course of the day and night  Patient seen and examined with CTS/ SH attending at bedside  Pt is a 84y , Male, HEALTH ISSUES - PROBLEM Dx:      , FAMILY HISTORY:  Known health problems: none (Father, Mother)    PAST MEDICAL & SURGICAL HISTORY:  HTN (hypertension)      Gout      DM (diabetes mellitus)      History of BPH      HLD (hyperlipidemia)      CAD (coronary artery disease)  one stent 6 yrs      End stage renal disease      History of cholecystectomy      S/P arteriovenous (AV) fistula creation  1 yr ago        Patient is a 84y old  Male who presents with a chief complaint of NSTEMI     (28 Feb 2025 12:10)      14 system review was unremarkable  acute changes include acute respiratory failure  Vital signs, hemodynamic and respiratory parameters were reviewed from the bedside nursing flowsheet.  ICU Vital Signs Last 24 Hrs  T(C): 36.7 (28 Feb 2025 21:48), Max: 36.9 (28 Feb 2025 05:37)  T(F): 98 (28 Feb 2025 21:48), Max: 98.4 (28 Feb 2025 05:37)  HR: 65 (28 Feb 2025 21:03) (64 - 79)  BP: --  BP(mean): --  ABP: 164/43 (28 Feb 2025 21:00) (126/43 - 189/46)  ABP(mean): 70 (28 Feb 2025 21:00) (61 - 79)  RR: 18 (28 Feb 2025 21:03) (16 - 24)  SpO2: 97% (28 Feb 2025 21:03) (92% - 100%)    O2 Parameters below as of 28 Feb 2025 21:03  Patient On (Oxygen Delivery Method): nasal cannula, high flow  O2 Flow (L/min): 50  O2 Concentration (%): 50      Adult Advanced Hemodynamics Last 24 Hrs  CVP(mm Hg): 16 (28 Feb 2025 21:00) (13 - 63)  CVP(cm H2O): --  CO: --  CI: --  PA: --  PA(mean): --  PCWP: --  SVR: --  SVRI: --  PVR: --  PVRI: --, ABG - ( 28 Feb 2025 18:30 )  pH, Arterial: 7.45  pH, Blood: x     /  pCO2: 41    /  pO2: 98    / HCO3: 28    / Base Excess: 4.1   /  SaO2: N/A                 Intake and output was reviewed and the fluid balance was calculated  Daily     Daily   I&O's Summary    27 Feb 2025 07:01  -  28 Feb 2025 07:00  --------------------------------------------------------  IN: 621.1 mL / OUT: 2140 mL / NET: -1518.9 mL    28 Feb 2025 07:01  -  28 Feb 2025 23:04  --------------------------------------------------------  IN: 1321.6 mL / OUT: 3330 mL / NET: -2008.4 mL        All lines and drain sites were assessed  [facility]      POCT Blood Glucose.: 136 mg/dL (28 Feb 2025 22:27)    No acute change in mental status  Auscultation of the chest reveals equal bs  Abdomen is soft  Extremities are warm and well perfused  Wounds appear clean and unremarkable  Antibiotics are periop    labs  CBC Full  -  ( 28 Feb 2025 18:21 )  WBC Count : 8.43 K/uL  RBC Count : 2.64 M/uL  Hemoglobin : 8.1 g/dL  Hematocrit : 25.6 %  Platelet Count - Automated : 143 K/uL  Mean Cell Volume : 97.0 fl  Mean Cell Hemoglobin : 30.7 pg  Mean Cell Hemoglobin Concentration : 31.6 g/dL  Auto Neutrophil # : x  Auto Lymphocyte # : x  Auto Monocyte # : x  Auto Eosinophil # : x  Auto Basophil # : x  Auto Neutrophil % : x  Auto Lymphocyte % : x  Auto Monocyte % : x  Auto Eosinophil % : x  Auto Basophil % : x    02-28    136  |  99  |  13  ----------------------------<  145[H]  3.9   |  26  |  2.93[H]    Ca    8.6      28 Feb 2025 18:21  Phos  2.7     02-28  Mg     2.0     02-28    TPro  6.0  /  Alb  3.4  /  TBili  0.5  /  DBili  x   /  AST  29  /  ALT  <5[L]  /  AlkPhos  104  02-28    PT/INR - ( 28 Feb 2025 18:21 )   PT: 13.0 sec;   INR: 1.11          PTT - ( 28 Feb 2025 18:21 )  PTT:30.7 sec  The current medications were reviewed   MEDICATIONS  (STANDING):  acetaminophen     Tablet .. 650 milliGRAM(s) Oral every 6 hours  ascorbic acid 500 milliGRAM(s) Oral two times a day  aspirin enteric coated 81 milliGRAM(s) Oral daily  atorvastatin 40 milliGRAM(s) Oral at bedtime  bisacodyl Suppository 10 milliGRAM(s) Rectal once  calcium acetate 667 milliGRAM(s) Oral three times a day with meals  ceFAZolin   IVPB 1000 milliGRAM(s) IV Intermittent every 24 hours  chlorhexidine 2% Cloths 1 Application(s) Topical <User Schedule>  clopidogrel Tablet 75 milliGRAM(s) Oral daily  dexMEDEtomidine Infusion 0.1 MICROgram(s)/kG/Hr (2.06 mL/Hr) IV Continuous <Continuous>  dextrose 5%. 1000 milliLiter(s) (50 mL/Hr) IV Continuous <Continuous>  dextrose 5%. 1000 milliLiter(s) (100 mL/Hr) IV Continuous <Continuous>  dextrose 50% Injectable 50 milliLiter(s) IV Push every 15 minutes  dextrose 50% Injectable 25 milliLiter(s) IV Push every 15 minutes  gabapentin 100 milliGRAM(s) Oral every 8 hours  glucagon  Injectable 1 milliGRAM(s) IntraMuscular once  heparin   Injectable 5000 Unit(s) SubCutaneous every 8 hours  influenza  Vaccine (HIGH DOSE) 0.5 milliLiter(s) IntraMuscular once  insulin glargine Injectable (LANTUS) 8 Unit(s) SubCutaneous at bedtime  insulin lispro (ADMELOG) corrective regimen sliding scale   SubCutaneous three times a day before meals  insulin lispro (ADMELOG) corrective regimen sliding scale   SubCutaneous at bedtime  insulin lispro Injectable (ADMELOG) 2 Unit(s) SubCutaneous three times a day before meals  levETIRAcetam 500 milliGRAM(s) Oral every 24 hours  midodrine 10 milliGRAM(s) Oral every 8 hours  mupirocin 2% Ointment 1 Application(s) Both Nostrils two times a day  pantoprazole    Tablet 40 milliGRAM(s) Oral daily  phenylephrine    Infusion 0.1 MICROgram(s)/kG/Min (3.09 mL/Hr) IV Continuous <Continuous>  polyethylene glycol 3350 17 Gram(s) Oral daily  senna 2 Tablet(s) Oral at bedtime  sodium chloride 0.9%. 1000 milliLiter(s) (10 mL/Hr) IV Continuous <Continuous>  tamsulosin 0.4 milliGRAM(s) Oral at bedtime    MEDICATIONS  (PRN):  dextrose Oral Gel 15 Gram(s) Oral once PRN Blood Glucose LESS THAN 70 milliGRAM(s)/deciliter       PROBLEM LIST/ ASSESSMENT:  HEALTH ISSUES - PROBLEM Dx:      ,   Patient is a 84y old  Male who presents with a chief complaint of NSTEMI     (28 Feb 2025 12:10)     s/p   acute changes include acute respiratory failure    My plan includes :  close hemodynamic, ventilatory and drain monitoring and management per post op routine    Monitor for arrhythmias and monitor parameters for organ perfusion  monitor neurologic status  Head of the bed should remain elevated to 45 deg .   chest PT and IS will be encouraged  monitor adequacy of oxygenation and ventilation and attempt to wean oxygen  Nutritional goals will be met using po eventually , ensure adequate caloric intake and montior the same  Stress ulcer and VTE prophylaxis will be achieved    Glycemic control is satisfactory  Electrolytes have been repleted as necessary and wound care has been carried out. Pain control has been achieved.   agressive physical therapy and early mobility and ambulation goals will be met   The family was updated about the course and plan  CRITICAL CARE TIME SPENT in evaluation and management, reassessments, review and interpretation of labs and x-rays, ventilator and hemodynamic management, formulating a plan and coordinating care: ___30____ MIN.  Time does not include procedural time.  CTICU ATTENDING     					    Shay Rojas MD                        	 CTICU  CRITICAL  CARE  attending     Hand off received 					   Pertinent clinical, laboratory, radiographic, hemodynamic, echocardiographic, respiratory data, microbiologic data and chart were reviewed and analyzed frequently throughout the course of the day and night  Patient seen and examined with CTS/ SH attending at bedside      Pt is a 84y , Male, POD # 2 s/p CABG x 2;    post op:    remains extubated  hypotensive on low dose pressor support  concern for waxing/waning mentation;    currently:     pressor support with phenylephrine  acute post hemorrhagic anemia  s/p iHD today with 2.5L removed  post operaive hypoxemia on HFNC  1: 1 sitter for safety  dexmedetomidine for safety/encephalopathy; ( pt has been pulling on his central venous line etc; mittens on bilaterally ; high risk for self harm)    HPI:    83 yo M with ESRD on HD TTS, coronary artery disease s/p PCI, Type 2 diabetes, hypertension, hyperlipidemia, benign prostate hypertrophy, bladder diverticulum, and chronic hip pain who presented to [facility] complaining of right sided chest pain.  Patient was found to have NSTEMI.  He underwent a cardiac cath which showed 3v CAD.  He was started on heparin drip and transferred to [facility].      , FAMILY HISTORY:  Known health problems: none (Father, Mother)    PAST MEDICAL & SURGICAL HISTORY:  HTN (hypertension)      Gout      DM (diabetes mellitus)      History of BPH      HLD (hyperlipidemia)      CAD (coronary artery disease)  one stent 6 yrs      End stage renal disease      History of cholecystectomy      S/P arteriovenous (AV) fistula creation  1 yr ago        Patient is a 84y old  Male who presents with a chief complaint of NSTEMI     (28 Feb 2025 12:10)      14 system review limited 2/2 encephalopathy/language barrier/discomfort from surgery    Vital signs, hemodynamic and respiratory parameters were reviewed from the bedside nursing flowsheet.  ICU Vital Signs Last 24 Hrs  T(C): 36.7 (28 Feb 2025 21:48), Max: 36.9 (28 Feb 2025 05:37)  T(F): 98 (28 Feb 2025 21:48), Max: 98.4 (28 Feb 2025 05:37)  HR: 65 (28 Feb 2025 21:03) (64 - 79)  BP: --  BP(mean): --  ABP: 164/43 (28 Feb 2025 21:00) (126/43 - 189/46)  ABP(mean): 70 (28 Feb 2025 21:00) (61 - 79)  RR: 18 (28 Feb 2025 21:03) (16 - 24)  SpO2: 97% (28 Feb 2025 21:03) (92% - 100%)    O2 Parameters below as of 28 Feb 2025 21:03  Patient On (Oxygen Delivery Method): nasal cannula, high flow  O2 Flow (L/min): 50  O2 Concentration (%): 50      Adult Advanced Hemodynamics Last 24 Hrs  CVP(mm Hg): 16 (28 Feb 2025 21:00) (13 - 63)  CVP(cm H2O): --  CO: --  CI: --  PA: --  PA(mean): --  PCWP: --  SVR: --  SVRI: --  PVR: --  PVRI: --, ABG - ( 28 Feb 2025 18:30 )  pH, Arterial: 7.45  pH, Blood: x     /  pCO2: 41    /  pO2: 98    / HCO3: 28    / Base Excess: 4.1   /  SaO2: N/A                 Intake and output was reviewed and the fluid balance was calculated  Daily     Daily   I&O's Summary    27 Feb 2025 07:01  -  28 Feb 2025 07:00  --------------------------------------------------------  IN: 621.1 mL / OUT: 2140 mL / NET: -1518.9 mL    28 Feb 2025 07:01  -  28 Feb 2025 23:04  --------------------------------------------------------  IN: 1321.6 mL / OUT: 3330 mL / NET: -2008.4 mL        All lines and drain sites were assessed  Glycemic trend was reviewedCAPILLARY BLOOD GLUCOSE      POCT Blood Glucose.: 136 mg/dL (28 Feb 2025 22:27)    No acute change in mental status  Auscultation of the chest reveals equal bs  Abdomen is soft  Extremities are warm and well perfused  Wounds appear clean and unremarkable  Antibiotics are periop    labs  CBC Full  -  ( 28 Feb 2025 18:21 )  WBC Count : 8.43 K/uL  RBC Count : 2.64 M/uL  Hemoglobin : 8.1 g/dL  Hematocrit : 25.6 %  Platelet Count - Automated : 143 K/uL  Mean Cell Volume : 97.0 fl  Mean Cell Hemoglobin : 30.7 pg  Mean Cell Hemoglobin Concentration : 31.6 g/dL  Auto Neutrophil # : x  Auto Lymphocyte # : x  Auto Monocyte # : x  Auto Eosinophil # : x  Auto Basophil # : x  Auto Neutrophil % : x  Auto Lymphocyte % : x  Auto Monocyte % : x  Auto Eosinophil % : x  Auto Basophil % : x    02-28    136  |  99  |  13  ----------------------------<  145[H]  3.9   |  26  |  2.93[H]    Ca    8.6      28 Feb 2025 18:21  Phos  2.7     02-28  Mg     2.0     02-28    TPro  6.0  /  Alb  3.4  /  TBili  0.5  /  DBili  x   /  AST  29  /  ALT  <5[L]  /  AlkPhos  104  02-28    PT/INR - ( 28 Feb 2025 18:21 )   PT: 13.0 sec;   INR: 1.11          PTT - ( 28 Feb 2025 18:21 )  PTT:30.7 sec  The current medications were reviewed   MEDICATIONS  (STANDING):  acetaminophen     Tablet .. 650 milliGRAM(s) Oral every 6 hours  ascorbic acid 500 milliGRAM(s) Oral two times a day  aspirin enteric coated 81 milliGRAM(s) Oral daily  atorvastatin 40 milliGRAM(s) Oral at bedtime  bisacodyl Suppository 10 milliGRAM(s) Rectal once  calcium acetate 667 milliGRAM(s) Oral three times a day with meals  ceFAZolin   IVPB 1000 milliGRAM(s) IV Intermittent every 24 hours  chlorhexidine 2% Cloths 1 Application(s) Topical <User Schedule>  clopidogrel Tablet 75 milliGRAM(s) Oral daily  dexMEDEtomidine Infusion 0.1 MICROgram(s)/kG/Hr (2.06 mL/Hr) IV Continuous <Continuous>  dextrose 5%. 1000 milliLiter(s) (50 mL/Hr) IV Continuous <Continuous>  dextrose 5%. 1000 milliLiter(s) (100 mL/Hr) IV Continuous <Continuous>  dextrose 50% Injectable 50 milliLiter(s) IV Push every 15 minutes  dextrose 50% Injectable 25 milliLiter(s) IV Push every 15 minutes  gabapentin 100 milliGRAM(s) Oral every 8 hours  glucagon  Injectable 1 milliGRAM(s) IntraMuscular once  heparin   Injectable 5000 Unit(s) SubCutaneous every 8 hours  influenza  Vaccine (HIGH DOSE) 0.5 milliLiter(s) IntraMuscular once  insulin glargine Injectable (LANTUS) 8 Unit(s) SubCutaneous at bedtime  insulin lispro (ADMELOG) corrective regimen sliding scale   SubCutaneous three times a day before meals  insulin lispro (ADMELOG) corrective regimen sliding scale   SubCutaneous at bedtime  insulin lispro Injectable (ADMELOG) 2 Unit(s) SubCutaneous three times a day before meals  levETIRAcetam 500 milliGRAM(s) Oral every 24 hours  midodrine 10 milliGRAM(s) Oral every 8 hours  mupirocin 2% Ointment 1 Application(s) Both Nostrils two times a day  pantoprazole    Tablet 40 milliGRAM(s) Oral daily  phenylephrine    Infusion 0.1 MICROgram(s)/kG/Min (3.09 mL/Hr) IV Continuous <Continuous>  polyethylene glycol 3350 17 Gram(s) Oral daily  senna 2 Tablet(s) Oral at bedtime  sodium chloride 0.9%. 1000 milliLiter(s) (10 mL/Hr) IV Continuous <Continuous>  tamsulosin 0.4 milliGRAM(s) Oral at bedtime    MEDICATIONS  (PRN):  dextrose Oral Gel 15 Gram(s) Oral once PRN Blood Glucose LESS THAN 70 milliGRAM(s)/deciliter       PROBLEM LIST/ ASSESSMENT:  HEALTH ISSUES - PROBLEM Dx:    ,   Patient is a 84y old  Male who presents with a chief complaint of NSTEMI     (28 Feb 2025 12:10)     s/p CABG x 2V      My plan includes :    Post op hypotension : Titrate pressor support to maintain MAP >75-80 ( age + features of probable cerebral hypoperfusion 2/2 micro vascular dz)    Post op hypoxemia: on supplemental O2 via HFNC; titrate Fio2 to maintain Sao2 >95%  Serial ABGs    Acute post hemorrhagic anemia: low H/H + pressor requirement; would consider transfusing PRBC with iHD    Encephalopathy: likely multi factorial; maintain higher MAP; avoid opiates; increase safety checks; 1:1 sitter for safety    Acute post thoracotomy pain: Toradol okay with acetaminophen for analgesia Avoid or limit narcotic use as much as possible    close hemodynamic, ventilatory and drain monitoring and management per post op routine    Monitor for arrhythmias and monitor parameters for organ perfusion  monitor neurologic status  Head of the bed should remain elevated to 45 deg .   chest PT and IS will be encouraged  monitor adequacy of oxygenation and ventilation and attempt to wean oxygen  Nutritional goals will be met using po eventually , ensure adequate caloric intake and montior the same  Stress ulcer and VTE prophylaxis will be achieved    Glycemic control is satisfactory  Electrolytes have been repleted as necessary and wound care has been carried out. Pain control has been achieved.   agressive physical therapy and early mobility and ambulation goals will be met   The family was updated about the course and plan  CRITICAL CARE TIME SPENT in evaluation and management, reassessments, review and interpretation of labs and x-rays, ventilator and hemodynamic management, formulating a plan and coordinating care: ___30____ MIN.  Time does not include procedural time.  CTICU ATTENDING     					    Shay Rojas MD

## 2025-02-28 NOTE — OCCUPATIONAL THERAPY INITIAL EVALUATION ADULT - ADDITIONAL COMMENTS
info received from medical chart 2/2 pt. alertness/command following- "pt. resides with his  wife and grandchildren in a private home with 3 steps to enter the house.  Pt. reports his grandchildren assist with some ADL's like dressing. Patient has CDPAP (grandson) for 4hr/7days. Patient uses a cane and shower chair for DME."

## 2025-02-28 NOTE — DIETITIAN INITIAL EVALUATION ADULT - PERTINENT MEDS FT
MEDICATIONS  (STANDING):  acetaminophen     Tablet .. 650 milliGRAM(s) Oral every 6 hours  ascorbic acid 500 milliGRAM(s) Oral two times a day  aspirin enteric coated 81 milliGRAM(s) Oral daily  atorvastatin 40 milliGRAM(s) Oral at bedtime  bisacodyl Suppository 10 milliGRAM(s) Rectal once  calcium acetate 667 milliGRAM(s) Oral three times a day with meals  ceFAZolin   IVPB 1000 milliGRAM(s) IV Intermittent every 24 hours  chlorhexidine 0.12% Liquid 15 milliLiter(s) Oral Mucosa every 12 hours  chlorhexidine 2% Cloths 1 Application(s) Topical <User Schedule>  clopidogrel Tablet 75 milliGRAM(s) Oral daily  dextrose 5%. 1000 milliLiter(s) (50 mL/Hr) IV Continuous <Continuous>  dextrose 5%. 1000 milliLiter(s) (100 mL/Hr) IV Continuous <Continuous>  dextrose 50% Injectable 50 milliLiter(s) IV Push every 15 minutes  dextrose 50% Injectable 25 milliLiter(s) IV Push every 15 minutes  gabapentin 100 milliGRAM(s) Oral every 8 hours  glucagon  Injectable 1 milliGRAM(s) IntraMuscular once  heparin   Injectable 5000 Unit(s) SubCutaneous every 8 hours  influenza  Vaccine (HIGH DOSE) 0.5 milliLiter(s) IntraMuscular once  insulin glargine Injectable (LANTUS) 8 Unit(s) SubCutaneous at bedtime  insulin lispro (ADMELOG) corrective regimen sliding scale   SubCutaneous three times a day before meals  insulin lispro (ADMELOG) corrective regimen sliding scale   SubCutaneous at bedtime  insulin lispro Injectable (ADMELOG) 2 Unit(s) SubCutaneous three times a day before meals  levETIRAcetam 500 milliGRAM(s) Oral every 24 hours  midodrine 10 milliGRAM(s) Oral every 8 hours  mupirocin 2% Ointment 1 Application(s) Both Nostrils two times a day  pantoprazole    Tablet 40 milliGRAM(s) Oral daily  phenylephrine    Infusion 0.1 MICROgram(s)/kG/Min (3.09 mL/Hr) IV Continuous <Continuous>  polyethylene glycol 3350 17 Gram(s) Oral daily  senna 2 Tablet(s) Oral at bedtime  sodium chloride 0.9%. 1000 milliLiter(s) (10 mL/Hr) IV Continuous <Continuous>  tamsulosin 0.4 milliGRAM(s) Oral at bedtime    MEDICATIONS  (PRN):  dextrose Oral Gel 15 Gram(s) Oral once PRN Blood Glucose LESS THAN 70 milliGRAM(s)/deciliter

## 2025-02-28 NOTE — OCCUPATIONAL THERAPY INITIAL EVALUATION ADULT - PERTINENT HX OF CURRENT PROBLEM, REHAB EVAL
85 yo M with ESRD on HD TTS, coronary artery disease s/p PCI, Type 2 diabetes, hypertension, hyperlipidemia, benign prostate hypertrophy, bladder diverticulum, and chronic hip pain who presented to [facility] [place] complaining of right sided chest pain.  Patient was found to have NSTEMI.  He underwent a cardiac cath which showed 3v CAD. He was started on heparin drip and transferred to [facility].

## 2025-02-28 NOTE — PROGRESS NOTE ADULT - ASSESSMENT
84M w/ESRD on HD TTS, coronary artery disease s/p PCI, T2DM, HTN, BPH presented to [facility] with angina an ruled in fo NSTEMI. He underwent cardiac cath which showed 3vCAD.  He was started on heparin drip and transferred to [facility] for CABG eval.  S/p CABG x 2 (LIMA-LAD, SVG-OM2)    EF NL  2/26  2PCs intraop  A/p  Lethargic in am arousable but not fully following commands, would avoid opiates and precedex  Continue kaitlin for MAP goal closer to 80 mmhg/ might need higher perfusion with longstanding hx of hypertension   H&H in good range-- drains dry/ to deescalate after ambulation  Anasarcic -- HD TTS / got dialyzed yesterday would need isolated UF session today for volume mgmt and weaning supplemental O2 given anasarca dn pulm congestion   Sugars well controlled on current insulin regimen   Home keppra resumed   DAPT/ statin and ICU ppx    Patient is hard to cooperate resisting to get OOB, take pills, etc...will update family/caregiver to engage more in his postop course and delirium mgmt     ATTENDING: I have personally and independently provided 105 min of critical care services. This excludes any time spent on separate procedures or teaching. 84M w/ESRD on HD TTS, coronary artery disease s/p PCI, T2DM, HTN, BPH presented to [facility] with angina an ruled in fo NSTEMI. He underwent cardiac cath which showed 3vCAD.  He was started on heparin drip and transferred to [facility] for CABG eval.  S/p CABG x 2 (LIMA-LAD, SVG-OM2)    EF NL  2/26  2PCs intraop  A/p  Lethargic in am arousable but not fully following commands, would avoid opiates and precedex  Continue kaitlin for MAP goal closer to 80 mmhg/ might need higher perfusion with longstanding hx of hypertension   H&H in good range-- drains dry/ to deescalate after ambulation  Anasarcic -- HD TTS / got dialyzed yesterday would need isolated UF session today for volume mgmt and weaning supplemental O2 given anasarca dn pulm congestion   Sugars well controlled on current insulin regimen   Home keppra resumed/ dose adjusted for HD   DAPT/ statin and ICU ppx    Patient is hard to cooperate resisting to get OOB, take pills, etc...will update family/caregiver to engage more in his postop course and delirium mgmt     ATTENDING: I have personally and independently provided 105 min of critical care services. This excludes any time spent on separate procedures or teaching.

## 2025-02-28 NOTE — DIETITIAN INITIAL EVALUATION ADULT - ADD RECOMMEND
1. As feasible, recommend Consistent Carbohydrate Renal diet. Textures per SLP.  >>If enteral nutrition indicated, please reconsult nutrition for recommendations.   2. Encourage and monitor PO intake, honor preferences as able.   >> Consistently meet >75% of estimated needs during admission.   >> Consider oral nutrition supplement or liberalizing diet should intake decline </= 50%.   3. Monitor wt trends, GI function, skin integrity.  4. Monitor lytes, renal indices, blood glucose, LFTs.    5. Pain and bowel regimen per team.  6. Align nutrition within goals of care.

## 2025-02-28 NOTE — DIETITIAN INITIAL EVALUATION ADULT - OTHER INFO
83 yo M with ESRD on HD TTS, coronary artery disease s/p PCI, Type 2 diabetes, hypertension, hyperlipidemia, benign prostate hypertrophy, bladder diverticulum, and chronic hip pain who presented to [facility] complaining of right sided chest pain. Patient was found to have NSTEMI. He underwent a cardiac cath which showed 3v CAD. He was started on heparin drip and transferred to [facility]. S/p CABG 2/26    Pt seen for nutrition assessment, however noted with confusion per RN. Subjective information obtained from RN, IDRs, and comprehensive chart review. Pt received lying in bed on high flow nasal cannula, MAP 76, /49, on phenylephrine gtt. Ordered for Consistent Carbohydrate, Renal, Low Sodium diet. No known food allergies documented. Per RN, pt noted to be holding pills in mouth this morning- plan to place formal SLP evaluation. RN also notes pt did not eat breakfast this morning as pt was very confused/not alert. Labs and medications reviewed. Na 133<L>, BUN/Cr 25/5.14<H>, Phos 4.7<H>, K 4.6<WNL>, eGFR 11<L>, glucose  x 24 hours, HgbA1c 7.7%<H>, <H>, Cholesterol 150<WNL>, non-<WNL>, LDL 89<WNL>, HDL 31<L>. Ordered for abx, insulin sliding scale, 2U ADMELOG TID, protonix, senna, vitamin C, phoslo. Skin: surgical incision MSI, R leg, +2 generalized edema noted. GI: abdomen rounded, soft/nontender; +BM 2/23 per flowsheets. Observed with no overt signs or symptoms of muscle or fat wasting. See nutrition recommendations below.

## 2025-02-28 NOTE — PROGRESS NOTE ADULT - SUBJECTIVE AND OBJECTIVE BOX
INTERVAL COURSE  POD#2  OPCAB x 2   Came off levo/ remained on low dose kaitlin  HD yesterday with 2 L UF   Labs unremarkable and stable   Still anasarcic but improving     VITALS  Vital Signs Last 24 Hrs  T(C): 36.9 (28 Feb 2025 05:37), Max: 36.9 (28 Feb 2025 05:37)  T(F): 98.4 (28 Feb 2025 05:37), Max: 98.4 (28 Feb 2025 05:37)  HR: 69 (28 Feb 2025 08:00) (62 - 84)  BP: 113/40  BP(mean): 58  RR: 20 (28 Feb 2025 07:43) (13 - 24)  SpO2: 94% (28 Feb 2025 08:00) (90% - 98%)    Parameters below as of 28 Feb 2025 08:00  Patient On (Oxygen Delivery Method): nasal cannula, high flow  O2 Flow (L/min): 50  O2 Concentration (%): 60    I&O's Summary  27 Feb 2025 07:01  -  28 Feb 2025 07:00  --------------------------------------------------------  IN: 621.1 mL / OUT: 2140 mL / NET: -1518.9 mL    PHYSICAL EXAM  General/Neuro: lethargic but arousable, not following commands though nonfocal   Respiratory: basilar crackles   Cardiovascular: Regular rhythm/rate  Gastrointestinal: Soft;   Extremities: WWP; anasarcic     LABS/IMAGING/EKG/ETC  Reviewed.

## 2025-02-28 NOTE — PROGRESS NOTE ADULT - ASSESSMENT
84-year-old M with PMH of ESRD on HD TTS transferred to [facility] for CABG (2/24).  CABG performed on 2/26.  Nephrology following for ESRD management and postoperative volume management.    1-ESRD on HD TTS s/p CABG:  Last HD (2/27), with 2L fluid removed to reach -1.5L net fluid balance over the last 24 hours  Patient is still on HFNC, with generalized edema and high CVP measurements   – UF today to help with volume optimization:  Duration: 180 minutes, target fluid removal: 2.5 L, dialysate electrolytes: Potassium 2, calcium 2.5, sodium 138, bicarbonate 35  –Strict I/O monitoring  –Access: RUE AVF    2-BP/Volume management:  Postoperative volume overload with MAP in the 60s on norepinephrine  Chest x-ray (2/27): Small left pleural effusion  –Target fluid removal with HD: 2.5 L    3-Anemia: Hgb 8.1   –RODRIGO with next HD    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    Telly Ramirez MD  PGY-4 Nephrology Fellow 84-year-old M with PMH of ESRD on HD TTS transferred to [facility] for CABG (2/24).  CABG performed on 2/26.  Nephrology following for ESRD management and postoperative volume management.    1-ESRD on HD TTS s/p CABG:  Last HD (2/27), with 2L fluid removed to reach -1.5L net fluid balance over the last 24 hours  Patient is still on HFNC, with generalized edema  – HD today to help with volume optimization:  Duration: 180 minutes, target fluid removal: 2.5 L, dialysate electrolytes: Potassium 2, calcium 2.5, sodium 138, bicarbonate 35  –Strict I/O monitoring  –Access: RUE AVF    2-BP/Volume management:  Postoperative volume overload with MAP in the 60s on norepinephrine  Chest x-ray (2/27): Small left pleural effusion  –Target fluid removal with HD: 2.5 L    3-Anemia: Hgb 8.1   –RODRIGO with next HD    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    Telly Ramirez MD  PGY-4 Nephrology Fellow

## 2025-02-28 NOTE — OCCUPATIONAL THERAPY INITIAL EVALUATION ADULT - GENERAL OBSERVATIONS, REHAB EVAL
OT IE complete. Pt. received semi-supine in bed w/ RNs Bridget, Carmen and Ivan, +HFNC 50L/60%, +central line, CTx2 to wall suction, +tele, +a-line, +TPM (w. extra batteries), +sternal incision C/D/I, slightly lethargic but in NAD, agreeable to session.

## 2025-02-28 NOTE — PROGRESS NOTE ADULT - SUBJECTIVE AND OBJECTIVE BOX
NEPHROLOGY PROGRESS NOTE:    Interval history:  Patient seen and examined at bedside.    Vitals:  T(F): 98.4 (25 @ 05:37), Max: 98.4 (25 @ 05:37)  HR: 69 (25 @ 08:00)  BP: --  RR: 20 (25 @ 07:43)  SpO2: 94% (25 @ 08:00)  Wt(kg): --     @ 07:01  -   @ 07:00  --------------------------------------------------------  IN: 1034 mL / OUT: 955 mL / NET: 79 mL     @ 07:01  -   @ 07:00  --------------------------------------------------------  IN: 621.1 mL / OUT: 2140 mL / NET: -1518.9 mL     @ 07:01  -   @ 08:24  --------------------------------------------------------  IN: 31.6 mL / OUT: 0 mL / NET: 31.6 mL        PE:  General: Agitated, in pain, well-nourished  Chest: Diminished breath due to poor inspiratory effort, no use of accessory respiratory muscles  Heart: RRR, S1/S2 wnl, no MRG  Abdomen: Soft, nontender, nondistended   Extremities: Dependent edema  Neuro:  Alert, no apparent focal deficits,   Access: RUE AVF with palpable thrill    Pertinent labs & Imagin-28    135  |  96  |  21  ----------------------------<  171[H]  4.6   |  24  |  4.55[H]    Ca    8.4      2025 03:00  Phos  5.1       Mg     2.2         TPro  5.7[L]  /  Alb  3.3  /  TBili  0.4  /  DBili      /  AST  34  /  ALT  <5[L]  /  AlkPhos  98                            8.1    9.42  )-----------( 134      ( 2025 03:00 )             25.8       Urine Studies:  Urinalysis Basic - ( 2025 03:00 )    Color:  / Appearance:  / SG:  / pH:   Gluc: 171 mg/dL / Ketone:   / Bili:  / Urobili:    Blood:  / Protein:  / Nitrite:    Leuk Esterase:  / RBC:  / WBC    Sq Epi:  / Non Sq Epi:  / Bacteria:             MEDICATIONS  (STANDING):  acetaminophen     Tablet .. 650 milliGRAM(s) Oral every 6 hours  ascorbic acid 500 milliGRAM(s) Oral two times a day  aspirin enteric coated 81 milliGRAM(s) Oral daily  atorvastatin 40 milliGRAM(s) Oral at bedtime  bisacodyl Suppository 10 milliGRAM(s) Rectal once  calcium acetate 667 milliGRAM(s) Oral three times a day with meals  ceFAZolin   IVPB 1000 milliGRAM(s) IV Intermittent every 24 hours  chlorhexidine 0.12% Liquid 15 milliLiter(s) Oral Mucosa every 12 hours  chlorhexidine 2% Cloths 1 Application(s) Topical <User Schedule>  clopidogrel Tablet 75 milliGRAM(s) Oral daily  dextrose 5%. 1000 milliLiter(s) (50 mL/Hr) IV Continuous <Continuous>  dextrose 5%. 1000 milliLiter(s) (100 mL/Hr) IV Continuous <Continuous>  dextrose 50% Injectable 50 milliLiter(s) IV Push every 15 minutes  dextrose 50% Injectable 25 milliLiter(s) IV Push every 15 minutes  gabapentin 100 milliGRAM(s) Oral every 8 hours  glucagon  Injectable 1 milliGRAM(s) IntraMuscular once  heparin   Injectable 5000 Unit(s) SubCutaneous every 8 hours  influenza  Vaccine (HIGH DOSE) 0.5 milliLiter(s) IntraMuscular once  insulin glargine Injectable (LANTUS) 8 Unit(s) SubCutaneous at bedtime  insulin lispro (ADMELOG) corrective regimen sliding scale   SubCutaneous three times a day before meals  insulin lispro (ADMELOG) corrective regimen sliding scale   SubCutaneous at bedtime  insulin lispro Injectable (ADMELOG) 2 Unit(s) SubCutaneous three times a day before meals  levETIRAcetam 500 milliGRAM(s) Oral two times a day  midodrine 10 milliGRAM(s) Oral every 8 hours  mupirocin 2% Ointment 1 Application(s) Both Nostrils two times a day  pantoprazole    Tablet 40 milliGRAM(s) Oral daily  phenylephrine    Infusion 0.1 MICROgram(s)/kG/Min (3.09 mL/Hr) IV Continuous <Continuous>  polyethylene glycol 3350 17 Gram(s) Oral daily  senna 2 Tablet(s) Oral at bedtime  sodium chloride 0.9%. 1000 milliLiter(s) (10 mL/Hr) IV Continuous <Continuous>  tamsulosin 0.4 milliGRAM(s) Oral at bedtime     NEPHROLOGY PROGRESS NOTE:    Interval history:  Patient seen and examined at bedside.    Vitals:  T(F): 98.4 (25 @ 05:37), Max: 98.4 (25 @ 05:37)  HR: 69 (25 @ 08:00)  BP: --  RR: 20 (25 @ 07:43)  SpO2: 94% (25 @ 08:00)  Wt(kg): --     @ 07:01  -   @ 07:00  --------------------------------------------------------  IN: 1034 mL / OUT: 955 mL / NET: 79 mL     @ 07:01  -   @ 07:00  --------------------------------------------------------  IN: 621.1 mL / OUT: 2140 mL / NET: -1518.9 mL     @ 07:01  -   @ 08:24  --------------------------------------------------------  IN: 31.6 mL / OUT: 0 mL / NET: 31.6 mL        PE:  General: In distress, in pain, well-nourished, on HFNC  Chest: Diminished breath due to poor inspiratory effort, no use of accessory respiratory muscles  Heart: RRR, S1/S2 wnl, no MRG  Abdomen: Soft, nontender, nondistended   Extremities: Dependent edema  Neuro:  Alert, no apparent focal deficits,   Access: RUE AVF with palpable thrill    Pertinent labs & Imagin-28    135  |  96  |  21  ----------------------------<  171[H]  4.6   |  24  |  4.55[H]    Ca    8.4      2025 03:00  Phos  5.1       Mg     2.2         TPro  5.7[L]  /  Alb  3.3  /  TBili  0.4  /  DBili      /  AST  34  /  ALT  <5[L]  /  AlkPhos  98                            8.1    9.42  )-----------( 134      ( 2025 03:00 )             25.8       Urine Studies:  Urinalysis Basic - ( 2025 03:00 )    Color:  / Appearance:  / SG:  / pH:   Gluc: 171 mg/dL / Ketone:   / Bili:  / Urobili:    Blood:  / Protein:  / Nitrite:    Leuk Esterase:  / RBC:  / WBC    Sq Epi:  / Non Sq Epi:  / Bacteria:             MEDICATIONS  (STANDING):  acetaminophen     Tablet .. 650 milliGRAM(s) Oral every 6 hours  ascorbic acid 500 milliGRAM(s) Oral two times a day  aspirin enteric coated 81 milliGRAM(s) Oral daily  atorvastatin 40 milliGRAM(s) Oral at bedtime  bisacodyl Suppository 10 milliGRAM(s) Rectal once  calcium acetate 667 milliGRAM(s) Oral three times a day with meals  ceFAZolin   IVPB 1000 milliGRAM(s) IV Intermittent every 24 hours  chlorhexidine 0.12% Liquid 15 milliLiter(s) Oral Mucosa every 12 hours  chlorhexidine 2% Cloths 1 Application(s) Topical <User Schedule>  clopidogrel Tablet 75 milliGRAM(s) Oral daily  dextrose 5%. 1000 milliLiter(s) (50 mL/Hr) IV Continuous <Continuous>  dextrose 5%. 1000 milliLiter(s) (100 mL/Hr) IV Continuous <Continuous>  dextrose 50% Injectable 50 milliLiter(s) IV Push every 15 minutes  dextrose 50% Injectable 25 milliLiter(s) IV Push every 15 minutes  gabapentin 100 milliGRAM(s) Oral every 8 hours  glucagon  Injectable 1 milliGRAM(s) IntraMuscular once  heparin   Injectable 5000 Unit(s) SubCutaneous every 8 hours  influenza  Vaccine (HIGH DOSE) 0.5 milliLiter(s) IntraMuscular once  insulin glargine Injectable (LANTUS) 8 Unit(s) SubCutaneous at bedtime  insulin lispro (ADMELOG) corrective regimen sliding scale   SubCutaneous three times a day before meals  insulin lispro (ADMELOG) corrective regimen sliding scale   SubCutaneous at bedtime  insulin lispro Injectable (ADMELOG) 2 Unit(s) SubCutaneous three times a day before meals  levETIRAcetam 500 milliGRAM(s) Oral two times a day  midodrine 10 milliGRAM(s) Oral every 8 hours  mupirocin 2% Ointment 1 Application(s) Both Nostrils two times a day  pantoprazole    Tablet 40 milliGRAM(s) Oral daily  phenylephrine    Infusion 0.1 MICROgram(s)/kG/Min (3.09 mL/Hr) IV Continuous <Continuous>  polyethylene glycol 3350 17 Gram(s) Oral daily  senna 2 Tablet(s) Oral at bedtime  sodium chloride 0.9%. 1000 milliLiter(s) (10 mL/Hr) IV Continuous <Continuous>  tamsulosin 0.4 milliGRAM(s) Oral at bedtime

## 2025-02-28 NOTE — OCCUPATIONAL THERAPY INITIAL EVALUATION ADULT - DIAGNOSIS, OT EVAL
Pt. is POD#2 (2/26) s/p CABG x 2. Upon assessment, pt. demo generalized deconditoning, impaired cognition/safety awareness, and impaired balance and postural control impacting engagement in ADLS and functional mob/transfers.

## 2025-02-28 NOTE — OCCUPATIONAL THERAPY INITIAL EVALUATION ADULT - MODIFIED CLINICAL TEST OF SENSORY INTEGRATION IN BALANCE TEST
pt. performed bed to chair transfer w. Mod Ax2 and 3rd person assist for lines. Pt. requires Mod VCS for sequencing/ hand and foot placement, pt. lacks safety awareness

## 2025-03-01 LAB
ALBUMIN SERPL ELPH-MCNC: 3.1 G/DL — LOW (ref 3.3–5)
ALBUMIN SERPL ELPH-MCNC: 3.8 G/DL — SIGNIFICANT CHANGE UP (ref 3.3–5)
ALP SERPL-CCNC: 138 U/L — HIGH (ref 40–120)
ALP SERPL-CCNC: 96 U/L — SIGNIFICANT CHANGE UP (ref 40–120)
ALT FLD-CCNC: <5 U/L — LOW (ref 10–45)
ALT FLD-CCNC: <5 U/L — LOW (ref 10–45)
ANION GAP SERPL CALC-SCNC: 14 MMOL/L — SIGNIFICANT CHANGE UP (ref 5–17)
ANION GAP SERPL CALC-SCNC: 15 MMOL/L — SIGNIFICANT CHANGE UP (ref 5–17)
APTT BLD: 31.1 SEC — SIGNIFICANT CHANGE UP (ref 24.5–35.6)
APTT BLD: 31.2 SEC — SIGNIFICANT CHANGE UP (ref 24.5–35.6)
AST SERPL-CCNC: 26 U/L — SIGNIFICANT CHANGE UP (ref 10–40)
AST SERPL-CCNC: 26 U/L — SIGNIFICANT CHANGE UP (ref 10–40)
BASOPHILS # BLD AUTO: 0.02 K/UL — SIGNIFICANT CHANGE UP (ref 0–0.2)
BASOPHILS # BLD AUTO: 0.03 K/UL — SIGNIFICANT CHANGE UP (ref 0–0.2)
BASOPHILS NFR BLD AUTO: 0.3 % — SIGNIFICANT CHANGE UP (ref 0–2)
BASOPHILS NFR BLD AUTO: 0.4 % — SIGNIFICANT CHANGE UP (ref 0–2)
BILIRUB SERPL-MCNC: 0.4 MG/DL — SIGNIFICANT CHANGE UP (ref 0.2–1.2)
BILIRUB SERPL-MCNC: 0.5 MG/DL — SIGNIFICANT CHANGE UP (ref 0.2–1.2)
BUN SERPL-MCNC: 17 MG/DL — SIGNIFICANT CHANGE UP (ref 7–23)
BUN SERPL-MCNC: 18 MG/DL — SIGNIFICANT CHANGE UP (ref 7–23)
CALCIUM SERPL-MCNC: 9 MG/DL — SIGNIFICANT CHANGE UP (ref 8.4–10.5)
CALCIUM SERPL-MCNC: 9.2 MG/DL — SIGNIFICANT CHANGE UP (ref 8.4–10.5)
CHLORIDE SERPL-SCNC: 101 MMOL/L — SIGNIFICANT CHANGE UP (ref 96–108)
CHLORIDE SERPL-SCNC: 98 MMOL/L — SIGNIFICANT CHANGE UP (ref 96–108)
CO2 SERPL-SCNC: 26 MMOL/L — SIGNIFICANT CHANGE UP (ref 22–31)
CO2 SERPL-SCNC: 26 MMOL/L — SIGNIFICANT CHANGE UP (ref 22–31)
CREAT SERPL-MCNC: 3.19 MG/DL — HIGH (ref 0.5–1.3)
CREAT SERPL-MCNC: 3.82 MG/DL — HIGH (ref 0.5–1.3)
EGFR: 15 ML/MIN/1.73M2 — LOW
EGFR: 15 ML/MIN/1.73M2 — LOW
EGFR: 19 ML/MIN/1.73M2 — LOW
EGFR: 19 ML/MIN/1.73M2 — LOW
EOSINOPHIL # BLD AUTO: 0.18 K/UL — SIGNIFICANT CHANGE UP (ref 0–0.5)
EOSINOPHIL # BLD AUTO: 0.24 K/UL — SIGNIFICANT CHANGE UP (ref 0–0.5)
EOSINOPHIL NFR BLD AUTO: 2.8 % — SIGNIFICANT CHANGE UP (ref 0–6)
EOSINOPHIL NFR BLD AUTO: 3.6 % — SIGNIFICANT CHANGE UP (ref 0–6)
GAS PNL BLDA: SIGNIFICANT CHANGE UP
GAS PNL BLDA: SIGNIFICANT CHANGE UP
GLUCOSE SERPL-MCNC: 127 MG/DL — HIGH (ref 70–99)
GLUCOSE SERPL-MCNC: 167 MG/DL — HIGH (ref 70–99)
HCT VFR BLD CALC: 24.8 % — LOW (ref 39–50)
HCT VFR BLD CALC: 32.7 % — LOW (ref 39–50)
HGB BLD-MCNC: 10.6 G/DL — LOW (ref 13–17)
HGB BLD-MCNC: 7.8 G/DL — LOW (ref 13–17)
IMM GRANULOCYTES NFR BLD AUTO: 0.6 % — SIGNIFICANT CHANGE UP (ref 0–0.9)
IMM GRANULOCYTES NFR BLD AUTO: 0.6 % — SIGNIFICANT CHANGE UP (ref 0–0.9)
INR BLD: 0.99 — SIGNIFICANT CHANGE UP (ref 0.85–1.16)
INR BLD: 1.08 — SIGNIFICANT CHANGE UP (ref 0.85–1.16)
LYMPHOCYTES # BLD AUTO: 1.4 K/UL — SIGNIFICANT CHANGE UP (ref 1–3.3)
LYMPHOCYTES # BLD AUTO: 1.42 K/UL — SIGNIFICANT CHANGE UP (ref 1–3.3)
LYMPHOCYTES # BLD AUTO: 20.9 % — SIGNIFICANT CHANGE UP (ref 13–44)
LYMPHOCYTES # BLD AUTO: 22.2 % — SIGNIFICANT CHANGE UP (ref 13–44)
MAGNESIUM SERPL-MCNC: 2.2 MG/DL — SIGNIFICANT CHANGE UP (ref 1.6–2.6)
MAGNESIUM SERPL-MCNC: 2.2 MG/DL — SIGNIFICANT CHANGE UP (ref 1.6–2.6)
MCHC RBC-ENTMCNC: 30.6 PG — SIGNIFICANT CHANGE UP (ref 27–34)
MCHC RBC-ENTMCNC: 31.1 PG — SIGNIFICANT CHANGE UP (ref 27–34)
MCHC RBC-ENTMCNC: 31.5 G/DL — LOW (ref 32–36)
MCHC RBC-ENTMCNC: 32.4 G/DL — SIGNIFICANT CHANGE UP (ref 32–36)
MCV RBC AUTO: 95.9 FL — SIGNIFICANT CHANGE UP (ref 80–100)
MCV RBC AUTO: 97.3 FL — SIGNIFICANT CHANGE UP (ref 80–100)
MONOCYTES # BLD AUTO: 0.67 K/UL — SIGNIFICANT CHANGE UP (ref 0–0.9)
MONOCYTES # BLD AUTO: 0.75 K/UL — SIGNIFICANT CHANGE UP (ref 0–0.9)
MONOCYTES NFR BLD AUTO: 10 % — SIGNIFICANT CHANGE UP (ref 2–14)
MONOCYTES NFR BLD AUTO: 11.7 % — SIGNIFICANT CHANGE UP (ref 2–14)
NEUTROPHILS # BLD AUTO: 3.99 K/UL — SIGNIFICANT CHANGE UP (ref 1.8–7.4)
NEUTROPHILS # BLD AUTO: 4.31 K/UL — SIGNIFICANT CHANGE UP (ref 1.8–7.4)
NEUTROPHILS NFR BLD AUTO: 62.4 % — SIGNIFICANT CHANGE UP (ref 43–77)
NEUTROPHILS NFR BLD AUTO: 64.5 % — SIGNIFICANT CHANGE UP (ref 43–77)
NRBC BLD AUTO-RTO: 0 /100 WBCS — SIGNIFICANT CHANGE UP (ref 0–0)
NRBC BLD AUTO-RTO: 0 /100 WBCS — SIGNIFICANT CHANGE UP (ref 0–0)
PHOSPHATE SERPL-MCNC: 1.7 MG/DL — LOW (ref 2.5–4.5)
PHOSPHATE SERPL-MCNC: 2.9 MG/DL — SIGNIFICANT CHANGE UP (ref 2.5–4.5)
PLATELET # BLD AUTO: 126 K/UL — LOW (ref 150–400)
PLATELET # BLD AUTO: 157 K/UL — SIGNIFICANT CHANGE UP (ref 150–400)
POTASSIUM SERPL-MCNC: 3.6 MMOL/L — SIGNIFICANT CHANGE UP (ref 3.5–5.3)
POTASSIUM SERPL-MCNC: 4.2 MMOL/L — SIGNIFICANT CHANGE UP (ref 3.5–5.3)
POTASSIUM SERPL-SCNC: 3.6 MMOL/L — SIGNIFICANT CHANGE UP (ref 3.5–5.3)
POTASSIUM SERPL-SCNC: 4.2 MMOL/L — SIGNIFICANT CHANGE UP (ref 3.5–5.3)
PROT SERPL-MCNC: 5.6 G/DL — LOW (ref 6–8.3)
PROT SERPL-MCNC: 6.9 G/DL — SIGNIFICANT CHANGE UP (ref 6–8.3)
PROTHROM AB SERPL-ACNC: 11.4 SEC — SIGNIFICANT CHANGE UP (ref 9.9–13.4)
PROTHROM AB SERPL-ACNC: 12.6 SEC — SIGNIFICANT CHANGE UP (ref 9.9–13.4)
RBC # BLD: 2.55 M/UL — LOW (ref 4.2–5.8)
RBC # BLD: 3.41 M/UL — LOW (ref 4.2–5.8)
RBC # FLD: 14.8 % — HIGH (ref 10.3–14.5)
RBC # FLD: 14.9 % — HIGH (ref 10.3–14.5)
SODIUM SERPL-SCNC: 139 MMOL/L — SIGNIFICANT CHANGE UP (ref 135–145)
SODIUM SERPL-SCNC: 141 MMOL/L — SIGNIFICANT CHANGE UP (ref 135–145)
WBC # BLD: 6.4 K/UL — SIGNIFICANT CHANGE UP (ref 3.8–10.5)
WBC # BLD: 6.69 K/UL — SIGNIFICANT CHANGE UP (ref 3.8–10.5)
WBC # FLD AUTO: 6.4 K/UL — SIGNIFICANT CHANGE UP (ref 3.8–10.5)
WBC # FLD AUTO: 6.69 K/UL — SIGNIFICANT CHANGE UP (ref 3.8–10.5)

## 2025-03-01 PROCEDURE — 99292 CRITICAL CARE ADDL 30 MIN: CPT

## 2025-03-01 PROCEDURE — 71045 X-RAY EXAM CHEST 1 VIEW: CPT | Mod: 26

## 2025-03-01 PROCEDURE — 99233 SBSQ HOSP IP/OBS HIGH 50: CPT

## 2025-03-01 PROCEDURE — 99291 CRITICAL CARE FIRST HOUR: CPT

## 2025-03-01 RX ORDER — MODAFINIL 200 MG/1
100 TABLET ORAL DAILY
Refills: 0 | Status: DISCONTINUED | OUTPATIENT
Start: 2025-03-01 | End: 2025-03-04

## 2025-03-01 RX ORDER — LEVETIRACETAM 10 MG/ML
500 INJECTION, SOLUTION INTRAVENOUS EVERY 12 HOURS
Refills: 0 | Status: DISCONTINUED | OUTPATIENT
Start: 2025-03-01 | End: 2025-03-10

## 2025-03-01 RX ORDER — TESTOSTERONE 5.5 MG/1
100 GEL NASAL DAILY
Refills: 0 | Status: DISCONTINUED | OUTPATIENT
Start: 2025-03-01 | End: 2025-03-04

## 2025-03-01 RX ORDER — CALCIUM GLUCONATE 20 MG/ML
2 INJECTION, SOLUTION INTRAVENOUS ONCE
Refills: 0 | Status: COMPLETED | OUTPATIENT
Start: 2025-03-01 | End: 2025-03-01

## 2025-03-01 RX ADMIN — Medication 650 MILLIGRAM(S): at 05:16

## 2025-03-01 RX ADMIN — HEPARIN SODIUM 5000 UNIT(S): 1000 INJECTION INTRAVENOUS; SUBCUTANEOUS at 05:17

## 2025-03-01 RX ADMIN — GABAPENTIN 100 MILLIGRAM(S): 400 CAPSULE ORAL at 05:16

## 2025-03-01 RX ADMIN — Medication 1 APPLICATION(S): at 05:17

## 2025-03-01 RX ADMIN — Medication 650 MILLIGRAM(S): at 06:16

## 2025-03-01 RX ADMIN — Medication 667 MILLIGRAM(S): at 11:28

## 2025-03-01 RX ADMIN — Medication 40 MILLIGRAM(S): at 11:14

## 2025-03-01 RX ADMIN — GABAPENTIN 100 MILLIGRAM(S): 400 CAPSULE ORAL at 13:12

## 2025-03-01 RX ADMIN — CALCIUM GLUCONATE 200 GRAM(S): 20 INJECTION, SOLUTION INTRAVENOUS at 21:03

## 2025-03-01 RX ADMIN — MUPIROCIN CALCIUM 1 APPLICATION(S): 20 CREAM TOPICAL at 18:05

## 2025-03-01 RX ADMIN — TESTOSTERONE 100 MILLIGRAM(S): 5.5 GEL NASAL at 14:55

## 2025-03-01 RX ADMIN — INSULIN LISPRO 2: 100 INJECTION, SOLUTION INTRAVENOUS; SUBCUTANEOUS at 11:15

## 2025-03-01 RX ADMIN — Medication 3.09 MICROGRAM(S)/KG/MIN: at 14:13

## 2025-03-01 RX ADMIN — GABAPENTIN 100 MILLIGRAM(S): 400 CAPSULE ORAL at 21:04

## 2025-03-01 RX ADMIN — Medication 500 MILLIGRAM(S): at 05:16

## 2025-03-01 RX ADMIN — TAMSULOSIN HYDROCHLORIDE 0.4 MILLIGRAM(S): 0.4 CAPSULE ORAL at 21:04

## 2025-03-01 RX ADMIN — MIDODRINE HYDROCHLORIDE 10 MILLIGRAM(S): 5 TABLET ORAL at 05:16

## 2025-03-01 RX ADMIN — INSULIN LISPRO 2 UNIT(S): 100 INJECTION, SOLUTION INTRAVENOUS; SUBCUTANEOUS at 16:26

## 2025-03-01 RX ADMIN — LEVETIRACETAM 500 MILLIGRAM(S): 10 INJECTION, SOLUTION INTRAVENOUS at 18:05

## 2025-03-01 RX ADMIN — POLYETHYLENE GLYCOL 3350 17 GRAM(S): 17 POWDER, FOR SOLUTION ORAL at 11:15

## 2025-03-01 RX ADMIN — HEPARIN SODIUM 5000 UNIT(S): 1000 INJECTION INTRAVENOUS; SUBCUTANEOUS at 13:12

## 2025-03-01 RX ADMIN — Medication 500 MILLIGRAM(S): at 18:05

## 2025-03-01 RX ADMIN — Medication 100 MILLIGRAM(S): at 05:18

## 2025-03-01 RX ADMIN — Medication 650 MILLIGRAM(S): at 18:05

## 2025-03-01 RX ADMIN — Medication 650 MILLIGRAM(S): at 11:15

## 2025-03-01 RX ADMIN — ATORVASTATIN CALCIUM 40 MILLIGRAM(S): 80 TABLET, FILM COATED ORAL at 21:04

## 2025-03-01 RX ADMIN — INSULIN GLARGINE-YFGN 8 UNIT(S): 100 INJECTION, SOLUTION SUBCUTANEOUS at 21:54

## 2025-03-01 RX ADMIN — Medication 81 MILLIGRAM(S): at 11:14

## 2025-03-01 RX ADMIN — CLOPIDOGREL BISULFATE 75 MILLIGRAM(S): 75 TABLET, FILM COATED ORAL at 11:28

## 2025-03-01 RX ADMIN — INSULIN LISPRO 2 UNIT(S): 100 INJECTION, SOLUTION INTRAVENOUS; SUBCUTANEOUS at 07:51

## 2025-03-01 RX ADMIN — Medication 667 MILLIGRAM(S): at 07:51

## 2025-03-01 RX ADMIN — MODAFINIL 100 MILLIGRAM(S): 200 TABLET ORAL at 14:55

## 2025-03-01 RX ADMIN — HEPARIN SODIUM 5000 UNIT(S): 1000 INJECTION INTRAVENOUS; SUBCUTANEOUS at 21:33

## 2025-03-01 RX ADMIN — Medication 2 TABLET(S): at 21:04

## 2025-03-01 RX ADMIN — INSULIN LISPRO 2 UNIT(S): 100 INJECTION, SOLUTION INTRAVENOUS; SUBCUTANEOUS at 11:15

## 2025-03-01 RX ADMIN — Medication 650 MILLIGRAM(S): at 14:06

## 2025-03-01 RX ADMIN — MUPIROCIN CALCIUM 1 APPLICATION(S): 20 CREAM TOPICAL at 05:17

## 2025-03-01 RX ADMIN — MIDODRINE HYDROCHLORIDE 10 MILLIGRAM(S): 5 TABLET ORAL at 13:12

## 2025-03-01 RX ADMIN — Medication 667 MILLIGRAM(S): at 18:05

## 2025-03-01 NOTE — PROGRESS NOTE ADULT - SUBJECTIVE AND OBJECTIVE BOX
CTICU  CRITICAL  CARE  attending     Hand off received 	    CHIEF COMPLAINT :  vasogenic shock  encephalopathy    				   Pertinent clinical, laboratory, radiographic, hemodynamic, echocardiographic, respiratory data, microbiologic data and chart were reviewed and analyzed frequently throughout the course of the day and night  Patient seen and examined with CTS/ SH attending at bedside    INTERVAL HISTORY  uneventful hs     Pt is a 84y , Male, HEALTH ISSUES - PROBLEM Dx:      , FAMILY HISTORY:  Known health problems: none (Father, Mother)    PAST MEDICAL & SURGICAL HISTORY:  HTN (hypertension)      Gout      DM (diabetes mellitus)      History of BPH      HLD (hyperlipidemia)      CAD (coronary artery disease)  one stent 6 yrs      End stage renal disease      History of cholecystectomy      S/P arteriovenous (AV) fistula creation  1 yr ago        Patient is a 84y old  Male who presents with a chief complaint of Coronary Artery Disease (01 Mar 2025 13:57)      14 system review was unremarkable    Vital signs, hemodynamic and respiratory parameters were reviewed from the bedside nursing flowsheet.  ICU Vital Signs Last 24 Hrs  T(C): 36.7 (01 Mar 2025 14:00), Max: 36.7 (28 Feb 2025 18:13)  T(F): 98.1 (01 Mar 2025 14:00), Max: 98.1 (01 Mar 2025 14:00)  HR: 70 (01 Mar 2025 16:03) (58 - 78)  BP: --  BP(mean): --  ABP: 172/46 (01 Mar 2025 16:00) (119/24 - 172/46)  ABP(mean): 76 (01 Mar 2025 16:00) (52 - 76)  RR: 18 (01 Mar 2025 16:03) (16 - 21)  SpO2: 99% (01 Mar 2025 16:03) (92% - 99%)    O2 Parameters below as of 01 Mar 2025 17:00  Patient On (Oxygen Delivery Method): nasal cannula, high flow  O2 Flow (L/min): 50  O2 Concentration (%): 60      Adult Advanced Hemodynamics Last 24 Hrs  CVP(mm Hg): 19 (01 Mar 2025 16:00) (9 - 28)  CVP(cm H2O): --  CO: --  CI: --  PA: --  PA(mean): --  PCWP: --  SVR: --  SVRI: --  PVR: --  PVRI: --, ABG - ( 01 Mar 2025 03:27 )  pH, Arterial: 7.42  pH, Blood: x     /  pCO2: 46    /  pO2: 88    / HCO3: 30    / Base Excess: 4.5   /  SaO2: 99.3                Intake and output was reviewed and the fluid balance was calculated  Daily     Daily   I&O's Summary    28 Feb 2025 07:01  -  01 Mar 2025 07:00  --------------------------------------------------------  IN: 1617.2 mL / OUT: 3330 mL / NET: -1712.8 mL    01 Mar 2025 07:01  -  01 Mar 2025 16:44  --------------------------------------------------------  IN: 274 mL / OUT: 30 mL / NET: 244 mL        All lines and drain sites were assessed  [facility]      POCT Blood Glucose.: 146 mg/dL (01 Mar 2025 16:17)    No acute change in mental status  Auscultation of the chest reveals equal bs  Abdomen is soft  Extremities are warm and well perfused  Wounds appear clean and unremarkable  Antibiotics are periop    labs  CBC Full  -  ( 01 Mar 2025 03:06 )  WBC Count : 6.40 K/uL  RBC Count : 2.55 M/uL  Hemoglobin : 7.8 g/dL  Hematocrit : 24.8 %  Platelet Count - Automated : 126 K/uL  Mean Cell Volume : 97.3 fl  Mean Cell Hemoglobin : 30.6 pg  Mean Cell Hemoglobin Concentration : 31.5 g/dL  Auto Neutrophil # : x  Auto Lymphocyte # : x  Auto Monocyte # : x  Auto Eosinophil # : x  Auto Basophil # : x  Auto Neutrophil % : x  Auto Lymphocyte % : x  Auto Monocyte % : x  Auto Eosinophil % : x  Auto Basophil % : x    03-01    141  |  101  |  18  ----------------------------<  127[H]  4.2   |  26  |  3.82[H]    Ca    9.0      01 Mar 2025 03:06  Phos  2.9     03-01  Mg     2.2     03-01    TPro  5.6[L]  /  Alb  3.1[L]  /  TBili  0.4  /  DBili  x   /  AST  26  /  ALT  <5[L]  /  AlkPhos  96  03-01    PT/INR - ( 01 Mar 2025 03:06 )   PT: 12.6 sec;   INR: 1.08          PTT - ( 01 Mar 2025 03:06 )  PTT:31.2 sec  The current medications were reviewed   MEDICATIONS  (STANDING):  acetaminophen     Tablet .. 650 milliGRAM(s) Oral every 6 hours  ascorbic acid 500 milliGRAM(s) Oral two times a day  aspirin enteric coated 81 milliGRAM(s) Oral daily  atorvastatin 40 milliGRAM(s) Oral at bedtime  bisacodyl Suppository 10 milliGRAM(s) Rectal once  calcium acetate 667 milliGRAM(s) Oral three times a day with meals  ceFAZolin   IVPB 1000 milliGRAM(s) IV Intermittent every 24 hours  chlorhexidine 2% Cloths 1 Application(s) Topical <User Schedule>  clopidogrel Tablet 75 milliGRAM(s) Oral daily  dexMEDEtomidine Infusion 0.1 MICROgram(s)/kG/Hr (2.06 mL/Hr) IV Continuous <Continuous>  dextrose 5%. 1000 milliLiter(s) (50 mL/Hr) IV Continuous <Continuous>  dextrose 5%. 1000 milliLiter(s) (100 mL/Hr) IV Continuous <Continuous>  dextrose 50% Injectable 50 milliLiter(s) IV Push every 15 minutes  dextrose 50% Injectable 25 milliLiter(s) IV Push every 15 minutes  gabapentin 100 milliGRAM(s) Oral every 8 hours  glucagon  Injectable 1 milliGRAM(s) IntraMuscular once  heparin   Injectable 5000 Unit(s) SubCutaneous every 8 hours  influenza  Vaccine (HIGH DOSE) 0.5 milliLiter(s) IntraMuscular once  insulin glargine Injectable (LANTUS) 8 Unit(s) SubCutaneous at bedtime  insulin lispro (ADMELOG) corrective regimen sliding scale   SubCutaneous three times a day before meals  insulin lispro (ADMELOG) corrective regimen sliding scale   SubCutaneous at bedtime  insulin lispro Injectable (ADMELOG) 2 Unit(s) SubCutaneous three times a day before meals  levETIRAcetam 500 milliGRAM(s) Oral every 12 hours  midodrine 10 milliGRAM(s) Oral every 8 hours  modafinil 100 milliGRAM(s) Oral daily  mupirocin 2% Ointment 1 Application(s) Both Nostrils two times a day  pantoprazole    Tablet 40 milliGRAM(s) Oral daily  phenylephrine    Infusion 0.1 MICROgram(s)/kG/Min (3.09 mL/Hr) IV Continuous <Continuous>  polyethylene glycol 3350 17 Gram(s) Oral daily  senna 2 Tablet(s) Oral at bedtime  sodium chloride 0.9%. 1000 milliLiter(s) (10 mL/Hr) IV Continuous <Continuous>  tamsulosin 0.4 milliGRAM(s) Oral at bedtime  testosterone 1% Gel 100 milliGRAM(s) Topical daily    MEDICATIONS  (PRN):  dextrose Oral Gel 15 Gram(s) Oral once PRN Blood Glucose LESS THAN 70 milliGRAM(s)/deciliter       PROBLEM LIST/ ASSESSMENT:  HEALTH ISSUES - PROBLEM Dx:      ,   Patient is a 84y old  Male who presents with a chief complaint of Coronary Artery Disease (01 Mar 2025 13:57)     s/p cardiac surgery      Vasogenic shock due to hypotension in the cticu , will keep on pressors        Acute blood loss anemia with relative hypotension treated with > 1 unit PC    ESRD      Moderate protein calorie malutrition        My plan includes :    hd today     pc repletion    modaf and frailty mitigation regiment  close hemodynamic, ventilatory and drain monitoring and management per post op routine    Monitor for arrhythmias and monitor parameters for organ perfusion  beta blockade not administered due to hemodynamic instability and bradycardia  monitor neurologic status  Head of the bed should remain elevated to 45 deg .   chest PT and IS will be encouraged  monitor adequacy of oxygenation and ventilation and attempt to wean oxygen  antibiotic regimen will be tailored to the clinical, laboratory and microbiologic data  Nutritional goals will be met using po eventually , ensure adequate caloric intake and montior the same  Stress ulcer and VTE prophylaxis will be achieved    Glycemic control is satisfactory  Electrolytes have been repleted as necessary and wound care has been carried out. Pain control has been achieved.   agressive physical therapy and early mobility and ambulation goals will be met   The family was updated about the course and plan  CRITICAL CARE TIME Upon my evaluation, this patient had a high probability of imminent or life-threatening deterioration due to the above problems which required my direct attention, intervention, and personal management.  I have personally provided 110 minutes of critical care time exclusive of time spent on separately billable procedures. Time included review of laboratory data, radiology results, discussion with consultants, and monitoring for potential decompensation. Interventions were performed as documented abovepersonally provided by me  in evaluation and management, reassessments, review and interpretation of labs and x-rays, ventilator and hemodynamic management, formulating a plan and coordinating care. Time spent was non routine post-operarive caRE and included multiple and repeated evaluations at the bedside  Time does not include procedural time.    CTICU ATTENDING     					    Israel Rasmussen MD

## 2025-03-01 NOTE — PROGRESS NOTE ADULT - SUBJECTIVE AND OBJECTIVE BOX
NEPHROLOGY PROGRESS NOTE    Subjective: Patient seen and examined at bedside. Feels okay but still short of breath requiring elevation of bed. POCUS as below    [OBJECTIVE]:    Vital Signs:  T(F): , Max: 98.4 (25 @ 15:30)  HR:  (58 - 79)  BP: --  BP(mean): --  ABP: 141/36 (25 @ 12:00) (119/24 - 189/46)  ABP(mean):  (52 - 79)  RR:  (16 - 21)  SpO2:  (92% - 100%)  CVP(mm Hg): 15 (25 @ 12:00) (9 - 21)  CVP(cm H2O): --       @ 07:01  -   @ 07:00  --------------------------------------------------------  IN: 1617.2 mL / OUT: 3330 mL / NET: -1712.8 mL     @ 07:01  -   @ 13:57  --------------------------------------------------------  IN: 112 mL / OUT: 0 mL / NET: 112 mL      CAPILLARY BLOOD GLUCOSE      POCT Blood Glucose.: 178 mg/dL (01 Mar 2025 11:03)      .  VITAL SIGNS:  T(F): 97.8 (25 @ 09:00), Max: 98.4 (25 @ 15:30)  HR: 69 (25 @ 12:19) (58 - 79)  BP: --  BP(mean): --  RR: 17 (25 @ 12:19) (16 - 21)  SpO2: 98% (25 @ 12:19) (92% - 100%)    PHYSICAL EXAM:  Constitutional: Resting comfortably in chair; NAD  HEENT:  EOMI, anicteric sclera; MMM  Respiratory: on oxygen, mild accessory muscle use  Cardiac: +S1/S2; RRR; no M/R/G  Gastrointestinal: soft, NT/ND; no rebound or guarding; +BS  Extremities: WWP, no clubbing or cyanosis; no peripheral edema  Dermatologic: skin warm, dry and intact; no rashes, wounds, or scars  Access:  RUE AVF    POCUS - B lines throughout lungs graetest in bases, small effusion, dilated IVC, Negative Vexus    Medications:  MEDICATIONS  (STANDING):  acetaminophen     Tablet .. 650 milliGRAM(s) Oral every 6 hours  ascorbic acid 500 milliGRAM(s) Oral two times a day  aspirin enteric coated 81 milliGRAM(s) Oral daily  atorvastatin 40 milliGRAM(s) Oral at bedtime  bisacodyl Suppository 10 milliGRAM(s) Rectal once  calcium acetate 667 milliGRAM(s) Oral three times a day with meals  ceFAZolin   IVPB 1000 milliGRAM(s) IV Intermittent every 24 hours  chlorhexidine 2% Cloths 1 Application(s) Topical <User Schedule>  clopidogrel Tablet 75 milliGRAM(s) Oral daily  dexMEDEtomidine Infusion 0.1 MICROgram(s)/kG/Hr (2.06 mL/Hr) IV Continuous <Continuous>  dextrose 5%. 1000 milliLiter(s) (50 mL/Hr) IV Continuous <Continuous>  dextrose 5%. 1000 milliLiter(s) (100 mL/Hr) IV Continuous <Continuous>  dextrose 50% Injectable 50 milliLiter(s) IV Push every 15 minutes  dextrose 50% Injectable 25 milliLiter(s) IV Push every 15 minutes  gabapentin 100 milliGRAM(s) Oral every 8 hours  glucagon  Injectable 1 milliGRAM(s) IntraMuscular once  heparin   Injectable 5000 Unit(s) SubCutaneous every 8 hours  influenza  Vaccine (HIGH DOSE) 0.5 milliLiter(s) IntraMuscular once  insulin glargine Injectable (LANTUS) 8 Unit(s) SubCutaneous at bedtime  insulin lispro (ADMELOG) corrective regimen sliding scale   SubCutaneous three times a day before meals  insulin lispro (ADMELOG) corrective regimen sliding scale   SubCutaneous at bedtime  insulin lispro Injectable (ADMELOG) 2 Unit(s) SubCutaneous three times a day before meals  levETIRAcetam 500 milliGRAM(s) Oral every 12 hours  midodrine 10 milliGRAM(s) Oral every 8 hours  modafinil 100 milliGRAM(s) Oral daily  mupirocin 2% Ointment 1 Application(s) Both Nostrils two times a day  pantoprazole    Tablet 40 milliGRAM(s) Oral daily  phenylephrine    Infusion 0.1 MICROgram(s)/kG/Min (3.09 mL/Hr) IV Continuous <Continuous>  polyethylene glycol 3350 17 Gram(s) Oral daily  senna 2 Tablet(s) Oral at bedtime  sodium chloride 0.9%. 1000 milliLiter(s) (10 mL/Hr) IV Continuous <Continuous>  tamsulosin 0.4 milliGRAM(s) Oral at bedtime  testosterone 1% Gel 100 milliGRAM(s) Topical daily    MEDICATIONS  (PRN):  dextrose Oral Gel 15 Gram(s) Oral once PRN Blood Glucose LESS THAN 70 milliGRAM(s)/deciliter      Allergies:  Allergies    No Known Allergies    Intolerances        Labs:                        7.8    6.40  )-----------( 126      ( 01 Mar 2025 03:06 )             24.8         141  |  101  |  18  ----------------------------<  127[H]  4.2   |  26  |  3.82[H]    Ca    9.0      01 Mar 2025 03:06  Phos  2.9       Mg     2.2         TPro  5.6[L]  /  Alb  3.1[L]  /  TBili  0.4  /  DBili  x   /  AST  26  /  ALT  <5[L]  /  AlkPhos  96  03-    PT/INR - ( 01 Mar 2025 03:06 )   PT: 12.6 sec;   INR: 1.08          PTT - ( 01 Mar 2025 03:06 )  PTT:31.2 sec  Urinalysis Basic - ( 01 Mar 2025 03:06 )    Color: x / Appearance: x / SG: x / pH: x  Gluc: 127 mg/dL / Ketone: x  / Bili: x / Urobili: x   Blood: x / Protein: x / Nitrite: x   Leuk Esterase: x / RBC: x / WBC x   Sq Epi: x / Non Sq Epi: x / Bacteria: x        Radiology and other tests:  Hemoglobin: 7.8 g/dL (25 @ 03:06)  Phosphorus: 2.9 mg/dL (25 @ 03:06)  Hemoglobin: 8.1 g/dL (25 @ 18:21)  Phosphorus: 2.7 mg/dL (25 @ 18:21)    Albumin: 3.1 g/dL (25 @ 03:06)  Albumin: 3.4 g/dL (25 @ 18:21)    calcium acetate 667 milliGRAM(s) Oral three times a day with meals, 25 @ 17:14, Routine  calcium acetate 667 milliGRAM(s) Oral three times a day with meals, 25 @ 02:37, Routine  epoetin shanta-epbx (RETACRIT) Injectable 8000 Unit(s) IV Push once, 25 @ 12:21, Routine, Stop order after: 1 Doses  epoetin shanta-epbx (RETACRIT) Injectable 8000 Unit(s) IV Push once, 25 @ 17:29, Routine, Stop order after: 1 Doses  sevelamer carbonate 800 milliGRAM(s) Oral every 12 hours, 25 @ 17:14, Routine  sevelamer carbonate 800 milliGRAM(s) Oral two times a day with meals, 25 @ 02:31, Routine      Hemodialysis Treatment.:     Schedule: Once, Modality: Ultrafiltration, Access: Arteriovenous Fistula    Dialyzer: Optiflux [id number], Time: 150 Min    Blood Flow: 400 mL/Min , Tubinmm (Adult)    Target Fluid Removal: 2 Liters (25 @ 09:16) [Active]

## 2025-03-01 NOTE — PROGRESS NOTE ADULT - ASSESSMENT
84-year-old M with PMH of ESRD on HD TTS transferred to [facility] for CABG (2/24).  CABG performed on 2/26.  Nephrology following for ESRD management and postoperative volume management.    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 Will follow closely with you, please call with questions or concerns

## 2025-03-01 NOTE — PROGRESS NOTE ADULT - SUBJECTIVE AND OBJECTIVE BOX
CTICU  CRITICAL  CARE  attending     Hand off received 					   Pertinent clinical, laboratory, radiographic, hemodynamic, echocardiographic, respiratory data, microbiologic data and chart were reviewed and analyzed frequently throughout the course of the day and night        85 yo M with ESRD on HD TTS, coronary artery disease s/p PCI, Type 2 diabetes, hypertension, hyperlipidemia, benign prostate hypertrophy, bladder diverticulum, and chronic hip pain.  He presented to [facility] with angina.    He ruled in for NSTEMI.    He underwent a cardiac cath which showed 3v CAD.    He was started on heparin drip and transferred to [facility] for CABG.    S/P off pump CABG x 3.         FAMILY HISTORY:  Known health problems: none (Father, Mother)    PAST MEDICAL & SURGICAL HISTORY:  HTN (hypertension)  Gout  DM (diabetes mellitus)  History of BPH  HLD (hyperlipidemia)  CAD (coronary artery disease) one stent 6 yrs  End stage renal disease  History of cholecystectomy  S/P arteriovenous (AV) fistula creation 1 yr ago        14 system review was unremarkable    Vital signs, hemodynamic and respiratory parameters were reviewed from the bedside nursing flow sheet.  ICU Vital Signs Last 24 Hrs  T(C): 36.3 (01 Mar 2025 17:58), Max: 36.7 (2025 21:48)  T(F): 97.3 (01 Mar 2025 17:58), Max: 98.1 (01 Mar 2025 14:00)  HR: 73 (01 Mar 2025 18:00) (58 - 78)  BP: --  BP(mean): --  ABP: 171/41 (01 Mar 2025 18:00) (119/24 - 181/49)  ABP(mean): 70 (01 Mar 2025 18:00) (52 - 82)  RR: 18 (01 Mar 2025 18:00) (16 - 21)  SpO2: 100% (01 Mar 2025 18:00) (92% - 100%)    O2 Parameters below as of 01 Mar 2025 19:00  Patient On (Oxygen Delivery Method): nasal cannula, high flow  O2 Flow (L/min): 50  O2 Concentration (%): 60      Adult Advanced Hemodynamics Last 24 Hrs  CVP(mm Hg): 91 (01 Mar 2025 18:00) (9 - 91)  CVP(cm H2O): --  CO: --  CI: --  PA: --  PA(mean): --  PCWP: --  SVR: --  SVRI: --  PVR: --  PVRI: --, ABG - ( 01 Mar 2025 03:27 )  pH, Arterial: 7.42  pH, Blood: x     /  pCO2: 46    /  pO2: 88    / HCO3: 30    / Base Excess: 4.5   /  SaO2: 99.3                Intake and output was reviewed and the fluid balance was calculated  Daily     Daily Weight in k.9 (01 Mar 2025 17:30)  I&O's Summary    2025 07:  -  01 Mar 2025 07:00  --------------------------------------------------------  IN: 1617.2 mL / OUT: 3330 mL / NET: -1712.8 mL    01 Mar 2025 07:01  -  01 Mar 2025 19:35  --------------------------------------------------------  IN: 594 mL / OUT: 2330 mL / NET: -1736 mL        All lines and drain sites were assessed    Neuro: No change in the mental status from the baseline.   Neck: No JVD.  CVS: S1, S2, No S3.  Lungs: Good air entry bilaterally.  Abd: Soft. No tenderness. + Bowel sounds.  Vascular: + DP/PT.  Extremities: No edema.  Lymphatic: Normal.  Skin: No abnormalities.      labs  CBC Full  -  ( 01 Mar 2025 03:06 )  WBC Count : 6.40 K/uL  RBC Count : 2.55 M/uL  Hemoglobin : 7.8 g/dL  Hematocrit : 24.8 %  Platelet Count - Automated : 126 K/uL  Mean Cell Volume : 97.3 fl  Mean Cell Hemoglobin : 30.6 pg  Mean Cell Hemoglobin Concentration : 31.5 g/dL  Auto Neutrophil # : x  Auto Lymphocyte # : x  Auto Monocyte # : x  Auto Eosinophil # : x  Auto Basophil # : x  Auto Neutrophil % : x  Auto Lymphocyte % : x  Auto Monocyte % : x  Auto Eosinophil % : x  Auto Basophil % : x        141  |  101  |  18  ----------------------------<  127[H]  4.2   |  26  |  3.82[H]    Ca    9.0      01 Mar 2025 03:06  Phos  2.9       Mg     2.2         TPro  5.6[L]  /  Alb  3.1[L]  /  TBili  0.4  /  DBili  x   /  AST  26  /  ALT  <5[L]  /  AlkPhos  96      PT/INR - ( 01 Mar 2025 03:06 )   PT: 12.6 sec;   INR: 1.08          PTT - ( 01 Mar 2025 03:06 )  PTT:31.2 sec  The current medications were reviewed   MEDICATIONS  (STANDING):  ascorbic acid 500 milliGRAM(s) Oral two times a day  aspirin enteric coated 81 milliGRAM(s) Oral daily  atorvastatin 40 milliGRAM(s) Oral at bedtime  calcium acetate 667 milliGRAM(s) Oral three times a day with meals  ceFAZolin   IVPB 1000 milliGRAM(s) IV Intermittent every 24 hours  chlorhexidine 2% Cloths 1 Application(s) Topical <User Schedule>  clopidogrel Tablet 75 milliGRAM(s) Oral daily  dextrose 5%. 1000 milliLiter(s) (50 mL/Hr) IV Continuous <Continuous>  dextrose 5%. 1000 milliLiter(s) (100 mL/Hr) IV Continuous <Continuous>  dextrose 50% Injectable 50 milliLiter(s) IV Push every 15 minutes  dextrose 50% Injectable 25 milliLiter(s) IV Push every 15 minutes  gabapentin 100 milliGRAM(s) Oral every 8 hours  heparin   Injectable 5000 Unit(s) SubCutaneous every 8 hours  influenza  Vaccine (HIGH DOSE) 0.5 milliLiter(s) IntraMuscular once  insulin glargine Injectable (LANTUS) 8 Unit(s) SubCutaneous at bedtime  insulin lispro (ADMELOG) corrective regimen sliding scale   SubCutaneous three times a day before meals  insulin lispro (ADMELOG) corrective regimen sliding scale   SubCutaneous at bedtime  insulin lispro Injectable (ADMELOG) 2 Unit(s) SubCutaneous three times a day before meals  levETIRAcetam 500 milliGRAM(s) Oral every 12 hours  midodrine 10 milliGRAM(s) Oral every 8 hours  modafinil 100 milliGRAM(s) Oral daily  mupirocin 2% Ointment 1 Application(s) Both Nostrils two times a day  pantoprazole    Tablet 40 milliGRAM(s) Oral daily  phenylephrine    Infusion 0.1 MICROgram(s)/kG/Min (3.09 mL/Hr) IV Continuous <Continuous>  polyethylene glycol 3350 17 Gram(s) Oral daily  senna 2 Tablet(s) Oral at bedtime  sodium chloride 0.9%. 1000 milliLiter(s) (10 mL/Hr) IV Continuous <Continuous>  tamsulosin 0.4 milliGRAM(s) Oral at bedtime  testosterone 1% Gel 100 milliGRAM(s) Topical daily    MEDICATIONS  (PRN):  dextrose Oral Gel 15 Gram(s) Oral once PRN Blood Glucose LESS THAN 70 milliGRAM(s)/deciliter          84 year old  Male admitted with Coronary Artery Disease   S/P Off pump CABG x 2 (LIMA to LAD, SVG to OM2).   Arrived to ICU intubated.  Postoperative course complicated by encephalopathy, vaso plegia, posthemorrhagic anemia requiring vasopressor support.  He was extubated.  Hemodynamically stable.  Fair oxygenation.  Hemodialysis two liters today          My plan includes :  Statin Rx.  Dual antiplatelet Rx.  Close hemodynamic monitoring   Monitor for arrhythmias and monitor parameters for organ perfusion  Monitor neurologic status  Monitor renal function.  Head of the bed should remain elevated to 45 deg .   Chest PT and IS will be encouraged  Monitor adequacy of oxygenation   Nutritional goals will be met using po eventually , ensure adequate caloric intake and monitor the same  Stress ulcer and VTE prophylaxis will be achieved    Glycemic control is satisfactory  Electrolytes have been repleted as necessary and wound care has been carried out. Pain control has been achieved.   Aggressive physical therapy and early mobility and ambulation goals will be met   The family was updated about the course and plan  CRITICAL CARE TIME SPENT in evaluation and management, reassessments, review and interpretation of labs and x-rays, hemodynamic management, formulating a plan and coordinating care: ___30____ MIN.  Time does not include procedural time.  CTICU ATTENDING     					    Guy Geller MD

## 2025-03-02 LAB
ADD ON TEST-SPECIMEN IN LAB: SIGNIFICANT CHANGE UP
ALBUMIN SERPL ELPH-MCNC: 3.3 G/DL — SIGNIFICANT CHANGE UP (ref 3.3–5)
ALP SERPL-CCNC: 132 U/L — HIGH (ref 40–120)
ALT FLD-CCNC: <5 U/L — LOW (ref 10–45)
ANION GAP SERPL CALC-SCNC: 15 MMOL/L — SIGNIFICANT CHANGE UP (ref 5–17)
APTT BLD: 33 SEC — SIGNIFICANT CHANGE UP (ref 24.5–35.6)
APTT BLD: 33.9 SEC — SIGNIFICANT CHANGE UP (ref 24.5–35.6)
AST SERPL-CCNC: 17 U/L — SIGNIFICANT CHANGE UP (ref 10–40)
BASOPHILS # BLD AUTO: 0.01 K/UL — SIGNIFICANT CHANGE UP (ref 0–0.2)
BASOPHILS # BLD AUTO: 0.02 K/UL — SIGNIFICANT CHANGE UP (ref 0–0.2)
BASOPHILS NFR BLD AUTO: 0.2 % — SIGNIFICANT CHANGE UP (ref 0–2)
BASOPHILS NFR BLD AUTO: 0.3 % — SIGNIFICANT CHANGE UP (ref 0–2)
BILIRUB SERPL-MCNC: 0.4 MG/DL — SIGNIFICANT CHANGE UP (ref 0.2–1.2)
BUN SERPL-MCNC: 27 MG/DL — HIGH (ref 7–23)
CALCIUM SERPL-MCNC: 9.4 MG/DL — SIGNIFICANT CHANGE UP (ref 8.4–10.5)
CHLORIDE SERPL-SCNC: 96 MMOL/L — SIGNIFICANT CHANGE UP (ref 96–108)
CO2 SERPL-SCNC: 25 MMOL/L — SIGNIFICANT CHANGE UP (ref 22–31)
CREAT SERPL-MCNC: 5.29 MG/DL — HIGH (ref 0.5–1.3)
EGFR: 10 ML/MIN/1.73M2 — LOW
EGFR: 10 ML/MIN/1.73M2 — LOW
EOSINOPHIL # BLD AUTO: 0.22 K/UL — SIGNIFICANT CHANGE UP (ref 0–0.5)
EOSINOPHIL # BLD AUTO: 0.25 K/UL — SIGNIFICANT CHANGE UP (ref 0–0.5)
EOSINOPHIL NFR BLD AUTO: 3.8 % — SIGNIFICANT CHANGE UP (ref 0–6)
EOSINOPHIL NFR BLD AUTO: 4.7 % — SIGNIFICANT CHANGE UP (ref 0–6)
GAS PNL BLDA: SIGNIFICANT CHANGE UP
GAS PNL BLDA: SIGNIFICANT CHANGE UP
GLUCOSE SERPL-MCNC: 196 MG/DL — HIGH (ref 70–99)
HCT VFR BLD CALC: 27.8 % — LOW (ref 39–50)
HCT VFR BLD CALC: 30.2 % — LOW (ref 39–50)
HGB BLD-MCNC: 9.1 G/DL — LOW (ref 13–17)
HGB BLD-MCNC: 9.7 G/DL — LOW (ref 13–17)
IMM GRANULOCYTES NFR BLD AUTO: 0.7 % — SIGNIFICANT CHANGE UP (ref 0–0.9)
IMM GRANULOCYTES NFR BLD AUTO: 0.9 % — SIGNIFICANT CHANGE UP (ref 0–0.9)
INR BLD: 1.03 — SIGNIFICANT CHANGE UP (ref 0.85–1.16)
INR BLD: 1.04 — SIGNIFICANT CHANGE UP (ref 0.85–1.16)
LYMPHOCYTES # BLD AUTO: 1.05 K/UL — SIGNIFICANT CHANGE UP (ref 1–3.3)
LYMPHOCYTES # BLD AUTO: 1.29 K/UL — SIGNIFICANT CHANGE UP (ref 1–3.3)
LYMPHOCYTES # BLD AUTO: 18 % — SIGNIFICANT CHANGE UP (ref 13–44)
LYMPHOCYTES # BLD AUTO: 24.3 % — SIGNIFICANT CHANGE UP (ref 13–44)
MAGNESIUM SERPL-MCNC: 2.2 MG/DL — SIGNIFICANT CHANGE UP (ref 1.6–2.6)
MAGNESIUM SERPL-MCNC: 2.3 MG/DL — SIGNIFICANT CHANGE UP (ref 1.6–2.6)
MCHC RBC-ENTMCNC: 31.4 PG — SIGNIFICANT CHANGE UP (ref 27–34)
MCHC RBC-ENTMCNC: 31.9 PG — SIGNIFICANT CHANGE UP (ref 27–34)
MCHC RBC-ENTMCNC: 32.1 G/DL — SIGNIFICANT CHANGE UP (ref 32–36)
MCHC RBC-ENTMCNC: 32.7 G/DL — SIGNIFICANT CHANGE UP (ref 32–36)
MCV RBC AUTO: 97.5 FL — SIGNIFICANT CHANGE UP (ref 80–100)
MCV RBC AUTO: 97.7 FL — SIGNIFICANT CHANGE UP (ref 80–100)
MONOCYTES # BLD AUTO: 0.64 K/UL — SIGNIFICANT CHANGE UP (ref 0–0.9)
MONOCYTES # BLD AUTO: 0.65 K/UL — SIGNIFICANT CHANGE UP (ref 0–0.9)
MONOCYTES NFR BLD AUTO: 11.1 % — SIGNIFICANT CHANGE UP (ref 2–14)
MONOCYTES NFR BLD AUTO: 12.1 % — SIGNIFICANT CHANGE UP (ref 2–14)
NEUTROPHILS # BLD AUTO: 3.07 K/UL — SIGNIFICANT CHANGE UP (ref 1.8–7.4)
NEUTROPHILS # BLD AUTO: 3.85 K/UL — SIGNIFICANT CHANGE UP (ref 1.8–7.4)
NEUTROPHILS NFR BLD AUTO: 57.8 % — SIGNIFICANT CHANGE UP (ref 43–77)
NEUTROPHILS NFR BLD AUTO: 66.1 % — SIGNIFICANT CHANGE UP (ref 43–77)
NRBC BLD AUTO-RTO: 0 /100 WBCS — SIGNIFICANT CHANGE UP (ref 0–0)
NRBC BLD AUTO-RTO: 0 /100 WBCS — SIGNIFICANT CHANGE UP (ref 0–0)
PHOSPHATE SERPL-MCNC: 1.7 MG/DL — LOW (ref 2.5–4.5)
PHOSPHATE SERPL-MCNC: 2.3 MG/DL — LOW (ref 2.5–4.5)
PLATELET # BLD AUTO: 128 K/UL — LOW (ref 150–400)
PLATELET # BLD AUTO: 131 K/UL — LOW (ref 150–400)
POTASSIUM SERPL-MCNC: 4 MMOL/L — SIGNIFICANT CHANGE UP (ref 3.5–5.3)
POTASSIUM SERPL-SCNC: 4 MMOL/L — SIGNIFICANT CHANGE UP (ref 3.5–5.3)
PROT SERPL-MCNC: 6.3 G/DL — SIGNIFICANT CHANGE UP (ref 6–8.3)
PROTHROM AB SERPL-ACNC: 12 SEC — SIGNIFICANT CHANGE UP (ref 9.9–13.4)
PROTHROM AB SERPL-ACNC: 12.1 SEC — SIGNIFICANT CHANGE UP (ref 9.9–13.4)
RBC # BLD: 2.85 M/UL — LOW (ref 4.2–5.8)
RBC # BLD: 3.09 M/UL — LOW (ref 4.2–5.8)
RBC # FLD: 14.6 % — HIGH (ref 10.3–14.5)
RBC # FLD: 14.7 % — HIGH (ref 10.3–14.5)
SODIUM SERPL-SCNC: 136 MMOL/L — SIGNIFICANT CHANGE UP (ref 135–145)
WBC # BLD: 5.31 K/UL — SIGNIFICANT CHANGE UP (ref 3.8–10.5)
WBC # BLD: 5.83 K/UL — SIGNIFICANT CHANGE UP (ref 3.8–10.5)
WBC # FLD AUTO: 5.31 K/UL — SIGNIFICANT CHANGE UP (ref 3.8–10.5)
WBC # FLD AUTO: 5.83 K/UL — SIGNIFICANT CHANGE UP (ref 3.8–10.5)

## 2025-03-02 PROCEDURE — 76604 US EXAM CHEST: CPT | Mod: 26

## 2025-03-02 PROCEDURE — 99292 CRITICAL CARE ADDL 30 MIN: CPT

## 2025-03-02 PROCEDURE — 99291 CRITICAL CARE FIRST HOUR: CPT | Mod: 25

## 2025-03-02 PROCEDURE — 99233 SBSQ HOSP IP/OBS HIGH 50: CPT

## 2025-03-02 PROCEDURE — 99292 CRITICAL CARE ADDL 30 MIN: CPT | Mod: 25

## 2025-03-02 PROCEDURE — 71045 X-RAY EXAM CHEST 1 VIEW: CPT | Mod: 26

## 2025-03-02 RX ORDER — BISACODYL 5 MG
10 TABLET, DELAYED RELEASE (ENTERIC COATED) ORAL ONCE
Refills: 0 | Status: COMPLETED | OUTPATIENT
Start: 2025-03-02 | End: 2025-03-02

## 2025-03-02 RX ADMIN — Medication 2 TABLET(S): at 22:12

## 2025-03-02 RX ADMIN — Medication 81 MILLIGRAM(S): at 12:19

## 2025-03-02 RX ADMIN — CLOPIDOGREL BISULFATE 75 MILLIGRAM(S): 75 TABLET, FILM COATED ORAL at 12:20

## 2025-03-02 RX ADMIN — INSULIN LISPRO 2 UNIT(S): 100 INJECTION, SOLUTION INTRAVENOUS; SUBCUTANEOUS at 07:33

## 2025-03-02 RX ADMIN — MUPIROCIN CALCIUM 1 APPLICATION(S): 20 CREAM TOPICAL at 17:22

## 2025-03-02 RX ADMIN — HEPARIN SODIUM 5000 UNIT(S): 1000 INJECTION INTRAVENOUS; SUBCUTANEOUS at 05:17

## 2025-03-02 RX ADMIN — MODAFINIL 100 MILLIGRAM(S): 200 TABLET ORAL at 12:20

## 2025-03-02 RX ADMIN — HEPARIN SODIUM 5000 UNIT(S): 1000 INJECTION INTRAVENOUS; SUBCUTANEOUS at 13:40

## 2025-03-02 RX ADMIN — Medication 667 MILLIGRAM(S): at 07:34

## 2025-03-02 RX ADMIN — HEPARIN SODIUM 5000 UNIT(S): 1000 INJECTION INTRAVENOUS; SUBCUTANEOUS at 22:12

## 2025-03-02 RX ADMIN — INSULIN LISPRO 2 UNIT(S): 100 INJECTION, SOLUTION INTRAVENOUS; SUBCUTANEOUS at 17:21

## 2025-03-02 RX ADMIN — TESTOSTERONE 100 MILLIGRAM(S): 5.5 GEL NASAL at 12:20

## 2025-03-02 RX ADMIN — POLYETHYLENE GLYCOL 3350 17 GRAM(S): 17 POWDER, FOR SOLUTION ORAL at 12:20

## 2025-03-02 RX ADMIN — INSULIN GLARGINE-YFGN 8 UNIT(S): 100 INJECTION, SOLUTION SUBCUTANEOUS at 22:29

## 2025-03-02 RX ADMIN — INSULIN LISPRO 2 UNIT(S): 100 INJECTION, SOLUTION INTRAVENOUS; SUBCUTANEOUS at 12:20

## 2025-03-02 RX ADMIN — Medication 1 APPLICATION(S): at 05:18

## 2025-03-02 RX ADMIN — GABAPENTIN 100 MILLIGRAM(S): 400 CAPSULE ORAL at 22:12

## 2025-03-02 RX ADMIN — Medication 667 MILLIGRAM(S): at 17:21

## 2025-03-02 RX ADMIN — Medication 40 MILLIGRAM(S): at 12:20

## 2025-03-02 RX ADMIN — GABAPENTIN 100 MILLIGRAM(S): 400 CAPSULE ORAL at 13:40

## 2025-03-02 RX ADMIN — INSULIN LISPRO 2: 100 INJECTION, SOLUTION INTRAVENOUS; SUBCUTANEOUS at 07:33

## 2025-03-02 RX ADMIN — Medication 100 MILLIGRAM(S): at 05:18

## 2025-03-02 RX ADMIN — LEVETIRACETAM 500 MILLIGRAM(S): 10 INJECTION, SOLUTION INTRAVENOUS at 17:21

## 2025-03-02 RX ADMIN — ATORVASTATIN CALCIUM 40 MILLIGRAM(S): 80 TABLET, FILM COATED ORAL at 22:12

## 2025-03-02 RX ADMIN — Medication 10 MILLIGRAM(S): at 05:39

## 2025-03-02 RX ADMIN — Medication 500 MILLIGRAM(S): at 17:21

## 2025-03-02 RX ADMIN — INSULIN LISPRO 4: 100 INJECTION, SOLUTION INTRAVENOUS; SUBCUTANEOUS at 12:21

## 2025-03-02 RX ADMIN — LEVETIRACETAM 500 MILLIGRAM(S): 10 INJECTION, SOLUTION INTRAVENOUS at 05:17

## 2025-03-02 RX ADMIN — Medication 667 MILLIGRAM(S): at 12:20

## 2025-03-02 RX ADMIN — GABAPENTIN 100 MILLIGRAM(S): 400 CAPSULE ORAL at 05:18

## 2025-03-02 RX ADMIN — Medication 500 MILLIGRAM(S): at 05:17

## 2025-03-02 RX ADMIN — MUPIROCIN CALCIUM 1 APPLICATION(S): 20 CREAM TOPICAL at 05:18

## 2025-03-02 NOTE — PROGRESS NOTE ADULT - SUBJECTIVE AND OBJECTIVE BOX
Point of care ultrasound performed      left mod small  pleural effusion noted  lung sliding present

## 2025-03-02 NOTE — PROGRESS NOTE ADULT - ASSESSMENT
84-year-old M with PMH of ESRD on HD TTS transferred to [facility] for CABG (2/24).  CABG performed on 2/26.  Nephrology following for ESRD management and postoperative volume management.    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 Will follow closely with you, please call with questions or concerns

## 2025-03-02 NOTE — PROGRESS NOTE ADULT - SUBJECTIVE AND OBJECTIVE BOX
CTICU  CRITICAL  CARE  attending     Hand off received 					   Pertinent clinical, laboratory, radiographic, hemodynamic, echocardiographic, respiratory data, microbiologic data and chart were reviewed and analyzed frequently throughout the course of the day and night        83 yo M with ESRD on HD TTS, coronary artery disease s/p PCI, Type 2 diabetes, hypertension, hyperlipidemia, benign prostate hypertrophy, bladder diverticulum, and chronic hip pain.  He presented to [facility] with angina.    He ruled in for NSTEMI.    He underwent a cardiac cath which showed 3v CAD.    He was started on heparin drip and transferred to [facility] for CABG.    S/P off pump CABG x 3.         FAMILY HISTORY:  Known health problems: none (Father, Mother)    PAST MEDICAL & SURGICAL HISTORY:  HTN (hypertension)  Gout  DM (diabetes mellitus)  History of BPH  HLD (hyperlipidemia)  CAD (coronary artery disease) one stent 6 yrs  End stage renal disease  History of cholecystectomy  S/P arteriovenous (AV) fistula creation 1 yr         14 system review was unremarkable    Vital signs, hemodynamic and respiratory parameters were reviewed from the bedside nursing flow sheet.  ICU Vital Signs Last 24 Hrs  T(C): 36.9 (02 Mar 2025 17:23), Max: 36.9 (02 Mar 2025 17:23)  T(F): 98.4 (02 Mar 2025 17:23), Max: 98.4 (02 Mar 2025 17:23)  HR: 85 (02 Mar 2025 19:00) (67 - 90)  BP: 135/63 (02 Mar 2025 03:00) (129/60 - 136/58)  BP(mean): 90 (02 Mar 2025 03:00) (84 - 91)  ABP: 131/49 (02 Mar 2025 19:00) (101/54 - 163/49)  ABP(mean): 71 (02 Mar 2025 19:00) (63 - 83)  RR: 18 (02 Mar 2025 19:00) (16 - 20)  SpO2: 96% (02 Mar 2025 19:00) (89% - 100%)    O2 Parameters below as of 02 Mar 2025 19:00  Patient On (Oxygen Delivery Method): nasal cannula, high flow  O2 Flow (L/min): 50  O2 Concentration (%): 60      Adult Advanced Hemodynamics Last 24 Hrs  CVP(mm Hg): 14 (02 Mar 2025 19:00) (7 - 88)  CVP(cm H2O): --  CO: --  CI: --  PA: --  PA(mean): --  PCWP: --  SVR: --  SVRI: --  PVR: --  PVRI: --, ABG - ( 02 Mar 2025 11:23 )  pH, Arterial: 7.42  pH, Blood: x     /  pCO2: 39    /  pO2: 91    / HCO3: 25    / Base Excess: 0.8   /  SaO2: 97.6                Intake and output was reviewed and the fluid balance was calculated  Daily     Daily Weight in k.9 (01 Mar 2025 20:01)  I&O's Summary    01 Mar 2025 07:01  -  02 Mar 2025 07:00  --------------------------------------------------------  IN: 926.4 mL / OUT: 2340 mL / NET: -1413.6 mL    02 Mar 2025 07:01  -  02 Mar 2025 19:41  --------------------------------------------------------  IN: 490 mL / OUT: 0 mL / NET: 490 mL            Neuro: No change in the mental status from the baseline.   Neck: No JVD.  CVS: S1, S2, No S3.  Lungs: Good air entry bilaterally.  Abd: Soft. No tenderness. + Bowel sounds.  Vascular: + DP/PT.  Extremities: No edema.  Lymphatic: Normal.  Skin: No abnormalities.            labs  CBC Full  -  ( 02 Mar 2025 11:04 )  WBC Count : 5.83 K/uL  RBC Count : 3.09 M/uL  Hemoglobin : 9.7 g/dL  Hematocrit : 30.2 %  Platelet Count - Automated : 131 K/uL  Mean Cell Volume : 97.7 fl  Mean Cell Hemoglobin : 31.4 pg  Mean Cell Hemoglobin Concentration : 32.1 g/dL  Auto Neutrophil # : x  Auto Lymphocyte # : x  Auto Monocyte # : x  Auto Eosinophil # : x  Auto Basophil # : x  Auto Neutrophil % : x  Auto Lymphocyte % : x  Auto Monocyte % : x  Auto Eosinophil % : x  Auto Basophil % : x        136  |  96  |  27[H]  ----------------------------<  196[H]  4.0   |  25  |  5.29[H]    Ca    9.4      02 Mar 2025 11:04  Phos  1.7     03-  Mg     2.2     -    TPro  6.3  /  Alb  3.3  /  TBili  0.4  /  DBili  x   /  AST  17  /  ALT  <5[L]  /  AlkPhos  132[H]  03-02    PT/INR - ( 02 Mar 2025 11:04 )   PT: 12.1 sec;   INR: 1.03          PTT - ( 02 Mar 2025 11:04 )  PTT:33.9 sec  The current medications were reviewed   MEDICATIONS  (STANDING):  ascorbic acid 500 milliGRAM(s) Oral two times a day  aspirin enteric coated 81 milliGRAM(s) Oral daily  atorvastatin 40 milliGRAM(s) Oral at bedtime  ceFAZolin   IVPB 1000 milliGRAM(s) IV Intermittent every 24 hours  chlorhexidine 2% Cloths 1 Application(s) Topical <User Schedule>  clopidogrel Tablet 75 milliGRAM(s) Oral daily  dextrose 5%. 1000 milliLiter(s) (50 mL/Hr) IV Continuous <Continuous>  dextrose 5%. 1000 milliLiter(s) (100 mL/Hr) IV Continuous <Continuous>  dextrose 50% Injectable 50 milliLiter(s) IV Push every 15 minutes  dextrose 50% Injectable 25 milliLiter(s) IV Push every 15 minutes  gabapentin 100 milliGRAM(s) Oral every 8 hours  heparin   Injectable 5000 Unit(s) SubCutaneous every 8 hours  influenza  Vaccine (HIGH DOSE) 0.5 milliLiter(s) IntraMuscular once  insulin glargine Injectable (LANTUS) 8 Unit(s) SubCutaneous at bedtime  insulin lispro (ADMELOG) corrective regimen sliding scale   SubCutaneous three times a day before meals  insulin lispro (ADMELOG) corrective regimen sliding scale   SubCutaneous at bedtime  insulin lispro Injectable (ADMELOG) 2 Unit(s) SubCutaneous three times a day before meals  levETIRAcetam 500 milliGRAM(s) Oral every 12 hours  midodrine 10 milliGRAM(s) Oral every 8 hours  modafinil 100 milliGRAM(s) Oral daily  mupirocin 2% Ointment 1 Application(s) Both Nostrils two times a day  pantoprazole    Tablet 40 milliGRAM(s) Oral daily  polyethylene glycol 3350 17 Gram(s) Oral daily  senna 2 Tablet(s) Oral at bedtime  sodium chloride 0.9%. 1000 milliLiter(s) (10 mL/Hr) IV Continuous <Continuous>  testosterone 1% Gel 100 milliGRAM(s) Topical daily    MEDICATIONS  (PRN):  dextrose Oral Gel 15 Gram(s) Oral once PRN Blood Glucose LESS THAN 70 milliGRAM(s)/deciliter          84 year old  Male admitted with Coronary Artery Disease   S/P Off pump CABG x 2 (LIMA to LAD, SVG to OM2).   Arrived to ICU intubated.  Postoperative course complicated by encephalopathy, vaso plegia, posthemorrhagic anemia requiring vasopressor support.  He was extubated day number one.  Hemodynamically stable.  Fair oxygenation.  Hemodialysis two liters yesterday.  Hypophosphatemia.          My plan includes :  OPTIMIZE Glycemic control   Next HD 3/4/25  Statin Rx.  WEAN off midodrine  Hold phosphate binder temporarily.  Dual antiplatelet Rx.  Levetiracetam for seizure prophylaxis.   Close hemodynamic monitoring   Monitor for arrhythmias and monitor parameters for organ perfusion  Monitor neurologic status  Monitor renal function.  Head of the bed should remain elevated to 45 deg .   Chest PT and IS will be encouraged  Monitor adequacy of oxygenation   Nutritional goals will be met using po eventually , ensure adequate caloric intake and monitor the same  Stress ulcer and VTE prophylaxis will be achieved    Electrolytes have been repleted as necessary and wound care has been carried out. Pain control has been achieved.   Aggressive physical therapy and early mobility and ambulation goals will be met   The family was updated about the course and plan  CRITICAL CARE TIME SPENT in evaluation and management, reassessments, review and interpretation of labs and x-rays, hemodynamic management, formulating a plan and coordinating care: ___30____ MIN.  Time does not include procedural time.  CTICU ATTENDING     					    Guy Geller MD

## 2025-03-02 NOTE — PROGRESS NOTE ADULT - SUBJECTIVE AND OBJECTIVE BOX
CTICU  CRITICAL  CARE  attending     Hand off received 	    CHIEF COMPLAINT :    s/p ohs       				   Pertinent clinical, laboratory, radiographic, hemodynamic, echocardiographic, respiratory data, microbiologic data and chart were reviewed and analyzed frequently throughout the course of the day and night  Patient seen and examined with CTS/ SH attending at bedside    INTERVAL HISTORY    uneventful hs   Pt is a 84y , Male, HEALTH ISSUES - PROBLEM Dx:      , FAMILY HISTORY:  Known health problems: none (Father, Mother)    PAST MEDICAL & SURGICAL HISTORY:  HTN (hypertension)      Gout      DM (diabetes mellitus)      History of BPH      HLD (hyperlipidemia)      CAD (coronary artery disease)  one stent 6 yrs      End stage renal disease      History of cholecystectomy      S/P arteriovenous (AV) fistula creation  1 yr ago        Patient is a 84y old  Male who presents with a chief complaint of Coronary Artery Disease (02 Mar 2025 12:05)      14 system review was unremarkable    Vital signs, hemodynamic and respiratory parameters were reviewed from the bedside nursing flowsheet.  ICU Vital Signs Last 24 Hrs  T(C): 36.6 (02 Mar 2025 09:00), Max: 36.6 (02 Mar 2025 05:17)  T(F): 97.9 (02 Mar 2025 09:00), Max: 97.9 (02 Mar 2025 05:17)  HR: 80 (02 Mar 2025 14:00) (67 - 90)  BP: 135/63 (02 Mar 2025 03:00) (129/60 - 136/58)  BP(mean): 90 (02 Mar 2025 03:00) (84 - 91)  ABP: 149/47 (02 Mar 2025 14:00) (122/49 - 181/49)  ABP(mean): 73 (02 Mar 2025 14:00) (63 - 83)  RR: 18 (02 Mar 2025 14:00) (16 - 20)  SpO2: 98% (02 Mar 2025 14:00) (91% - 100%)    O2 Parameters below as of 02 Mar 2025 14:00  Patient On (Oxygen Delivery Method): nasal cannula, high flow  O2 Flow (L/min): 50  O2 Concentration (%): 60      Adult Advanced Hemodynamics Last 24 Hrs  CVP(mm Hg): 14 (02 Mar 2025 14:00) (14 - 91)  CVP(cm H2O): --  CO: --  CI: --  PA: --  PA(mean): --  PCWP: --  SVR: --  SVRI: --  PVR: --  PVRI: --, ABG - ( 02 Mar 2025 11:23 )  pH, Arterial: 7.42  pH, Blood: x     /  pCO2: 39    /  pO2: 91    / HCO3: 25    / Base Excess: 0.8   /  SaO2: 97.6                Intake and output was reviewed and the fluid balance was calculated  Daily     Daily Weight in k.9 (01 Mar 2025 20:01)  I&O's Summary    01 Mar 2025 07:01  -  02 Mar 2025 07:00  --------------------------------------------------------  IN: 926.4 mL / OUT: 2340 mL / NET: -1413.6 mL    02 Mar 2025 07:01  -  02 Mar 2025 14:15  --------------------------------------------------------  IN: 490 mL / OUT: 0 mL / NET: 490 mL        All lines and drain sites were assessed  Glycemic trend was reviewedCAPILLARY BLOOD GLUCOSE      POCT Blood Glucose.: 205 mg/dL (02 Mar 2025 12:12)    No acute change in mental status  Auscultation of the chest reveals equal bs  Abdomen is soft  Extremities are warm and well perfused  Wounds appear clean and unremarkable  Antibiotics are periop    labs  CBC Full  -  ( 02 Mar 2025 11:04 )  WBC Count : 5.83 K/uL  RBC Count : 3.09 M/uL  Hemoglobin : 9.7 g/dL  Hematocrit : 30.2 %  Platelet Count - Automated : 131 K/uL  Mean Cell Volume : 97.7 fl  Mean Cell Hemoglobin : 31.4 pg  Mean Cell Hemoglobin Concentration : 32.1 g/dL  Auto Neutrophil # : x  Auto Lymphocyte # : x  Auto Monocyte # : x  Auto Eosinophil # : x  Auto Basophil # : x  Auto Neutrophil % : x  Auto Lymphocyte % : x  Auto Monocyte % : x  Auto Eosinophil % : x  Auto Basophil % : x        136  |  96  |  27[H]  ----------------------------<  196[H]  4.0   |  25  |  5.29[H]    Ca    9.4      02 Mar 2025 11:04  Phos  1.7     03-  Mg     2.2     03-    TPro  6.3  /  Alb  3.3  /  TBili  0.4  /  DBili  x   /  AST  17  /  ALT  <5[L]  /  AlkPhos  132[H]  03-02    PT/INR - ( 02 Mar 2025 11:04 )   PT: 12.1 sec;   INR: 1.03          PTT - ( 02 Mar 2025 11:04 )  PTT:33.9 sec  The current medications were reviewed   MEDICATIONS  (STANDING):  ascorbic acid 500 milliGRAM(s) Oral two times a day  aspirin enteric coated 81 milliGRAM(s) Oral daily  atorvastatin 40 milliGRAM(s) Oral at bedtime  calcium acetate 667 milliGRAM(s) Oral three times a day with meals  ceFAZolin   IVPB 1000 milliGRAM(s) IV Intermittent every 24 hours  chlorhexidine 2% Cloths 1 Application(s) Topical <User Schedule>  clopidogrel Tablet 75 milliGRAM(s) Oral daily  dextrose 5%. 1000 milliLiter(s) (50 mL/Hr) IV Continuous <Continuous>  dextrose 5%. 1000 milliLiter(s) (100 mL/Hr) IV Continuous <Continuous>  dextrose 50% Injectable 50 milliLiter(s) IV Push every 15 minutes  dextrose 50% Injectable 25 milliLiter(s) IV Push every 15 minutes  gabapentin 100 milliGRAM(s) Oral every 8 hours  heparin   Injectable 5000 Unit(s) SubCutaneous every 8 hours  influenza  Vaccine (HIGH DOSE) 0.5 milliLiter(s) IntraMuscular once  insulin glargine Injectable (LANTUS) 8 Unit(s) SubCutaneous at bedtime  insulin lispro (ADMELOG) corrective regimen sliding scale   SubCutaneous three times a day before meals  insulin lispro (ADMELOG) corrective regimen sliding scale   SubCutaneous at bedtime  insulin lispro Injectable (ADMELOG) 2 Unit(s) SubCutaneous three times a day before meals  levETIRAcetam 500 milliGRAM(s) Oral every 12 hours  midodrine 10 milliGRAM(s) Oral every 8 hours  modafinil 100 milliGRAM(s) Oral daily  mupirocin 2% Ointment 1 Application(s) Both Nostrils two times a day  pantoprazole    Tablet 40 milliGRAM(s) Oral daily  polyethylene glycol 3350 17 Gram(s) Oral daily  senna 2 Tablet(s) Oral at bedtime  sodium chloride 0.9%. 1000 milliLiter(s) (10 mL/Hr) IV Continuous <Continuous>  testosterone 1% Gel 100 milliGRAM(s) Topical daily    MEDICATIONS  (PRN):  acetaminophen     Tablet .. 975 milliGRAM(s) Oral every 6 hours PRN Mild Pain (1 - 3)  dextrose Oral Gel 15 Gram(s) Oral once PRN Blood Glucose LESS THAN 70 milliGRAM(s)/deciliter       PROBLEM LIST/ ASSESSMENT:  HEALTH ISSUES - PROBLEM Dx:      ,   Patient is a 84y old  Male who presents with a chief complaint of Coronary Artery Disease (02 Mar 2025 12:05)     s/p cardiac surgery      Acute blood loss anemia with relative hypotension treated with > 1 unit PC    Acute post operative pulmonary insufficiency ruled in due to hypoxemia, O2 sats < 91% on RA treated with HFNC    Toxic metabolic encephalopathy ;  due to anesthesia pain medications    ESRD          My plan includes :    remove cvl     wean hjfnc as tolerated   pt    left pleural pocus reveals 300 ml effn =- may need drainage  close hemodynamic, ventilatory and drain monitoring and management per post op routine    Monitor for arrhythmias and monitor parameters for organ perfusion  beta blockade not administered due to hemodynamic instability and bradycardia  monitor neurologic status  Head of the bed should remain elevated to 45 deg .   chest PT and IS will be encouraged  monitor adequacy of oxygenation and ventilation and attempt to wean oxygen  antibiotic regimen will be tailored to the clinical, laboratory and microbiologic data  Nutritional goals will be met using po eventually , ensure adequate caloric intake and montior the same  Stress ulcer and VTE prophylaxis will be achieved    Glycemic control is satisfactory  Electrolytes have been repleted as necessary and wound care has been carried out. Pain control has been achieved.   agressive physical therapy and early mobility and ambulation goals will be met   The family was updated about the course and plan  CRITICAL CARE TIME Upon my evaluation, this patient had a high probability of imminent or life-threatening deterioration due to the above problems which required my direct attention, intervention, and personal management.  I have personally provided 110 minutes of critical care time exclusive of time spent on separately billable procedures. Time included review of laboratory data, radiology results, discussion with consultants, and monitoring for potential decompensation. Interventions were performed as documented abovepersonally provided by me  in evaluation and management, reassessments, review and interpretation of labs and x-rays, ventilator and hemodynamic management, formulating a plan and coordinating care. Time spent was non routine post-operarive caRE and included multiple and repeated evaluations at the bedside  Time does not include procedural time.    CTICU ATTENDING     					    Israel Rasmussen MD

## 2025-03-02 NOTE — PROGRESS NOTE ADULT - SUBJECTIVE AND OBJECTIVE BOX
NEPHROLOGY PROGRESS NOTE    Subjective: Patient seen and examined at bedside. Improved respiration, feels well, no complaints.     [OBJECTIVE]:    Vital Signs:  T(F): , Max: 98.1 (03-01-25 @ 14:00)  HR:  (67 - 90)  BP:  (129/60 - 136/58)  BP(mean):  (84 - 91)  ABP: 151/48 (03-02-25 @ 11:00) (122/49 - 181/49)  ABP(mean):  (63 - 83)  RR:  (16 - 20)  SpO2:  (91% - 100%)  CVP(mm Hg): 27 (03-02-25 @ 11:00) (15 - 91)  CVP(cm H2O): --      03-01 @ 07:01  -  03-02 @ 07:00  --------------------------------------------------------  IN: 926.4 mL / OUT: 2340 mL / NET: -1413.6 mL    03-02 @ 07:01  -  03-02 @ 12:06  --------------------------------------------------------  IN: 250 mL / OUT: 0 mL / NET: 250 mL      CAPILLARY BLOOD GLUCOSE      POCT Blood Glucose.: 155 mg/dL (02 Mar 2025 07:26)      Physical Exam:  T(F): 97.9 (03-02-25 @ 09:00)  HR: 84 (03-02-25 @ 11:00)  BP: 135/63 (03-02-25 @ 03:00)  RR: 18 (03-02-25 @ 11:00)  SpO2: 97% (03-02-25 @ 11:00)  Wt(kg): --    Constitutional: Resting comfortably in chair; NAD  HEENT:  EOMI, anicteric sclera; MMM  Respiratory: on oxygen, mild accessory muscle use  Cardiac: +S1/S2; RRR; no M/R/G  Gastrointestinal: soft, NT/ND; no rebound or guarding; +BS  Extremities: WWP, no clubbing or cyanosis; no peripheral edema  Dermatologic: skin warm, dry and intact; no rashes, wounds, or scars  Access:  RUE AVF    Medications:  MEDICATIONS  (STANDING):  ascorbic acid 500 milliGRAM(s) Oral two times a day  aspirin enteric coated 81 milliGRAM(s) Oral daily  atorvastatin 40 milliGRAM(s) Oral at bedtime  calcium acetate 667 milliGRAM(s) Oral three times a day with meals  ceFAZolin   IVPB 1000 milliGRAM(s) IV Intermittent every 24 hours  chlorhexidine 2% Cloths 1 Application(s) Topical <User Schedule>  clopidogrel Tablet 75 milliGRAM(s) Oral daily  dextrose 5%. 1000 milliLiter(s) (50 mL/Hr) IV Continuous <Continuous>  dextrose 5%. 1000 milliLiter(s) (100 mL/Hr) IV Continuous <Continuous>  dextrose 50% Injectable 50 milliLiter(s) IV Push every 15 minutes  dextrose 50% Injectable 25 milliLiter(s) IV Push every 15 minutes  gabapentin 100 milliGRAM(s) Oral every 8 hours  heparin   Injectable 5000 Unit(s) SubCutaneous every 8 hours  influenza  Vaccine (HIGH DOSE) 0.5 milliLiter(s) IntraMuscular once  insulin glargine Injectable (LANTUS) 8 Unit(s) SubCutaneous at bedtime  insulin lispro (ADMELOG) corrective regimen sliding scale   SubCutaneous three times a day before meals  insulin lispro (ADMELOG) corrective regimen sliding scale   SubCutaneous at bedtime  insulin lispro Injectable (ADMELOG) 2 Unit(s) SubCutaneous three times a day before meals  levETIRAcetam 500 milliGRAM(s) Oral every 12 hours  midodrine 10 milliGRAM(s) Oral every 8 hours  modafinil 100 milliGRAM(s) Oral daily  mupirocin 2% Ointment 1 Application(s) Both Nostrils two times a day  pantoprazole    Tablet 40 milliGRAM(s) Oral daily  polyethylene glycol 3350 17 Gram(s) Oral daily  senna 2 Tablet(s) Oral at bedtime  sodium chloride 0.9%. 1000 milliLiter(s) (10 mL/Hr) IV Continuous <Continuous>  testosterone 1% Gel 100 milliGRAM(s) Topical daily    MEDICATIONS  (PRN):  acetaminophen     Tablet .. 975 milliGRAM(s) Oral every 6 hours PRN Mild Pain (1 - 3)  dextrose Oral Gel 15 Gram(s) Oral once PRN Blood Glucose LESS THAN 70 milliGRAM(s)/deciliter      Allergies:  Allergies    No Known Allergies    Intolerances        Labs:                        9.7    5.83  )-----------( 131      ( 02 Mar 2025 11:04 )             30.2     03-02    136  |  96  |  27[H]  ----------------------------<  196[H]  4.0   |  25  |  5.29[H]    Ca    9.4      02 Mar 2025 11:04  Phos  1.7     03-02  Mg     2.2     03-02    TPro  6.3  /  Alb  3.3  /  TBili  0.4  /  DBili  x   /  AST  17  /  ALT  <5[L]  /  AlkPhos  132[H]  03-02    PT/INR - ( 02 Mar 2025 11:04 )   PT: 12.1 sec;   INR: 1.03          PTT - ( 02 Mar 2025 11:04 )  PTT:33.9 sec  Urinalysis Basic - ( 02 Mar 2025 11:04 )    Color: x / Appearance: x / SG: x / pH: x  Gluc: 196 mg/dL / Ketone: x  / Bili: x / Urobili: x   Blood: x / Protein: x / Nitrite: x   Leuk Esterase: x / RBC: x / WBC x   Sq Epi: x / Non Sq Epi: x / Bacteria: x        Radiology and other tests:

## 2025-03-03 LAB
ALBUMIN SERPL ELPH-MCNC: 2.8 G/DL — LOW (ref 3.3–5)
ALP SERPL-CCNC: 129 U/L — HIGH (ref 40–120)
ALT FLD-CCNC: <5 U/L — LOW (ref 10–45)
ANION GAP SERPL CALC-SCNC: 14 MMOL/L — SIGNIFICANT CHANGE UP (ref 5–17)
APTT BLD: 31.2 SEC — SIGNIFICANT CHANGE UP (ref 24.5–35.6)
AST SERPL-CCNC: 15 U/L — SIGNIFICANT CHANGE UP (ref 10–40)
BASOPHILS # BLD AUTO: 0.02 K/UL — SIGNIFICANT CHANGE UP (ref 0–0.2)
BASOPHILS NFR BLD AUTO: 0.4 % — SIGNIFICANT CHANGE UP (ref 0–2)
BILIRUB SERPL-MCNC: 0.4 MG/DL — SIGNIFICANT CHANGE UP (ref 0.2–1.2)
BUN SERPL-MCNC: 32 MG/DL — HIGH (ref 7–23)
CALCIUM SERPL-MCNC: 8.7 MG/DL — SIGNIFICANT CHANGE UP (ref 8.4–10.5)
CHLORIDE SERPL-SCNC: 91 MMOL/L — LOW (ref 96–108)
CO2 SERPL-SCNC: 26 MMOL/L — SIGNIFICANT CHANGE UP (ref 22–31)
CREAT SERPL-MCNC: 6.36 MG/DL — HIGH (ref 0.5–1.3)
EGFR: 8 ML/MIN/1.73M2 — LOW
EGFR: 8 ML/MIN/1.73M2 — LOW
EOSINOPHIL # BLD AUTO: 0.26 K/UL — SIGNIFICANT CHANGE UP (ref 0–0.5)
EOSINOPHIL NFR BLD AUTO: 5.2 % — SIGNIFICANT CHANGE UP (ref 0–6)
GAS PNL BLDA: SIGNIFICANT CHANGE UP
GLUCOSE SERPL-MCNC: 123 MG/DL — HIGH (ref 70–99)
HAV IGM SER-ACNC: SIGNIFICANT CHANGE UP
HBV CORE AB SER-ACNC: SIGNIFICANT CHANGE UP
HBV CORE IGM SER-ACNC: SIGNIFICANT CHANGE UP
HBV SURFACE AB SER-ACNC: REACTIVE — SIGNIFICANT CHANGE UP
HBV SURFACE AG SER-ACNC: SIGNIFICANT CHANGE UP
HCT VFR BLD CALC: 27.5 % — LOW (ref 39–50)
HCV AB S/CO SERPL IA: 0.06 S/CO — SIGNIFICANT CHANGE UP
HCV AB SERPL-IMP: SIGNIFICANT CHANGE UP
HGB BLD-MCNC: 8.9 G/DL — LOW (ref 13–17)
IMM GRANULOCYTES NFR BLD AUTO: 1 % — HIGH (ref 0–0.9)
INR BLD: 1.03 — SIGNIFICANT CHANGE UP (ref 0.85–1.16)
LYMPHOCYTES # BLD AUTO: 1.5 K/UL — SIGNIFICANT CHANGE UP (ref 1–3.3)
LYMPHOCYTES # BLD AUTO: 29.8 % — SIGNIFICANT CHANGE UP (ref 13–44)
MAGNESIUM SERPL-MCNC: 2.4 MG/DL — SIGNIFICANT CHANGE UP (ref 1.6–2.6)
MCHC RBC-ENTMCNC: 31.4 PG — SIGNIFICANT CHANGE UP (ref 27–34)
MCHC RBC-ENTMCNC: 32.4 G/DL — SIGNIFICANT CHANGE UP (ref 32–36)
MCV RBC AUTO: 97.2 FL — SIGNIFICANT CHANGE UP (ref 80–100)
MONOCYTES # BLD AUTO: 0.75 K/UL — SIGNIFICANT CHANGE UP (ref 0–0.9)
MONOCYTES NFR BLD AUTO: 14.9 % — HIGH (ref 2–14)
NEUTROPHILS # BLD AUTO: 2.45 K/UL — SIGNIFICANT CHANGE UP (ref 1.8–7.4)
NEUTROPHILS NFR BLD AUTO: 48.7 % — SIGNIFICANT CHANGE UP (ref 43–77)
NRBC BLD AUTO-RTO: 0 /100 WBCS — SIGNIFICANT CHANGE UP (ref 0–0)
PHOSPHATE SERPL-MCNC: 2.1 MG/DL — LOW (ref 2.5–4.5)
PLATELET # BLD AUTO: 128 K/UL — LOW (ref 150–400)
POTASSIUM SERPL-MCNC: 3.9 MMOL/L — SIGNIFICANT CHANGE UP (ref 3.5–5.3)
POTASSIUM SERPL-SCNC: 3.9 MMOL/L — SIGNIFICANT CHANGE UP (ref 3.5–5.3)
PROT SERPL-MCNC: 5.9 G/DL — LOW (ref 6–8.3)
PROTHROM AB SERPL-ACNC: 12.1 SEC — SIGNIFICANT CHANGE UP (ref 9.9–13.4)
RBC # BLD: 2.83 M/UL — LOW (ref 4.2–5.8)
RBC # FLD: 14.7 % — HIGH (ref 10.3–14.5)
SODIUM SERPL-SCNC: 131 MMOL/L — LOW (ref 135–145)
WBC # BLD: 5.03 K/UL — SIGNIFICANT CHANGE UP (ref 3.8–10.5)
WBC # FLD AUTO: 5.03 K/UL — SIGNIFICANT CHANGE UP (ref 3.8–10.5)

## 2025-03-03 PROCEDURE — 99292 CRITICAL CARE ADDL 30 MIN: CPT

## 2025-03-03 PROCEDURE — 99291 CRITICAL CARE FIRST HOUR: CPT | Mod: 25

## 2025-03-03 PROCEDURE — 76604 US EXAM CHEST: CPT | Mod: 26

## 2025-03-03 PROCEDURE — 71045 X-RAY EXAM CHEST 1 VIEW: CPT | Mod: 26,76

## 2025-03-03 PROCEDURE — 99232 SBSQ HOSP IP/OBS MODERATE 35: CPT

## 2025-03-03 RX ORDER — GABAPENTIN 400 MG/1
100 CAPSULE ORAL DAILY
Refills: 0 | Status: DISCONTINUED | OUTPATIENT
Start: 2025-03-03 | End: 2025-03-04

## 2025-03-03 RX ORDER — ACETAMINOPHEN 500 MG/5ML
650 LIQUID (ML) ORAL EVERY 6 HOURS
Refills: 0 | Status: DISCONTINUED | OUTPATIENT
Start: 2025-03-03 | End: 2025-03-10

## 2025-03-03 RX ORDER — OXYCODONE HYDROCHLORIDE AND ACETAMINOPHEN 10; 325 MG/1; MG/1
1 TABLET ORAL EVERY 6 HOURS
Refills: 0 | Status: DISCONTINUED | OUTPATIENT
Start: 2025-03-03 | End: 2025-03-04

## 2025-03-03 RX ORDER — METOPROLOL SUCCINATE 50 MG/1
25 TABLET, EXTENDED RELEASE ORAL
Refills: 0 | Status: DISCONTINUED | OUTPATIENT
Start: 2025-03-03 | End: 2025-03-04

## 2025-03-03 RX ADMIN — INSULIN LISPRO 2 UNIT(S): 100 INJECTION, SOLUTION INTRAVENOUS; SUBCUTANEOUS at 07:43

## 2025-03-03 RX ADMIN — Medication 500 MILLIGRAM(S): at 05:21

## 2025-03-03 RX ADMIN — METOPROLOL SUCCINATE 25 MILLIGRAM(S): 50 TABLET, EXTENDED RELEASE ORAL at 19:24

## 2025-03-03 RX ADMIN — Medication 5 UNIT(S): at 13:33

## 2025-03-03 RX ADMIN — Medication 100 MILLIGRAM(S): at 05:20

## 2025-03-03 RX ADMIN — CLOPIDOGREL BISULFATE 75 MILLIGRAM(S): 75 TABLET, FILM COATED ORAL at 11:00

## 2025-03-03 RX ADMIN — Medication 650 MILLIGRAM(S): at 22:46

## 2025-03-03 RX ADMIN — LEVETIRACETAM 500 MILLIGRAM(S): 10 INJECTION, SOLUTION INTRAVENOUS at 05:20

## 2025-03-03 RX ADMIN — INSULIN LISPRO 2 UNIT(S): 100 INJECTION, SOLUTION INTRAVENOUS; SUBCUTANEOUS at 10:57

## 2025-03-03 RX ADMIN — MUPIROCIN CALCIUM 1 APPLICATION(S): 20 CREAM TOPICAL at 05:20

## 2025-03-03 RX ADMIN — Medication 650 MILLIGRAM(S): at 23:00

## 2025-03-03 RX ADMIN — INSULIN GLARGINE-YFGN 8 UNIT(S): 100 INJECTION, SOLUTION SUBCUTANEOUS at 21:30

## 2025-03-03 RX ADMIN — POLYETHYLENE GLYCOL 3350 17 GRAM(S): 17 POWDER, FOR SOLUTION ORAL at 11:00

## 2025-03-03 RX ADMIN — INSULIN LISPRO 2 UNIT(S): 100 INJECTION, SOLUTION INTRAVENOUS; SUBCUTANEOUS at 16:11

## 2025-03-03 RX ADMIN — LEVETIRACETAM 500 MILLIGRAM(S): 10 INJECTION, SOLUTION INTRAVENOUS at 19:24

## 2025-03-03 RX ADMIN — GABAPENTIN 100 MILLIGRAM(S): 400 CAPSULE ORAL at 12:23

## 2025-03-03 RX ADMIN — INSULIN LISPRO 2: 100 INJECTION, SOLUTION INTRAVENOUS; SUBCUTANEOUS at 07:44

## 2025-03-03 RX ADMIN — Medication 1 APPLICATION(S): at 05:21

## 2025-03-03 RX ADMIN — HEPARIN SODIUM 5000 UNIT(S): 1000 INJECTION INTRAVENOUS; SUBCUTANEOUS at 21:29

## 2025-03-03 RX ADMIN — Medication 40 MILLIGRAM(S): at 11:00

## 2025-03-03 RX ADMIN — HEPARIN SODIUM 5000 UNIT(S): 1000 INJECTION INTRAVENOUS; SUBCUTANEOUS at 13:10

## 2025-03-03 RX ADMIN — HEPARIN SODIUM 5000 UNIT(S): 1000 INJECTION INTRAVENOUS; SUBCUTANEOUS at 05:20

## 2025-03-03 RX ADMIN — TESTOSTERONE 100 MILLIGRAM(S): 5.5 GEL NASAL at 11:00

## 2025-03-03 RX ADMIN — ATORVASTATIN CALCIUM 40 MILLIGRAM(S): 80 TABLET, FILM COATED ORAL at 21:29

## 2025-03-03 RX ADMIN — METOPROLOL SUCCINATE 25 MILLIGRAM(S): 50 TABLET, EXTENDED RELEASE ORAL at 07:43

## 2025-03-03 RX ADMIN — MODAFINIL 100 MILLIGRAM(S): 200 TABLET ORAL at 11:01

## 2025-03-03 RX ADMIN — GABAPENTIN 100 MILLIGRAM(S): 400 CAPSULE ORAL at 05:20

## 2025-03-03 RX ADMIN — Medication 81 MILLIGRAM(S): at 11:01

## 2025-03-03 RX ADMIN — INSULIN LISPRO 2: 100 INJECTION, SOLUTION INTRAVENOUS; SUBCUTANEOUS at 10:57

## 2025-03-03 NOTE — PROGRESS NOTE ADULT - ASSESSMENT
84-year-old M with PMH of ESRD on HD TTS transferred to [facility] for CABG (2/24).  CABG performed on 2/26.  Nephrology following for ESRD management and postoperative volume management.    1-ESRD on HD TTS s/p CABG:  Last HD on 1/3 for volume optimization  – HD today to help with volume optimization:  Duration: 210 minutes, target fluid removal: 2 L, dialysate electrolytes: Potassium 3, calcium 2.5, sodium 138, bicarbonate 35  –Strict I/O monitoring  –Access: RUE AVF    2-BP/Volume management: SBP in the 150s   –Consider starting carvedilol 6.25mg twice daily    Thank you for consulting Nephrology.    Telly Ramirez MD  PGY-4 Nephrology Fellow 84-year-old M with PMH of ESRD on HD TTS transferred to [facility] for CABG (2/24).  CABG performed on 2/26.  Nephrology following for ESRD management and postoperative volume management.    1-ESRD on HD TTS s/p CABG:  Last HD on 3/1 for volume optimization  – HD today to help with volume optimization:  Duration: 210 minutes, target fluid removal: 2 L, dialysate electrolytes: Potassium 3, calcium 2.5, sodium 138, bicarbonate 35  –Strict I/O monitoring  –Access: RUE AVF    2-BP/Volume management: SBP in the 150s   –Consider starting carvedilol 6.25mg twice daily    Thank you for consulting Nephrology.    Telly Ramirez MD  PGY-4 Nephrology Fellow

## 2025-03-03 NOTE — PROGRESS NOTE ADULT - SUBJECTIVE AND OBJECTIVE BOX
CTICU  CRITICAL  CARE  attending     Hand off received 					   Pertinent clinical, laboratory, radiographic, hemodynamic, echocardiographic, respiratory data, microbiologic data and chart were reviewed and analyzed frequently throughout the course of the day and night        84 year old male with ESRD on HD TTS, coronary artery disease s/p PCI, Type 2 diabetes, hypertension, hyperlipidemia, benign prostate hypertrophy, bladder diverticulum, and chronic hip pain.  He presented to [facility] with angina.    He ruled in for NSTEMI.    He underwent a cardiac cath which showed 3v CAD.    He was started on heparin drip and transferred to [facility] for CABG.    S/P off pump CABG x 3.         FAMILY HISTORY:  Known health problems: none (Father, Mother)    PAST MEDICAL & SURGICAL HISTORY:  HTN (hypertension)  Gout  DM (diabetes mellitus)  History of BPH  HLD (hyperlipidemia)  CAD (coronary artery disease) one stent 6 yrs  End stage renal disease  History of cholecystectomy  S/P arteriovenous (AV) fistula creation 1 yr           14 system review was unremarkable    Vital signs, hemodynamic and respiratory parameters were reviewed from the bedside nursing flow sheet.  ICU Vital Signs Last 24 Hrs  T(C): 36.6 (03 Mar 2025 22:37), Max: 36.7 (03 Mar 2025 15:45)  T(F): 97.9 (03 Mar 2025 22:37), Max: 98.1 (03 Mar 2025 15:45)  HR: 76 (03 Mar 2025 22:00) (68 - 84)  BP: 151/67 (03 Mar 2025 22:00) (122/54 - 178/74)  BP(mean): 97 (03 Mar 2025 22:00) (78 - 110)  ABP: 145/49 (03 Mar 2025 07:15) (132/48 - 165/52)  ABP(mean): 74 (03 Mar 2025 07:15) (69 - 83)  RR: 15 (03 Mar 2025 22:00) (15 - 21)  SpO2: 96% (03 Mar 2025 22:00) (92% - 100%)    O2 Parameters below as of 03 Mar 2025 23:00  Patient On (Oxygen Delivery Method): nasal cannula w/ humidification  O2 Flow (L/min): 2        Adult Advanced Hemodynamics Last 24 Hrs  CVP(mm Hg): 13 (03 Mar 2025 05:00) (13 - 17)  CVP(cm H2O): --  CO: --  CI: --  PA: --  PA(mean): --  PCWP: --  SVR: --  SVRI: --  PVR: --  PVRI: --, ABG - ( 03 Mar 2025 03:28 )  pH, Arterial: 7.41  pH, Blood: x     /  pCO2: 43    /  pO2: 105   / HCO3: 27    / Base Excess: 2.2   /  SaO2: 98.6                Intake and output was reviewed and the fluid balance was calculated  Daily     Daily   I&O's Summary    02 Mar 2025 07:01  -  03 Mar 2025 07:00  --------------------------------------------------------  IN: 780 mL / OUT: 2 mL / NET: 778 mL    03 Mar 2025 07:01  -  03 Mar 2025 23:02  --------------------------------------------------------  IN: 600 mL / OUT: 0 mL / NET: 600 mL        Neuro: No change in the mental status from the baseline.   Neck: No JVD.  CVS: S1, S2, No S3.  Lungs: Good air entry bilaterally.  Abd: Soft. No tenderness. + Bowel sounds.  Vascular: + DP/PT.  Extremities: No edema.  Lymphatic: Normal.  Skin: No abnormalities            labs  CBC Full  -  ( 03 Mar 2025 03:32 )  WBC Count : 5.03 K/uL  RBC Count : 2.83 M/uL  Hemoglobin : 8.9 g/dL  Hematocrit : 27.5 %  Platelet Count - Automated : 128 K/uL  Mean Cell Volume : 97.2 fl  Mean Cell Hemoglobin : 31.4 pg  Mean Cell Hemoglobin Concentration : 32.4 g/dL  Auto Neutrophil # : x  Auto Lymphocyte # : x  Auto Monocyte # : x  Auto Eosinophil # : x  Auto Basophil # : x  Auto Neutrophil % : x  Auto Lymphocyte % : x  Auto Monocyte % : x  Auto Eosinophil % : x  Auto Basophil % : x    03-03    131[L]  |  91[L]  |  32[H]  ----------------------------<  123[H]  3.9   |  26  |  6.36[H]    Ca    8.7      03 Mar 2025 03:32  Phos  2.1     03-03  Mg     2.4     03-03    TPro  5.9[L]  /  Alb  2.8[L]  /  TBili  0.4  /  DBili  x   /  AST  15  /  ALT  <5[L]  /  AlkPhos  129[H]  03-03    PT/INR - ( 03 Mar 2025 03:32 )   PT: 12.1 sec;   INR: 1.03          PTT - ( 03 Mar 2025 03:32 )  PTT:31.2 sec  The current medications were reviewed   MEDICATIONS  (STANDING):  aspirin enteric coated 81 milliGRAM(s) Oral daily  atorvastatin 40 milliGRAM(s) Oral at bedtime  chlorhexidine 2% Cloths 1 Application(s) Topical <User Schedule>  clopidogrel Tablet 75 milliGRAM(s) Oral daily  dextrose 5%. 1000 milliLiter(s) (50 mL/Hr) IV Continuous <Continuous>  dextrose 5%. 1000 milliLiter(s) (100 mL/Hr) IV Continuous <Continuous>  dextrose 50% Injectable 50 milliLiter(s) IV Push every 15 minutes  dextrose 50% Injectable 25 milliLiter(s) IV Push every 15 minutes  gabapentin 100 milliGRAM(s) Oral daily  heparin   Injectable 5000 Unit(s) SubCutaneous every 8 hours  influenza  Vaccine (HIGH DOSE) 0.5 milliLiter(s) IntraMuscular once  insulin glargine Injectable (LANTUS) 8 Unit(s) SubCutaneous at bedtime  insulin lispro (ADMELOG) corrective regimen sliding scale   SubCutaneous three times a day before meals  insulin lispro (ADMELOG) corrective regimen sliding scale   SubCutaneous at bedtime  insulin lispro Injectable (ADMELOG) 2 Unit(s) SubCutaneous three times a day before meals  levETIRAcetam 500 milliGRAM(s) Oral every 12 hours  metoprolol tartrate 25 milliGRAM(s) Oral two times a day  modafinil 100 milliGRAM(s) Oral daily  pantoprazole    Tablet 40 milliGRAM(s) Oral daily  polyethylene glycol 3350 17 Gram(s) Oral daily  senna 2 Tablet(s) Oral at bedtime  sodium chloride 0.9%. 1000 milliLiter(s) (10 mL/Hr) IV Continuous <Continuous>  testosterone 1% Gel 100 milliGRAM(s) Topical daily    MEDICATIONS  (PRN):  acetaminophen     Tablet .. 650 milliGRAM(s) Oral every 6 hours PRN Mild Pain (1 - 3)  dextrose Oral Gel 15 Gram(s) Oral once PRN Blood Glucose LESS THAN 70 milliGRAM(s)/deciliter  oxycodone    5 mG/acetaminophen 325 mG 1 Tablet(s) Oral every 6 hours PRN Moderate Pain (4 - 6)              84 year old  Male admitted with Coronary Artery Disease   S/P Off pump CABG x 2 (LIMA to LAD, SVG to OM2).   Arrived to ICU intubated.  Postoperative course complicated by encephalopathy, vaso plegia, posthemorrhagic anemia requiring vasopressor support.  He was extubated day number one.  Hemodynamically stable.  Good oxygenation.  Hemodialysis performed today (Two liters removed).  Improved Glycemic control.      My plan includes :  Statin and Betablocker.  Dual antiplatelet Rx.  Close hemodynamic monitoring.  Monitor for arrhythmias and monitor parameters for organ perfusion  Monitor neurologic status  Monitor renal function.  Head of the bed should remain elevated to 45 deg .   Chest PT and IS will be encouraged  Monitor adequacy of oxygenation   Nutritional goals will be met using po eventually , ensure adequate caloric intake and monitor the same  Stress ulcer and VTE prophylaxis will be achieved    Glycemic control is satisfactory  Electrolytes have been repleted as necessary and wound care has been carried out. Pain control has been achieved.   Aggressive physical therapy and early mobility and ambulation goals will be met   The family was updated about the course and plan  CRITICAL CARE TIME SPENT in evaluation and management, reassessments, review and interpretation of labs and x-rays, hemodynamic management, formulating a plan and coordinating care: ___30____ MIN.  Time does not include procedural time.  CTICU ATTENDING     					    Guy Geller MD

## 2025-03-03 NOTE — PROGRESS NOTE ADULT - SUBJECTIVE AND OBJECTIVE BOX
INTERVAL HPI/OVERNIGHT EVENTS:  \  2/26: CABG x 2  EF normal     85yo Male Hx ESRD/HD, CAD/PCI, TTS, DM, HTN, HLD, BPH, bladder diverticulum, chronic hip pain presented to [facility] with right sided chest pain - (+)NSTEMI    Cath: 3v CAD.  Heparin drip and transferred to [facility].         PAST MEDICAL & SURGICAL HISTORY:  HTN (hypertension)      Gout      DM (diabetes mellitus)      History of BPH      HLD (hyperlipidemia)      CAD (coronary artery disease)  one stent 6 yrs      End stage renal disease      History of cholecystectomy      S/P arteriovenous (AV) fistula creation  1 yr ago            ICU Vital Signs Last 24 Hrs  T(C): 36 (03 Mar 2025 14:19), Max: 37 (02 Mar 2025 21:20)  T(F): 96.8 (03 Mar 2025 14:19), Max: 98.6 (02 Mar 2025 21:20)  HR: 73 (03 Mar 2025 15:00) (68 - 89)  BP: 155/66 (03 Mar 2025 14:00) (122/54 - 157/70)  BP(mean): 95 (03 Mar 2025 14:00) (78 - 110)  ABP: 145/49 (03 Mar 2025 07:15) (101/54 - 165/52)  ABP(mean): 74 (03 Mar 2025 07:15) (68 - 83)  RR: 17 (03 Mar 2025 15:00) (16 - 21)  SpO2: 92% (03 Mar 2025 15:00) (89% - 100%) 2L NC     Qtts: none     I&O's Summary    02 Mar 2025 07:01  -  03 Mar 2025 07:00  --------------------------------------------------------  IN: 780 mL / OUT: 2 mL / NET: 778 mL    03 Mar 2025 07:01  -  03 Mar 2025 15:38  --------------------------------------------------------  IN: 400 mL / OUT: 0 mL / NET: 400 mL    Physical Exam    Heart  Lungs  Abd  Ext  Chest  Neuro  Skin    LABS:                        8.9    5.03  )-----------( 128      ( 03 Mar 2025 03:32 )             27.5     03-03    131[L]  |  91[L]  |  32[H]  ----------------------------<  123[H]  3.9   |  26  |  6.36[H]    Ca    8.7      03 Mar 2025 03:32  Phos  2.1     03-03  Mg     2.4     03-03    TPro  5.9[L]  /  Alb  2.8[L]  /  TBili  0.4  /  DBili  x   /  AST  15  /  ALT  <5[L]  /  AlkPhos  129[H]  03-03    PT/INR - ( 03 Mar 2025 03:32 )   PT: 12.1 sec;   INR: 1.03          PTT - ( 03 Mar 2025 03:32 )  PTT:31.2 sec  Urinalysis Basic - ( 03 Mar 2025 03:32 )    Color: x / Appearance: x / SG: x / pH: x  Gluc: 123 mg/dL / Ketone: x  / Bili: x / Urobili: x   Blood: x / Protein: x / Nitrite: x   Leuk Esterase: x / RBC: x / WBC x   Sq Epi: x / Non Sq Epi: x / Bacteria: x      ABG - ( 03 Mar 2025 03:28 )  pH, Arterial: 7.41  pH, Blood: x     /  pCO2: 43    /  pO2: 105   / HCO3: 27    / Base Excess: 2.2   /  SaO2: 98.6                RADIOLOGY & ADDITIONAL STUDIES:    I have spent/provided stated minutes of critical care time to this patient:  INTERVAL HPI/OVERNIGHT EVENTS:    2/26: CABG x 2  EF normal     85yo Male Hx ESRD/HD, CAD/PCI, TTS, DM, HTN, HLD, BPH, bladder diverticulum, chronic hip pain presented to [facility] with right sided chest pain - (+)NSTEMI    Cath: 3v CAD.  Heparin drip and transferred to [facility].     OR 2/26: intraop: 1.5L LR/500cc Albumin/2u PRBC; arrived on levo/vaso    2/27 - extubated levo and kaitlin required for HD session - Midodrine started   3/2 - ambulated in [place]; unsteady with listing to left - requires assistance           ICU Vital Signs Last 24 Hrs  T(C): 36 (03 Mar 2025 14:19), Max: 37 (02 Mar 2025 21:20)  T(F): 96.8 (03 Mar 2025 14:19), Max: 98.6 (02 Mar 2025 21:20)  HR: 73 (03 Mar 2025 15:00) (68 - 89)  BP: 155/66 (03 Mar 2025 14:00) (122/54 - 157/70)  BP(mean): 95 (03 Mar 2025 14:00) (78 - 110)  ABP: 145/49 (03 Mar 2025 07:15) (101/54 - 165/52)  ABP(mean): 74 (03 Mar 2025 07:15) (68 - 83)  RR: 17 (03 Mar 2025 15:00) (16 - 21)  SpO2: 92% (03 Mar 2025 15:00) (89% - 100%) 2L NC     Qtts: none     I&O's Summary    02 Mar 2025 07:01  -  03 Mar 2025 07:00  --------------------------------------------------------  IN: 780 mL / OUT: 2 mL / NET: 778 mL    03 Mar 2025 07:01  -  03 Mar 2025 15:38  --------------------------------------------------------  IN: 400 mL / OUT: 0 mL / NET: 400 mL    Physical Exam    Heart  Lungs  Abd  Ext  Chest  Neuro  Skin    LABS:                        8.9    5.03  )-----------( 128      ( 03 Mar 2025 03:32 )             27.5     03-03    131[L]  |  91[L]  |  32[H]  ----------------------------<  123[H]  3.9   |  26  |  6.36[H]    Ca    8.7      03 Mar 2025 03:32  Phos  2.1     03-03  Mg     2.4     03-03    TPro  5.9[L]  /  Alb  2.8[L]  /  TBili  0.4  /  DBili  x   /  AST  15  /  ALT  <5[L]  /  AlkPhos  129[H]  03-03    PT/INR - ( 03 Mar 2025 03:32 )   PT: 12.1 sec;   INR: 1.03          PTT - ( 03 Mar 2025 03:32 )  PTT:31.2 sec  Urinalysis Basic - ( 03 Mar 2025 03:32 )    Color: x / Appearance: x / SG: x / pH: x  Gluc: 123 mg/dL / Ketone: x  / Bili: x / Urobili: x   Blood: x / Protein: x / Nitrite: x   Leuk Esterase: x / RBC: x / WBC x   Sq Epi: x / Non Sq Epi: x / Bacteria: x      ABG - ( 03 Mar 2025 03:28 )  pH, Arterial: 7.41  pH, Blood: x     /  pCO2: 43    /  pO2: 105   / HCO3: 27    / Base Excess: 2.2   /  SaO2: 98.6                RADIOLOGY & ADDITIONAL STUDIES:    I have spent/provided stated minutes of critical care time to this patient:  INTERVAL HPI/OVERNIGHT EVENTS:    2/26: CABG x 2  EF normal     83yo Male Hx ESRD/HD, CAD/PCI, TTS, DM, HTN, HLD, BPH, bladder diverticulum, chronic hip pain presented to [facility] with right sided chest pain - (+)NSTEMI    Cath: 3v CAD.  Heparin drip and transferred to [facility].     OR 2/26: intraop: 1.5L LR/500cc Albumin/2u PRBC; arrived on levo/vaso    2/27 - extubated levo and kaitlin required for HD session - Midodrine started   3/2 - ambulated in [place]; unsteady with listing to left - requires assistance     ICU Vital Signs Last 24 Hrs  T(C): 36 (03 Mar 2025 14:19), Max: 37 (02 Mar 2025 21:20)  T(F): 96.8 (03 Mar 2025 14:19), Max: 98.6 (02 Mar 2025 21:20)  HR: 73 (03 Mar 2025 15:00) (68 - 89) sinus   BP: 155/66 (03 Mar 2025 14:00) (122/54 - 157/70)  BP(mean): 95 (03 Mar 2025 14:00) (78 - 110)  ABP: 145/49 (03 Mar 2025 07:15) (101/54 - 165/52)  ABP(mean): 74 (03 Mar 2025 07:15) (68 - 83)  RR: 17 (03 Mar 2025 15:00) (16 - 21)  SpO2: 92% (03 Mar 2025 15:00) (89% - 100%) 2L NC     Qtts: none     I&O's Summary    02 Mar 2025 07:01  -  03 Mar 2025 07:00  --------------------------------------------------------  IN: 780 mL / OUT: 2 mL / NET: 778 mL    03 Mar 2025 07:01  -  03 Mar 2025 15:38  --------------------------------------------------------  IN: 400 mL / OUT: 0 mL / NET: 400 mL    Physical Exam    Heart - regular (-)rub/gallop  Lungs - CTA anterior - no rhonchi/wheeze  Abd - (+)BS soft NTND (-)r/r/g  Ext - warm to touch; no cyanosis/clubbing   Chest - incision site clean and dry  Neuro - alert and oriented ; interactive -   Skin - no rash     LABS:                        8.9    5.03  )-----------( 128      ( 03 Mar 2025 03:32 )             27.5     03-03    131[L]  |  91[L]  |  32[H]  ----------------------------<  123[H]  3.9   |  26  |  6.36[H]    Ca    8.7      03 Mar 2025 03:32  Phos  2.1     03-03  Mg     2.4     03-03    TPro  5.9[L]  /  Alb  2.8[L]  /  TBili  0.4  /  DBili  x   /  AST  15  /  ALT  <5[L]  /  AlkPhos  129[H]  03-03    PT/INR - ( 03 Mar 2025 03:32 )   PT: 12.1 sec;   INR: 1.03     PTT - ( 03 Mar 2025 03:32 )  PTT:31.2 sec    ABG - ( 03 Mar 2025 03:28 )  pH, Arterial: 7.41  pH, Blood: x     /  pCO2: 43    /  pO2: 105   / HCO3: 27    / Base Excess: 2.2   /  SaO2: 98.6      RADIOLOGY & ADDITIONAL STUDIES: reviewed     patient with symptomatic aortic and mitral valve disease s/p AVR/MVR (2/26) - doing well     1. CV  stable hemodynamics  ASA/Plavix/statin   sinus rhythm  Metoprolol 25 BID - titrate as clinical scenario allows     2. Pulm   titrate off HFNC - transition to NC and wean down as tolerated  incentive spirometry  ambulation with staff assist  PT/OT  POCUS to assess effusion     3. Renal   ongoing HD - renal following     d/c planning initiated    d/w patient/staff/CTS

## 2025-03-03 NOTE — PROGRESS NOTE ADULT - SUBJECTIVE AND OBJECTIVE BOX
NEPHROLOGY PROGRESS NOTE:    Interval history:  Patient seen and examined at bedside.     Vitals:  T(F): 96.8 (25 @ 14:19), Max: 98.6 (25 @ 21:20)  HR: 72 (25 @ 14:00)  BP: 155/66 (25 @ 14:00)  RR: 17 (25 @ 14:00)  SpO2: 92% (25 @ 14:00)  Wt(kg): --     @ 07:  -   @ 07:00  --------------------------------------------------------  IN: 926.4 mL / OUT: 2340 mL / NET: -1413.6 mL     @ 07:  -   @ 07:00  --------------------------------------------------------  IN: 780 mL / OUT: 2 mL / NET: 778 mL     @ 07:01  -   @ 15:07  --------------------------------------------------------  IN: 400 mL / OUT: 0 mL / NET: 400 mL          PE:  General: Confused, on NC 2L/min   Chest: Diminished breath due to poor inspiratory effort  Heart: RRR, S1/S2 wnl, no MRG  Abdomen: Soft, nontender, nondistended   Extremities: Dependent edema  Neuro:  Alert, no apparent focal deficits, confused   Access: RUE AVF with palpable thrill    Pertinent labs & Imagin-03    131[L]  |  91[L]  |  32[H]  ----------------------------<  123[H]  3.9   |  26  |  6.36[H]    Ca    8.7      03 Mar 2025 03:32  Phos  2.1     -  Mg     2.4     -    TPro  5.9[L]  /  Alb  2.8[L]  /  TBili  0.4  /  DBili      /  AST  15  /  ALT  <5[L]  /  AlkPhos  129[H]  03-                          8.9    5.03  )-----------( 128      ( 03 Mar 2025 03:32 )             27.5       Urine Studies:  Urinalysis Basic - ( 03 Mar 2025 03:32 )    Color:  / Appearance:  / SG:  / pH:   Gluc: 123 mg/dL / Ketone:   / Bili:  / Urobili:    Blood:  / Protein:  / Nitrite:    Leuk Esterase:  / RBC:  / WBC    Sq Epi:  / Non Sq Epi:  / Bacteria:             MEDICATIONS  (STANDING):  aspirin enteric coated 81 milliGRAM(s) Oral daily  atorvastatin 40 milliGRAM(s) Oral at bedtime  chlorhexidine 2% Cloths 1 Application(s) Topical <User Schedule>  clopidogrel Tablet 75 milliGRAM(s) Oral daily  dextrose 5%. 1000 milliLiter(s) (50 mL/Hr) IV Continuous <Continuous>  dextrose 5%. 1000 milliLiter(s) (100 mL/Hr) IV Continuous <Continuous>  dextrose 50% Injectable 50 milliLiter(s) IV Push every 15 minutes  dextrose 50% Injectable 25 milliLiter(s) IV Push every 15 minutes  gabapentin 100 milliGRAM(s) Oral daily  heparin   Injectable 5000 Unit(s) SubCutaneous every 8 hours  influenza  Vaccine (HIGH DOSE) 0.5 milliLiter(s) IntraMuscular once  insulin glargine Injectable (LANTUS) 8 Unit(s) SubCutaneous at bedtime  insulin lispro (ADMELOG) corrective regimen sliding scale   SubCutaneous three times a day before meals  insulin lispro (ADMELOG) corrective regimen sliding scale   SubCutaneous at bedtime  insulin lispro Injectable (ADMELOG) 2 Unit(s) SubCutaneous three times a day before meals  levETIRAcetam 500 milliGRAM(s) Oral every 12 hours  metoprolol tartrate 25 milliGRAM(s) Oral two times a day  modafinil 100 milliGRAM(s) Oral daily  pantoprazole    Tablet 40 milliGRAM(s) Oral daily  polyethylene glycol 3350 17 Gram(s) Oral daily  senna 2 Tablet(s) Oral at bedtime  sodium chloride 0.9%. 1000 milliLiter(s) (10 mL/Hr) IV Continuous <Continuous>  testosterone 1% Gel 100 milliGRAM(s) Topical daily

## 2025-03-04 LAB
ALBUMIN SERPL ELPH-MCNC: 3.1 G/DL — LOW (ref 3.3–5)
ALBUMIN SERPL ELPH-MCNC: 3.2 G/DL — LOW (ref 3.3–5)
ALP SERPL-CCNC: 265 U/L — HIGH (ref 40–120)
ALP SERPL-CCNC: 306 U/L — HIGH (ref 40–120)
ALT FLD-CCNC: <5 U/L — LOW (ref 10–45)
ALT FLD-CCNC: SIGNIFICANT CHANGE UP (ref 10–45)
ANION GAP SERPL CALC-SCNC: 13 MMOL/L — SIGNIFICANT CHANGE UP (ref 5–17)
ANION GAP SERPL CALC-SCNC: 16 MMOL/L — SIGNIFICANT CHANGE UP (ref 5–17)
APTT BLD: 22 SEC — LOW (ref 24.5–35.6)
AST SERPL-CCNC: 15 U/L — SIGNIFICANT CHANGE UP (ref 10–40)
AST SERPL-CCNC: SIGNIFICANT CHANGE UP (ref 10–40)
BILIRUB SERPL-MCNC: 0.4 MG/DL — SIGNIFICANT CHANGE UP (ref 0.2–1.2)
BILIRUB SERPL-MCNC: 0.5 MG/DL — SIGNIFICANT CHANGE UP (ref 0.2–1.2)
BUN SERPL-MCNC: 24 MG/DL — HIGH (ref 7–23)
BUN SERPL-MCNC: 30 MG/DL — HIGH (ref 7–23)
CALCIUM SERPL-MCNC: 8.1 MG/DL — LOW (ref 8.4–10.5)
CALCIUM SERPL-MCNC: 8.7 MG/DL — SIGNIFICANT CHANGE UP (ref 8.4–10.5)
CHLORIDE SERPL-SCNC: 96 MMOL/L — SIGNIFICANT CHANGE UP (ref 96–108)
CHLORIDE SERPL-SCNC: 98 MMOL/L — SIGNIFICANT CHANGE UP (ref 96–108)
CO2 SERPL-SCNC: 24 MMOL/L — SIGNIFICANT CHANGE UP (ref 22–31)
CO2 SERPL-SCNC: 25 MMOL/L — SIGNIFICANT CHANGE UP (ref 22–31)
CREAT SERPL-MCNC: 4.85 MG/DL — HIGH (ref 0.5–1.3)
CREAT SERPL-MCNC: 5.86 MG/DL — HIGH (ref 0.5–1.3)
EGFR: 11 ML/MIN/1.73M2 — LOW
EGFR: 11 ML/MIN/1.73M2 — LOW
EGFR: 9 ML/MIN/1.73M2 — LOW
EGFR: 9 ML/MIN/1.73M2 — LOW
GAS PNL BLDA: SIGNIFICANT CHANGE UP
GLUCOSE SERPL-MCNC: 126 MG/DL — HIGH (ref 70–99)
GLUCOSE SERPL-MCNC: 215 MG/DL — HIGH (ref 70–99)
HCT VFR BLD CALC: 31.1 % — LOW (ref 39–50)
HGB BLD-MCNC: 10 G/DL — LOW (ref 13–17)
INR BLD: 0.93 — SIGNIFICANT CHANGE UP (ref 0.85–1.16)
MAGNESIUM SERPL-MCNC: 2.2 MG/DL — SIGNIFICANT CHANGE UP (ref 1.6–2.6)
MAGNESIUM SERPL-MCNC: 2.3 MG/DL — SIGNIFICANT CHANGE UP (ref 1.6–2.6)
MCHC RBC-ENTMCNC: 31.9 PG — SIGNIFICANT CHANGE UP (ref 27–34)
MCHC RBC-ENTMCNC: 32.2 G/DL — SIGNIFICANT CHANGE UP (ref 32–36)
MCV RBC AUTO: 99.4 FL — SIGNIFICANT CHANGE UP (ref 80–100)
NRBC BLD AUTO-RTO: 0 /100 WBCS — SIGNIFICANT CHANGE UP (ref 0–0)
PHOSPHATE SERPL-MCNC: 1.9 MG/DL — LOW (ref 2.5–4.5)
PLATELET # BLD AUTO: 185 K/UL — SIGNIFICANT CHANGE UP (ref 150–400)
POTASSIUM SERPL-MCNC: 4.5 MMOL/L — SIGNIFICANT CHANGE UP (ref 3.5–5.3)
POTASSIUM SERPL-MCNC: SIGNIFICANT CHANGE UP (ref 3.5–5.3)
POTASSIUM SERPL-SCNC: 4.5 MMOL/L — SIGNIFICANT CHANGE UP (ref 3.5–5.3)
POTASSIUM SERPL-SCNC: SIGNIFICANT CHANGE UP (ref 3.5–5.3)
PROT SERPL-MCNC: 6.1 G/DL — SIGNIFICANT CHANGE UP (ref 6–8.3)
PROT SERPL-MCNC: 6.5 G/DL — SIGNIFICANT CHANGE UP (ref 6–8.3)
PROTHROM AB SERPL-ACNC: 10.7 SEC — SIGNIFICANT CHANGE UP (ref 9.9–13.4)
RBC # BLD: 3.13 M/UL — LOW (ref 4.2–5.8)
RBC # FLD: 16.1 % — HIGH (ref 10.3–14.5)
SODIUM SERPL-SCNC: 136 MMOL/L — SIGNIFICANT CHANGE UP (ref 135–145)
SODIUM SERPL-SCNC: 136 MMOL/L — SIGNIFICANT CHANGE UP (ref 135–145)
WBC # BLD: 7.66 K/UL — SIGNIFICANT CHANGE UP (ref 3.8–10.5)
WBC # FLD AUTO: 7.66 K/UL — SIGNIFICANT CHANGE UP (ref 3.8–10.5)

## 2025-03-04 PROCEDURE — 99233 SBSQ HOSP IP/OBS HIGH 50: CPT

## 2025-03-04 PROCEDURE — 71045 X-RAY EXAM CHEST 1 VIEW: CPT | Mod: 26

## 2025-03-04 RX ORDER — METOPROLOL SUCCINATE 50 MG/1
25 TABLET, EXTENDED RELEASE ORAL EVERY 8 HOURS
Refills: 0 | Status: DISCONTINUED | OUTPATIENT
Start: 2025-03-04 | End: 2025-03-04

## 2025-03-04 RX ORDER — METOPROLOL SUCCINATE 50 MG/1
25 TABLET, EXTENDED RELEASE ORAL EVERY 8 HOURS
Refills: 0 | Status: DISCONTINUED | OUTPATIENT
Start: 2025-03-04 | End: 2025-03-05

## 2025-03-04 RX ORDER — OXYCODONE HYDROCHLORIDE 30 MG/1
5 TABLET ORAL EVERY 6 HOURS
Refills: 0 | Status: DISCONTINUED | OUTPATIENT
Start: 2025-03-04 | End: 2025-03-10

## 2025-03-04 RX ADMIN — Medication 2 TABLET(S): at 23:05

## 2025-03-04 RX ADMIN — POLYETHYLENE GLYCOL 3350 17 GRAM(S): 17 POWDER, FOR SOLUTION ORAL at 12:31

## 2025-03-04 RX ADMIN — METOPROLOL SUCCINATE 25 MILLIGRAM(S): 50 TABLET, EXTENDED RELEASE ORAL at 06:07

## 2025-03-04 RX ADMIN — METOPROLOL SUCCINATE 25 MILLIGRAM(S): 50 TABLET, EXTENDED RELEASE ORAL at 23:05

## 2025-03-04 RX ADMIN — Medication 3 MILLILITER(S): at 13:12

## 2025-03-04 RX ADMIN — HEPARIN SODIUM 5000 UNIT(S): 1000 INJECTION INTRAVENOUS; SUBCUTANEOUS at 23:04

## 2025-03-04 RX ADMIN — METOPROLOL SUCCINATE 25 MILLIGRAM(S): 50 TABLET, EXTENDED RELEASE ORAL at 13:16

## 2025-03-04 RX ADMIN — INSULIN LISPRO 2 UNIT(S): 100 INJECTION, SOLUTION INTRAVENOUS; SUBCUTANEOUS at 11:55

## 2025-03-04 RX ADMIN — INSULIN LISPRO 4: 100 INJECTION, SOLUTION INTRAVENOUS; SUBCUTANEOUS at 16:57

## 2025-03-04 RX ADMIN — Medication 650 MILLIGRAM(S): at 06:08

## 2025-03-04 RX ADMIN — HEPARIN SODIUM 5000 UNIT(S): 1000 INJECTION INTRAVENOUS; SUBCUTANEOUS at 13:16

## 2025-03-04 RX ADMIN — Medication 650 MILLIGRAM(S): at 07:00

## 2025-03-04 RX ADMIN — Medication 40 MILLIGRAM(S): at 11:56

## 2025-03-04 RX ADMIN — INSULIN LISPRO 2 UNIT(S): 100 INJECTION, SOLUTION INTRAVENOUS; SUBCUTANEOUS at 07:42

## 2025-03-04 RX ADMIN — INSULIN GLARGINE-YFGN 8 UNIT(S): 100 INJECTION, SOLUTION SUBCUTANEOUS at 23:05

## 2025-03-04 RX ADMIN — ATORVASTATIN CALCIUM 40 MILLIGRAM(S): 80 TABLET, FILM COATED ORAL at 23:05

## 2025-03-04 RX ADMIN — Medication 81 MILLIGRAM(S): at 11:56

## 2025-03-04 RX ADMIN — LEVETIRACETAM 500 MILLIGRAM(S): 10 INJECTION, SOLUTION INTRAVENOUS at 18:19

## 2025-03-04 RX ADMIN — INSULIN LISPRO 2 UNIT(S): 100 INJECTION, SOLUTION INTRAVENOUS; SUBCUTANEOUS at 16:56

## 2025-03-04 RX ADMIN — Medication 1 APPLICATION(S): at 06:05

## 2025-03-04 RX ADMIN — INSULIN LISPRO 2: 100 INJECTION, SOLUTION INTRAVENOUS; SUBCUTANEOUS at 07:41

## 2025-03-04 RX ADMIN — LEVETIRACETAM 500 MILLIGRAM(S): 10 INJECTION, SOLUTION INTRAVENOUS at 06:06

## 2025-03-04 RX ADMIN — GABAPENTIN 100 MILLIGRAM(S): 400 CAPSULE ORAL at 11:57

## 2025-03-04 RX ADMIN — Medication 3 MILLILITER(S): at 22:23

## 2025-03-04 RX ADMIN — HEPARIN SODIUM 5000 UNIT(S): 1000 INJECTION INTRAVENOUS; SUBCUTANEOUS at 06:06

## 2025-03-04 RX ADMIN — CLOPIDOGREL BISULFATE 75 MILLIGRAM(S): 75 TABLET, FILM COATED ORAL at 11:56

## 2025-03-04 NOTE — PROGRESS NOTE ADULT - ASSESSMENT
83yo M w/ PMHx of ESRD (HD T/Thurs/Sat), CAD (s/p PCI), T2DM, HTN, HLD, BPH, Bladder diverticulum, chronic hip pain, who presented to [facility] with chest pain, found w/ NSTEMI, so patient was taken for cardiac cath which revealed 3vCAD. Patient was started on a hep gtt and on 2/24/25, patient was transferred to [facility] under  for further management.  85yo M w/ PMHx of ESRD (HD T/Thurs/Sat), CAD (s/p PCI), T2DM, HTN, HLD, BPH, Bladder diverticulum, chronic hip pain, who presented to [facility] with chest pain, found w/ NSTEMI, so patient was taken for cardiac cath which revealed 3vCAD. Patient was started on a hep gtt and on 2/24/25, patient was transferred to [facility] under  for further management. On 2/26/25, patient underwent CABG x2 (LIMA-LAD, SVG-OM2), EF normal with . Intra-op, patient received 1.5L LR/ 500cc Albumin/ 2u PRBC, arrived to CTICU intubated on Levo/ Vaso. Got HD. POD1, patient was extubated overnight, remained on Levo and Matias while undergoing HD -2L, started on PO Midodrine 10mg q8hrs while still on pressors, substernal tube removed. POD2, patient lethargic, difficulty with ambulation, continued on pressors for cerebral perfusion. Received HD -2.5L. POD3, mental status improved, got HD with just UF session, received 1u PRBC, all drains removed except 1 mediastinal latrice, continued on pressors and Midodrine. POD4, weaned off pressors ovenright, ambulated well. POCUS w/ small L pleural effusion. POD5, started on Lopressor 25mg BID, last mediastinal latrice removed, moderate L pleural effusion on POCUS, got HD. POD6, Lopressor increased, POCUS w/ similar appearing L pleural effusion without window, planned for HD tomorrow 3/5/25. Patient remains on 1-2L NC, will plan to ambulate today and encourage IS.     A/P:  Neurovascular: No delirium. Pain well controlled with current regimen.  -PRN's.  -Patient denies h/o seizures vs CVA: per sign out from ICU, patient w/ abnormal mental status on POD 3-4, possible hx of CVA w/ seizure in past per [place] documentation continued on Keppra 500mg BID, mental status improved with higher MAP goals while in pressors.      Cardiovascular: Hemodynamically stable. HR controlled.  -CAD, s/p CABG x2 (LIMA-LAD, SVG-OM2), EF normal (2/26/25)  C/w ASA, plavix, Lopressor 25mg q8hrs, Lipitor 40mg daily    C/w ERP   WIll work on IS, ambulation   -HLD: c/w Lipitor 40mg daily     Respiratory: 02 Sat = 98% on 2L NC  -If on oxygen wean to RA from for O2 Sat > 93%.  -Encourage C+DB and Use of IS 10x / hr while awake.  -CXR: small L pleural effusion   Ongoing diuresis via HD sessions    GI: Stable.  -PPX: pantoprazole 40mg daily   -PO Diet.  -Bowel regimen: miralax, senna     Renal / :  -Monitor renal function: BUN 32, Cr 6.36 (3/3/35), pending repeats   -Monitor I/O's.  -ESRD (on HD t/thurs/sat): last got HD on 3/3/35   Nephrology following, appreciate recs  Per nephrology, likely will get HD tomorrow 3/5/25  Makes very minimal urine per patient, none documented in chart and on bladder scan, nothing in bladder  Has not been getting diuresed w/ oral meds     Endocrine:    -T2DM (A1c: 7.7%): on home meds, held inpatient  Remains on Lantus 8 units nightly, Lispro 2 units w/ meals, ISS.   Monitoring glucose, adjust regimen prn   -TSH: 0.514, no hx    Hematologic:  -CBC: RBC 2.83, Hgb 8.9, Plt 128 (3/3/25), pending repeats  -Coagulation Panel: PTT 31.2, PT 12.1, INR 1.03 (3/3/25), pending repeats    ID:  -Tempature: afebrile  -CBC: WBC 5.03 (3/3/35), pending repeats   -Observe for SIRS/Sepsis Syndrome.    Prophylaxis:  -DVT prophylaxis with 5000 SubQ Heparin q8h.  -SCD's    Disposition:  -Telemetry, transferred from ICU to floor today, undergoing continued diuresis. Monitoring pleural effusions. Will eventually need wires CUT

## 2025-03-04 NOTE — PROGRESS NOTE ADULT - ASSESSMENT
84-year-old M with PMH of ESRD on HD TTS transferred to [facility] for CABG (2/24).  CABG performed on 2/26.  Nephrology following for ESRD management and postoperative volume management.    1-ESRD on HD TTS s/p CABG:  Last HD on 3/3 for volume optimization  HD tomorrow off outpatient schedule  –Strict I/O monitoring  –Access: RUE AVF    2-BP/Volume management: SBP in the 150s   –Consider starting carvedilol 6.25mg twice daily    Thank you for consulting Nephrology.    Telly Ramirez MD  PGY-4 Nephrology Fellow

## 2025-03-05 LAB
ALBUMIN SERPL ELPH-MCNC: 3.3 G/DL — SIGNIFICANT CHANGE UP (ref 3.3–5)
ALP SERPL-CCNC: 251 U/L — HIGH (ref 40–120)
ALT FLD-CCNC: <5 U/L — LOW (ref 10–45)
ANION GAP SERPL CALC-SCNC: 17 MMOL/L — SIGNIFICANT CHANGE UP (ref 5–17)
APTT BLD: 29.5 SEC — SIGNIFICANT CHANGE UP (ref 24.5–35.6)
AST SERPL-CCNC: 12 U/L — SIGNIFICANT CHANGE UP (ref 10–40)
BILIRUB SERPL-MCNC: 0.4 MG/DL — SIGNIFICANT CHANGE UP (ref 0.2–1.2)
BUN SERPL-MCNC: 37 MG/DL — HIGH (ref 7–23)
CALCIUM SERPL-MCNC: 8.6 MG/DL — SIGNIFICANT CHANGE UP (ref 8.4–10.5)
CHLORIDE SERPL-SCNC: 95 MMOL/L — LOW (ref 96–108)
CO2 SERPL-SCNC: 22 MMOL/L — SIGNIFICANT CHANGE UP (ref 22–31)
CREAT SERPL-MCNC: 6.79 MG/DL — HIGH (ref 0.5–1.3)
EGFR: 8 ML/MIN/1.73M2 — LOW
EGFR: 8 ML/MIN/1.73M2 — LOW
GLUCOSE SERPL-MCNC: 163 MG/DL — HIGH (ref 70–99)
HCT VFR BLD CALC: 33.5 % — LOW (ref 39–50)
HGB BLD-MCNC: 10.3 G/DL — LOW (ref 13–17)
INR BLD: 0.97 — SIGNIFICANT CHANGE UP (ref 0.85–1.16)
MAGNESIUM SERPL-MCNC: 2.3 MG/DL — SIGNIFICANT CHANGE UP (ref 1.6–2.6)
MCHC RBC-ENTMCNC: 30.7 G/DL — LOW (ref 32–36)
MCHC RBC-ENTMCNC: 30.8 PG — SIGNIFICANT CHANGE UP (ref 27–34)
MCV RBC AUTO: 100.3 FL — HIGH (ref 80–100)
NRBC BLD AUTO-RTO: 0 /100 WBCS — SIGNIFICANT CHANGE UP (ref 0–0)
PHOSPHATE SERPL-MCNC: 2.8 MG/DL — SIGNIFICANT CHANGE UP (ref 2.5–4.5)
PLATELET # BLD AUTO: 226 K/UL — SIGNIFICANT CHANGE UP (ref 150–400)
POTASSIUM SERPL-MCNC: 4.8 MMOL/L — SIGNIFICANT CHANGE UP (ref 3.5–5.3)
POTASSIUM SERPL-SCNC: 4.8 MMOL/L — SIGNIFICANT CHANGE UP (ref 3.5–5.3)
PROT SERPL-MCNC: 6.6 G/DL — SIGNIFICANT CHANGE UP (ref 6–8.3)
PROTHROM AB SERPL-ACNC: 11.3 SEC — SIGNIFICANT CHANGE UP (ref 9.9–13.4)
RBC # BLD: 3.34 M/UL — LOW (ref 4.2–5.8)
RBC # FLD: 16.3 % — HIGH (ref 10.3–14.5)
SODIUM SERPL-SCNC: 134 MMOL/L — LOW (ref 135–145)
WBC # BLD: 9.88 K/UL — SIGNIFICANT CHANGE UP (ref 3.8–10.5)
WBC # FLD AUTO: 9.88 K/UL — SIGNIFICANT CHANGE UP (ref 3.8–10.5)

## 2025-03-05 PROCEDURE — 99222 1ST HOSP IP/OBS MODERATE 55: CPT | Mod: GC

## 2025-03-05 PROCEDURE — 71045 X-RAY EXAM CHEST 1 VIEW: CPT | Mod: 26

## 2025-03-05 PROCEDURE — 99232 SBSQ HOSP IP/OBS MODERATE 35: CPT

## 2025-03-05 RX ORDER — CARVEDILOL 3.12 MG/1
3.12 TABLET, FILM COATED ORAL EVERY 12 HOURS
Refills: 0 | Status: DISCONTINUED | OUTPATIENT
Start: 2025-03-05 | End: 2025-03-10

## 2025-03-05 RX ADMIN — METOPROLOL SUCCINATE 25 MILLIGRAM(S): 50 TABLET, EXTENDED RELEASE ORAL at 05:07

## 2025-03-05 RX ADMIN — OXYCODONE HYDROCHLORIDE 5 MILLIGRAM(S): 30 TABLET ORAL at 05:13

## 2025-03-05 RX ADMIN — HEPARIN SODIUM 5000 UNIT(S): 1000 INJECTION INTRAVENOUS; SUBCUTANEOUS at 21:29

## 2025-03-05 RX ADMIN — Medication 650 MILLIGRAM(S): at 01:16

## 2025-03-05 RX ADMIN — CARVEDILOL 3.12 MILLIGRAM(S): 3.12 TABLET, FILM COATED ORAL at 17:06

## 2025-03-05 RX ADMIN — POLYETHYLENE GLYCOL 3350 17 GRAM(S): 17 POWDER, FOR SOLUTION ORAL at 11:16

## 2025-03-05 RX ADMIN — Medication 3 MILLILITER(S): at 21:51

## 2025-03-05 RX ADMIN — HEPARIN SODIUM 5000 UNIT(S): 1000 INJECTION INTRAVENOUS; SUBCUTANEOUS at 05:06

## 2025-03-05 RX ADMIN — Medication 650 MILLIGRAM(S): at 00:16

## 2025-03-05 RX ADMIN — INSULIN LISPRO 2 UNIT(S): 100 INJECTION, SOLUTION INTRAVENOUS; SUBCUTANEOUS at 07:43

## 2025-03-05 RX ADMIN — HEPARIN SODIUM 5000 UNIT(S): 1000 INJECTION INTRAVENOUS; SUBCUTANEOUS at 13:47

## 2025-03-05 RX ADMIN — Medication 81 MILLIGRAM(S): at 11:16

## 2025-03-05 RX ADMIN — Medication 3 MILLILITER(S): at 05:02

## 2025-03-05 RX ADMIN — ATORVASTATIN CALCIUM 40 MILLIGRAM(S): 80 TABLET, FILM COATED ORAL at 21:29

## 2025-03-05 RX ADMIN — INSULIN LISPRO 2 UNIT(S): 100 INJECTION, SOLUTION INTRAVENOUS; SUBCUTANEOUS at 11:40

## 2025-03-05 RX ADMIN — INSULIN LISPRO 2 UNIT(S): 100 INJECTION, SOLUTION INTRAVENOUS; SUBCUTANEOUS at 17:04

## 2025-03-05 RX ADMIN — INSULIN GLARGINE-YFGN 8 UNIT(S): 100 INJECTION, SOLUTION SUBCUTANEOUS at 21:53

## 2025-03-05 RX ADMIN — OXYCODONE HYDROCHLORIDE 5 MILLIGRAM(S): 30 TABLET ORAL at 04:13

## 2025-03-05 RX ADMIN — LEVETIRACETAM 500 MILLIGRAM(S): 10 INJECTION, SOLUTION INTRAVENOUS at 05:06

## 2025-03-05 RX ADMIN — INSULIN LISPRO 2: 100 INJECTION, SOLUTION INTRAVENOUS; SUBCUTANEOUS at 07:43

## 2025-03-05 RX ADMIN — Medication 40 MILLIGRAM(S): at 11:16

## 2025-03-05 RX ADMIN — INSULIN LISPRO 4: 100 INJECTION, SOLUTION INTRAVENOUS; SUBCUTANEOUS at 17:04

## 2025-03-05 RX ADMIN — Medication 2 TABLET(S): at 21:29

## 2025-03-05 RX ADMIN — LEVETIRACETAM 500 MILLIGRAM(S): 10 INJECTION, SOLUTION INTRAVENOUS at 17:06

## 2025-03-05 RX ADMIN — CLOPIDOGREL BISULFATE 75 MILLIGRAM(S): 75 TABLET, FILM COATED ORAL at 11:16

## 2025-03-05 NOTE — PROGRESS NOTE ADULT - ASSESSMENT
84-year-old M with PMH of ESRD on HD TTS transferred to [facility] for CABG (2/24).  CABG performed on 2/26.  Nephrology following for ESRD management and postoperative volume management.    1-ESRD on HD TTS s/p CABG:  Last HD on 3/3 for volume optimization  HD tomorrow off outpatient schedule:  Duration: 210 minutes, target fluid removal: 2 L, Dialysate electrolytes: Potassium 2, calcium 2.5, sodium 138, bicarbonate 35  –Strict I/O monitoring  –Access: RUE AVF    2-BP/Volume management: SBP in the 120s  Started on carvedilol 3.125 mg    Thank you for consulting Nephrology.    Telly Ramirez MD  PGY-4 Nephrology Fellow 84-year-old M with PMH of ESRD on HD TTS transferred to [facility] for CABG (2/24).  CABG performed on 2/26.  Nephrology following for ESRD management and postoperative volume management.    1-ESRD on HD TTS s/p CABG:  Last HD on 3/3 for volume optimization  HD today off outpatient schedule:  Duration: 210 minutes, target fluid removal: 2 L, Dialysate electrolytes: Potassium 2, calcium 2.5, sodium 138, bicarbonate 35  –Strict I/O monitoring  –Access: RUE AVF    2-BP/Volume management: SBP in the 120s  Started on carvedilol 3.125 mg    Thank you for consulting Nephrology.    Telly Ramirez MD  PGY-4 Nephrology Fellow

## 2025-03-05 NOTE — CONSULT NOTE ADULT - ASSESSMENT
**NOTE INCOMPLETE**  84-year-old male with PMHx of ESRD on HD admitted for CABG, now with functional, gait, and ADL impairments    Debility status post CABG for CAD, post-operative state  Hypoxia, pleural effusion  ESRD on HD - TTS    PLAN / RECOMMENDATIONS:     # Rehab / Mobilization:   - Initial therapy assessment: [X] PT  [X] OT   - Continue skilled therapy while admitted to prevent secondary complications of immobility focus on transfer training, bed mobility, progressive ambulation, and equipment evaluation.   - OOB throughout the day with staff or OOB in chair with meals   - Weight bearing status: as tolerated  - Therapy precautions: cardiac, sternal, monitor orthostasis    # Bracing/Splinting:   - None indicated at this time      # Pain Management:   - Current pain regimen reviewed    # Speech/ Swallow:   - Diet:  reg + thins  - Tolerating PO    # GI/ Bowel:  - Continue to monitor bowel patterns.   - Bowel Regimen: Senna, Miralax    # / Bladder:  - Anuric, on HD- TTS  - Nephrology following    # Disposition optimization:  - Disposition recommendation - Acute Inpatient Rehabilitation   - Patient is recommended for AR, however if he refuses rehab will need to optimize for home (home DME, home PT/OT, and VNS)  - Barriers to discharge: medical optimization, decision for AR vs home w/ services    Patient has significant functional impairments and would benefit from continued rehabilitation in the ACUTE inpatient rehab setting. The patient has both medical and functional needs appropriate for acute inpatient rehabilitation and would benefit from comprehensive interdisciplinary care, including a physiatrist, rehabilitation nursing, PT, OT, SLT and case management/social work. We anticipate that the patient would be able to tolerate 3 hours of PT/OT/SLT per day for 5 to 6 days per week to focus on mobility, transfers, gait training with assistive devices, ADL training, speech, swallow, cognition, and patient education/safety.

## 2025-03-05 NOTE — PROGRESS NOTE ADULT - SUBJECTIVE AND OBJECTIVE BOX
NEPHROLOGY PROGRESS NOTE:    Interval history:  Patient seen and examined at bedside.     Vitals:  T(F): 96.4 (25 @ 07:00), Max: 97.2 (25 @ 21:36)  HR: 74 (25 @ 04:00)  BP: 129/61 (25 @ 04:00)  RR: 17 (25 @ 04:00)  SpO2: 94% (25 @ 04:00)  Wt(kg): --    :  -   07:00  --------------------------------------------------------  IN: 600 mL / OUT: 0 mL / NET: 600 mL    :01  -   @ 07:00  --------------------------------------------------------  IN: 165 mL / OUT: 0 mL / NET: 165 mL          PE:  General: Confused, on NC 2L/min   Chest: Diminished breath due to poor inspiratory effort  Heart: RRR, S1/S2 wnl, no MRG  Abdomen: Soft, nontender, nondistended   Extremities: No edema  Neuro:  Alert, no apparent focal deficits, confused   Access: RUE AVF with palpable thrill    Pertinent labs & Imagin-05    134[L]  |  95[L]  |  37[H]  ----------------------------<  163[H]  4.8   |  22  |  6.79[H]    Ca    8.6      05 Mar 2025 06:02  Phos  2.8       Mg     2.3         TPro  6.6  /  Alb  3.3  /  TBili  0.4  /  DBili      /  AST  12  /  ALT  <5[L]  /  AlkPhos  251[H]                            10.3   9.88  )-----------( 226      ( 05 Mar 2025 06:02 )             33.5       Urine Studies:  Urinalysis Basic - ( 05 Mar 2025 06:02 )    Color:  / Appearance:  / SG:  / pH:   Gluc: 163 mg/dL / Ketone:   / Bili:  / Urobili:    Blood:  / Protein:  / Nitrite:    Leuk Esterase:  / RBC:  / WBC    Sq Epi:  / Non Sq Epi:  / Bacteria:             MEDICATIONS  (STANDING):  aspirin enteric coated 81 milliGRAM(s) Oral daily  atorvastatin 40 milliGRAM(s) Oral at bedtime  carvedilol 3.125 milliGRAM(s) Oral every 12 hours  clopidogrel Tablet 75 milliGRAM(s) Oral daily  dextrose 5%. 1000 milliLiter(s) (50 mL/Hr) IV Continuous <Continuous>  dextrose 5%. 1000 milliLiter(s) (100 mL/Hr) IV Continuous <Continuous>  dextrose 50% Injectable 50 milliLiter(s) IV Push every 15 minutes  dextrose 50% Injectable 25 milliLiter(s) IV Push every 15 minutes  heparin   Injectable 5000 Unit(s) SubCutaneous every 8 hours  influenza  Vaccine (HIGH DOSE) 0.5 milliLiter(s) IntraMuscular once  insulin glargine Injectable (LANTUS) 8 Unit(s) SubCutaneous at bedtime  insulin lispro (ADMELOG) corrective regimen sliding scale   SubCutaneous three times a day before meals  insulin lispro (ADMELOG) corrective regimen sliding scale   SubCutaneous at bedtime  insulin lispro Injectable (ADMELOG) 2 Unit(s) SubCutaneous three times a day before meals  levETIRAcetam 500 milliGRAM(s) Oral every 12 hours  pantoprazole    Tablet 40 milliGRAM(s) Oral daily  polyethylene glycol 3350 17 Gram(s) Oral daily  senna 2 Tablet(s) Oral at bedtime  sodium chloride 0.9% lock flush 3 milliLiter(s) IV Push every 8 hours  sodium chloride 0.9%. 1000 milliLiter(s) (10 mL/Hr) IV Continuous <Continuous>   NEPHROLOGY PROGRESS NOTE:    Interval history:  Patient seen and examined at bedside. No active complaints.     Vitals:  T(F): 96.4 (25 @ 07:00), Max: 97.2 (25 @ 21:36)  HR: 74 (25 @ 04:00)  BP: 129/61 (25 @ 04:00)  RR: 17 (25 @ 04:00)  SpO2: 94% (25 @ 04:00)  Wt(kg): --    :  -   07:00  --------------------------------------------------------  IN: 600 mL / OUT: 0 mL / NET: 600 mL    :01  -   07:00  --------------------------------------------------------  IN: 165 mL / OUT: 0 mL / NET: 165 mL          PE:  General: Confused, on NC 2L/min   Chest: Diminished breath due to poor inspiratory effort  Heart: RRR, S1/S2 wnl, no MRG  Abdomen: Soft, nontender, nondistended   Extremities: No edema  Neuro:  Alert, no apparent focal deficits, confused   Access: RUE AVF with palpable thrill    Pertinent labs & Imagin-05    134[L]  |  95[L]  |  37[H]  ----------------------------<  163[H]  4.8   |  22  |  6.79[H]    Ca    8.6      05 Mar 2025 06:02  Phos  2.8       Mg     2.3         TPro  6.6  /  Alb  3.3  /  TBili  0.4  /  DBili      /  AST  12  /  ALT  <5[L]  /  AlkPhos  251[H]                            10.3   9.88  )-----------( 226      ( 05 Mar 2025 06:02 )             33.5       Urine Studies:  Urinalysis Basic - ( 05 Mar 2025 06:02 )    Color:  / Appearance:  / SG:  / pH:   Gluc: 163 mg/dL / Ketone:   / Bili:  / Urobili:    Blood:  / Protein:  / Nitrite:    Leuk Esterase:  / RBC:  / WBC    Sq Epi:  / Non Sq Epi:  / Bacteria:             MEDICATIONS  (STANDING):  aspirin enteric coated 81 milliGRAM(s) Oral daily  atorvastatin 40 milliGRAM(s) Oral at bedtime  carvedilol 3.125 milliGRAM(s) Oral every 12 hours  clopidogrel Tablet 75 milliGRAM(s) Oral daily  dextrose 5%. 1000 milliLiter(s) (50 mL/Hr) IV Continuous <Continuous>  dextrose 5%. 1000 milliLiter(s) (100 mL/Hr) IV Continuous <Continuous>  dextrose 50% Injectable 50 milliLiter(s) IV Push every 15 minutes  dextrose 50% Injectable 25 milliLiter(s) IV Push every 15 minutes  heparin   Injectable 5000 Unit(s) SubCutaneous every 8 hours  influenza  Vaccine (HIGH DOSE) 0.5 milliLiter(s) IntraMuscular once  insulin glargine Injectable (LANTUS) 8 Unit(s) SubCutaneous at bedtime  insulin lispro (ADMELOG) corrective regimen sliding scale   SubCutaneous three times a day before meals  insulin lispro (ADMELOG) corrective regimen sliding scale   SubCutaneous at bedtime  insulin lispro Injectable (ADMELOG) 2 Unit(s) SubCutaneous three times a day before meals  levETIRAcetam 500 milliGRAM(s) Oral every 12 hours  pantoprazole    Tablet 40 milliGRAM(s) Oral daily  polyethylene glycol 3350 17 Gram(s) Oral daily  senna 2 Tablet(s) Oral at bedtime  sodium chloride 0.9% lock flush 3 milliLiter(s) IV Push every 8 hours  sodium chloride 0.9%. 1000 milliLiter(s) (10 mL/Hr) IV Continuous <Continuous>

## 2025-03-05 NOTE — PROGRESS NOTE ADULT - SUBJECTIVE AND OBJECTIVE BOX
Patient discussed on morning rounds with Dr. Musa    OPERATION & DATE: POD 7 s/p CABGX2     SUBJECTIVE ASSESSMENT:    Patient reports to be doing well, denies acute complaints. OT/PT recommends he goes to acute rehab, he is persisting that he will not go. Patient to get HD today.    VITAL SIGNS:  Vital Signs Last 24 Hrs  T(C): 36.4 (05 Mar 2025 11:00), Max: 36.6 (05 Mar 2025 09:03)  T(F): 97.6 (05 Mar 2025 11:00), Max: 97.9 (05 Mar 2025 09:03)  HR: 72 (05 Mar 2025 11:00) (69 - 77)  BP: 146/65 (05 Mar 2025 11:00) (129/60 - 157/68)  BP(mean): 88 (05 Mar 2025 08:10) (86 - 98)  RR: 17 (05 Mar 2025 11:00) (16 - 18)  SpO2: 96% (05 Mar 2025 11:00) (91% - 97%)    Parameters below as of 05 Mar 2025 11:00  Patient On (Oxygen Delivery Method): nasal cannula  O2 Flow (L/min): 1    I&O's Detail    04 Mar 2025 07:01  -  05 Mar 2025 07:00  --------------------------------------------------------  IN:    Oral Fluid: 165 mL  Total IN: 165 mL    OUT:    Voided (mL): 0 mL  Total OUT: 0 mL    Total NET: 165 mL        CHEST TUBE: No    IDALIA DRAIN: No  EPICARDIAL WIRES: No   STITCHES: Yes, 1x tie down    PHYSICAL EXAM:  General: Sitting in bed comfortably in NAD  Neuro: A&Ox3, no focal deficits   HEENT: NCAT, EOMI   Cardiac: Regular rate and rhythm, normal S1 and S2. No m/r/g   Pulm: Breathing comfortably on room air. No signs of respiratory distress. Lungs are CTA b/l without wheezes, rales, or rhonchi   Abdomen: Soft, non-distended, non-tender. + bowel sounds   Extremities: Warm and well perfused, no peripheral edema, distal pulses 2+. No calf tenderness. SCDs and TEDs in place  MSK: Full AROM   Wound: Prevena removed, MSI is well approximated and without erythema or drainage. No sternal clicks. Chest tube insertion     LABS:                        10.3   9.88  )-----------( 226      ( 05 Mar 2025 06:02 )             33.5     PT/INR - ( 05 Mar 2025 06:02 )   PT: 11.3 sec;   INR: 0.97          PTT - ( 05 Mar 2025 06:02 )  PTT:29.5 sec  03-05    134[L]  |  95[L]  |  37[H]  ----------------------------<  163[H]  4.8   |  22  |  6.79[H]    Ca    8.6      05 Mar 2025 06:02  Phos  2.8     03-05  Mg     2.3     03-05    TPro  6.6  /  Alb  3.3  /  TBili  0.4  /  DBili  x   /  AST  12  /  ALT  <5[L]  /  AlkPhos  251[H]  03-05    Urinalysis Basic - ( 05 Mar 2025 06:02 )    Color: x / Appearance: x / SG: x / pH: x  Gluc: 163 mg/dL / Ketone: x  / Bili: x / Urobili: x   Blood: x / Protein: x / Nitrite: x   Leuk Esterase: x / RBC: x / WBC x   Sq Epi: x / Non Sq Epi: x / Bacteria: x      MEDICATIONS  (STANDING):  aspirin enteric coated 81 milliGRAM(s) Oral daily  atorvastatin 40 milliGRAM(s) Oral at bedtime  carvedilol 3.125 milliGRAM(s) Oral every 12 hours  clopidogrel Tablet 75 milliGRAM(s) Oral daily  dextrose 5%. 1000 milliLiter(s) (50 mL/Hr) IV Continuous <Continuous>  dextrose 5%. 1000 milliLiter(s) (100 mL/Hr) IV Continuous <Continuous>  dextrose 50% Injectable 50 milliLiter(s) IV Push every 15 minutes  dextrose 50% Injectable 25 milliLiter(s) IV Push every 15 minutes  heparin   Injectable 5000 Unit(s) SubCutaneous every 8 hours  influenza  Vaccine (HIGH DOSE) 0.5 milliLiter(s) IntraMuscular once  insulin glargine Injectable (LANTUS) 8 Unit(s) SubCutaneous at bedtime  insulin lispro (ADMELOG) corrective regimen sliding scale   SubCutaneous three times a day before meals  insulin lispro (ADMELOG) corrective regimen sliding scale   SubCutaneous at bedtime  insulin lispro Injectable (ADMELOG) 2 Unit(s) SubCutaneous three times a day before meals  levETIRAcetam 500 milliGRAM(s) Oral every 12 hours  pantoprazole    Tablet 40 milliGRAM(s) Oral daily  polyethylene glycol 3350 17 Gram(s) Oral daily  senna 2 Tablet(s) Oral at bedtime  sodium chloride 0.9% lock flush 3 milliLiter(s) IV Push every 8 hours  sodium chloride 0.9%. 1000 milliLiter(s) (10 mL/Hr) IV Continuous <Continuous>    MEDICATIONS  (PRN):  acetaminophen     Tablet .. 650 milliGRAM(s) Oral every 6 hours PRN Mild Pain (1 - 3)  dextrose Oral Gel 15 Gram(s) Oral once PRN Blood Glucose LESS THAN 70 milliGRAM(s)/deciliter  oxyCODONE    IR 5 milliGRAM(s) Oral every 6 hours PRN Moderate Pain (4 - 6)    RADIOLOGY & ADDITIONAL TESTS:       Patient discussed on morning rounds with Dr. Musa    OPERATION & DATE: POD 7 s/p CABGX2     SUBJECTIVE ASSESSMENT:    Patient reports to be doing well, denies acute complaints. OT/PT recommends he goes to acute rehab, he is persisting that he will not go. Patient to get HD today.    VITAL SIGNS:  Vital Signs Last 24 Hrs  T(C): 36.4 (05 Mar 2025 11:00), Max: 36.6 (05 Mar 2025 09:03)  T(F): 97.6 (05 Mar 2025 11:00), Max: 97.9 (05 Mar 2025 09:03)  HR: 72 (05 Mar 2025 11:00) (69 - 77)  BP: 146/65 (05 Mar 2025 11:00) (129/60 - 157/68)  BP(mean): 88 (05 Mar 2025 08:10) (86 - 98)  RR: 17 (05 Mar 2025 11:00) (16 - 18)  SpO2: 96% (05 Mar 2025 11:00) (91% - 97%)    Parameters below as of 05 Mar 2025 11:00  Patient On (Oxygen Delivery Method): nasal cannula  O2 Flow (L/min): 1    I&O's Detail    04 Mar 2025 07:01  -  05 Mar 2025 07:00  --------------------------------------------------------  IN:    Oral Fluid: 165 mL  Total IN: 165 mL    OUT:    Voided (mL): 0 mL  Total OUT: 0 mL    Total NET: 165 mL        CHEST TUBE: No    IDALIA DRAIN: No  EPICARDIAL WIRES: No   STITCHES: Yes, 1x tie down    PHYSICAL EXAM:  General: Sitting in bed comfortably in NAD  Neuro: A&Ox3, no focal deficits   HEENT: NCAT, EOMI   Cardiac: Regular rate and rhythm, normal S1 and S2. No m/r/g   Pulm: Breathing comfortably on room air. No signs of respiratory distress. Lungs are CTA b/l without wheezes, rales, or rhonchi   Abdomen: Soft, non-distended, non-tender. + bowel sounds   Extremities: Warm and well perfused, no peripheral edema, distal pulses 2+. No calf tenderness. SCDs and TEDs in place  MSK: Full AROM   Wound: Prevena removed, MSI is well approximated and without erythema or drainage. No sternal clicks. Chest tube insertion sites are dressed and without drainage.     LABS:                        10.3   9.88  )-----------( 226      ( 05 Mar 2025 06:02 )             33.5     PT/INR - ( 05 Mar 2025 06:02 )   PT: 11.3 sec;   INR: 0.97          PTT - ( 05 Mar 2025 06:02 )  PTT:29.5 sec  03-05    134[L]  |  95[L]  |  37[H]  ----------------------------<  163[H]  4.8   |  22  |  6.79[H]    Ca    8.6      05 Mar 2025 06:02  Phos  2.8     03-05  Mg     2.3     03-05    TPro  6.6  /  Alb  3.3  /  TBili  0.4  /  DBili  x   /  AST  12  /  ALT  <5[L]  /  AlkPhos  251[H]  03-05    Urinalysis Basic - ( 05 Mar 2025 06:02 )    Color: x / Appearance: x / SG: x / pH: x  Gluc: 163 mg/dL / Ketone: x  / Bili: x / Urobili: x   Blood: x / Protein: x / Nitrite: x   Leuk Esterase: x / RBC: x / WBC x   Sq Epi: x / Non Sq Epi: x / Bacteria: x      MEDICATIONS  (STANDING):  aspirin enteric coated 81 milliGRAM(s) Oral daily  atorvastatin 40 milliGRAM(s) Oral at bedtime  carvedilol 3.125 milliGRAM(s) Oral every 12 hours  clopidogrel Tablet 75 milliGRAM(s) Oral daily  dextrose 5%. 1000 milliLiter(s) (50 mL/Hr) IV Continuous <Continuous>  dextrose 5%. 1000 milliLiter(s) (100 mL/Hr) IV Continuous <Continuous>  dextrose 50% Injectable 50 milliLiter(s) IV Push every 15 minutes  dextrose 50% Injectable 25 milliLiter(s) IV Push every 15 minutes  heparin   Injectable 5000 Unit(s) SubCutaneous every 8 hours  influenza  Vaccine (HIGH DOSE) 0.5 milliLiter(s) IntraMuscular once  insulin glargine Injectable (LANTUS) 8 Unit(s) SubCutaneous at bedtime  insulin lispro (ADMELOG) corrective regimen sliding scale   SubCutaneous three times a day before meals  insulin lispro (ADMELOG) corrective regimen sliding scale   SubCutaneous at bedtime  insulin lispro Injectable (ADMELOG) 2 Unit(s) SubCutaneous three times a day before meals  levETIRAcetam 500 milliGRAM(s) Oral every 12 hours  pantoprazole    Tablet 40 milliGRAM(s) Oral daily  polyethylene glycol 3350 17 Gram(s) Oral daily  senna 2 Tablet(s) Oral at bedtime  sodium chloride 0.9% lock flush 3 milliLiter(s) IV Push every 8 hours  sodium chloride 0.9%. 1000 milliLiter(s) (10 mL/Hr) IV Continuous <Continuous>    MEDICATIONS  (PRN):  acetaminophen     Tablet .. 650 milliGRAM(s) Oral every 6 hours PRN Mild Pain (1 - 3)  dextrose Oral Gel 15 Gram(s) Oral once PRN Blood Glucose LESS THAN 70 milliGRAM(s)/deciliter  oxyCODONE    IR 5 milliGRAM(s) Oral every 6 hours PRN Moderate Pain (4 - 6)    RADIOLOGY & ADDITIONAL TESTS:

## 2025-03-05 NOTE — CONSULT NOTE ADULT - NSCONSULTADDITIONALINFOA_GEN_ALL_CORE
55 minutes spent on total encounter. The necessity of the time spent during the encounter on this date of service was due to:     Obtaining separately reported history including review of hospital course, relevant imaging, therapy notes, and consultant notes, performing medically appropriate examination, counseling and educating patient/ family/caregivers on rehabilitation course, care coordination, communication with primary team, documenting clinical information

## 2025-03-05 NOTE — PROGRESS NOTE ADULT - ASSESSMENT
83 yo male with a PMHx ESRD 9HD T/Thurs/Sat), CAD 9s/p PCI), T2DM, HTN, HLD, BPH, bladder diverticulum who presented to [facility] [place] with chest pain found with NSTEMI and cardiac cath which revealed 3v CAD     Neuro:   No delirium and focal deficits.   Pain:      Cardio:  POD ** s/p **  HTN:  HLD:   Vitals over the last 24 hrs:    HR:     BP:      Pulm:   POD ** s/p **  IS encouraged. Sating at ***  CXR:    GI:   Tolerating diet well   Prophylaxis: 40 mg pantoprazole. ***  Bowel: Senna and PEG. ***     ID:   WBC ***. Afebrile/Febrile.  Monitor fever curve     Renal:   BUN/Creat @ **  I/O net:   Electrolytes: Replete electrolytes prn.     Hem:   H&H @   DVT prophylaxis: SubQ Heparin ***    Endo:   A1C:  TSH:    MSK:   Ambulating well. Continue with PT/OT. ***    Disposition:   Continue with inpatient care. ***   83 yo male with a PMHx ESRD 9HD T/Thurs/Sat), CAD 9s/p PCI), T2DM, HTN, HLD, BPH, bladder diverticulum who presented to [facility] with chest pain found with NSTEMI and cardiac cath which revealed 3v CAD. Patient was started on a hep gtt and on 2/24/25, patient was transferred to [facility] under  for further management. On 2/26/25, patient underwent CABG x2 (LIMA-LAD, SVG-OM2), EF normal with . Intra-op, patient received 1.5L LR/ 500cc Albumin/ 2u PRBC, arrived to CTICU intubated on Levo/ Vaso. Got HD. POD1, patient was extubated overnight, remained on Levo and Matias while undergoing HD -2L, started on PO Midodrine 10mg q8hrs while still on pressors, substernal tube removed. POD2, patient lethargic, difficulty with ambulation, continued on pressors for cerebral perfusion. Received HD -2.5L. POD3, mental status improved, got HD ,received 1u PRBC, all drains removed except 1 mediastinal latrice, continued on pressors and Midodrine. POD4, weaned off pressors over night, ambulated well. POD5, started on Lopressor 25mg BID, last mediastinal latrice removed, moderate L pleural effusion on POCUS, got HD. POD6, Lopressor increased, POCUS w/ similar appearing L pleural effusion without window. POD 7 Aggressive ambulation today. HD session today. OT/PT recommending acute rehab, starting auth. Prevena removed today. Maintain SBP >140 for cerebral profusion. Lopressor stopped and carvedilol 3.25 started.       Neuro:   No delirium and focal deficits.   Pain: c/w oxy and tylneol  Possible Hx of CVA w/ seizure    - c/w 500 mg keppra BID   - Patient with AMS with drop in BP, keep SBP >140.      Cardio:  3VCAD s/p POD 5 CABG x2 (LIMA-LAD, SVG-OM2)   - c/w ASA and lipitor 40 mg daily. Started 3.25 mg coreg, per renal recs. Stopped Lopressor   HLD: c/w statin   Vitals stable over the last 24 hrs     Pulm:   IS encouraged. Sating at 98% on 1L NC  CXR: Stable L effusion, POCUS yesterday without window     GI:   Tolerating diet well   Prophylaxis: 40 mg pantoprazole.   Bowel: Senna and PEG.     ID:   WBC 9. Afebrile  Monitor fever curve     Renal:   ESRD (HD T,Thurs,Sat)   - HD today   - BUN/Creat @ 37/6.79  I/O net: +100  Electrolytes: Replete electrolytes prn.     Hem:   H&H @ 10/33   DVT prophylaxis: SubQ Heparin     Endo:   T2DM (A1c: 7.7%): on home meds, held inpatient  Remains on Lantus 8 units nightly, Lispro 2 units w/ meals, ISS.   Monitoring glucose, adjust regimen prn   -TSH: 0.514, no hx    MSK:   Ambulating well. Continue with PT/OT. Recommending acute rehab     Disposition:   Continue with inpatient care. Monitor pleural effusions. Ongoing post-op pushing. HD today

## 2025-03-05 NOTE — CONSULT NOTE ADULT - SUBJECTIVE AND OBJECTIVE BOX
HPI:  This is a 83 yo M with ESRD on HD TTS, coronary artery disease s/p PCI, Type 2 diabetes, hypertension, hyperlipidemia, benign prostate hypertrophy, bladder diverticulum, and chronic hip pain who presented to [facility] complaining of right sided chest pain.  Patient was found to have NSTEMI.  He underwent a cardiac cath which showed 3v CAD.  He was started on heparin drip and transferred to [facility].  Patient reports last dialysis was saturday.  Patient denies chest pain, shortness of breath, nausea, vomiting on arrival. (24 Feb 2025 02:38)     2/26/25 underwent CABG x2 (LIMA-LAD, SVG-OM2), EF normal with . Extubated POD1. Resumed HD sessions. Found to have Left pleural effusion. Hemodynamically stable and HR controlled. Chest tube removed.  PT/OT recommended AR. PM&R consulted for evaluation of rehab needs.  -----------------------------------------------------------------------  SUBJECTIVE:      -----------------------------------------------------------------------  REVIEW OF SYSTEMS  Constitutional - No fever,  +fatigue  Neurological -   Pain -   Bowel -   Bladder -   -----------------------------------------------------------------------  FUNCTIONAL HISTORY:  Lives with his wife and grandchildren in a private home with 3 BLANCO.  Family assists with some ADLs/ iADLs. Uses cane for ambulation    CURRENT FUNCTIONAL STATUS:    Physical Therapy: 3/3      Bed Mobility  Bed Mobility Training Sit-to-Supine: moderate assist (50% patient effort);  2 person assist;  verbal cues;  bed rails  Bed Mobility Training Supine-to-Sit: not assessed; patient received seated in bedside chair  Bed Mobility Training Limitations: decreased ability to use legs for bridging/pushing;  impaired ability to control trunk for mobility;  decreased ability to use arms for pushing/pulling;  decreased flexibility;  decreased ROM;  decreased strength;  impaired balance;  impaired postural control    Sit-Stand Transfer Training  Transfer Training Sit-to-Stand Transfer: minimum assist (75% patient effort);  2 person assist;  verbal cues;  rolling walker  Transfer Training Stand-to-Sit Transfer: minimum assist (75% patient effort);  2 person assist;  verbal cues;  rolling walker  Sit-to-Stand Transfer Training Transfer Safety Analysis: decreased balance;  decreased weight-shifting ability;  Performed sit<>stand x 3 trials. Demo fair eccentric control during descend;  decreased flexibility;  decreased ROM;  decreased strength;  impaired balance;  impaired postural control    Gait Training  Gait Training: minimum assist (75% patient effort);  moderate assist (50% patient effort);  2 person assist;  verbal cues;  rolling walker;  20 feet x 3  Gait Analysis: decreased alex;  crouch;  increased time in double stance;  decreased hip/knee flexion;  decreased velocity of limb motion;  retropulsion;  shuffling;  decreased step length;  decreased stride length;  decreased toe clearance;  decreased weight-shifting ability;  decreased flexibility;  decreased ROM;  decreased strength;  impaired balance;  impaired postural control;  20 feet x 3;  rolling walker  Gait Number of Times:: x 3  Type of Rest Type of Rest: sitting  Duration of Rest Duration of Rest: 2 minutes       Occupational Therapy: 3/3      Cognitive/Neuro/Behavioral  Cognitive/Neuro/Behavioral [WDL Definition: Alert; opens eyes spontaneously; arouses to voice or touch; oriented x 4; follows commands; speech spontaneous, logical; purposeful motor response; behavior appropriate to situation]: WDL  Level of Consciousness: alert  Arousal Level: arouses to voice  Orientation: disoriented x 4;  time;  month  Speech: clear;  spontaneous  Mood/Behavior: calm;  cooperative    Language Assistance  Preferred Language to Address Healthcare Preferred Language to Address Healthcare: English    Therapeutic Interventions      Bed Mobility  Bed Mobility Training Scooting: contact guard;  nonverbal cues (demo/gestures);  verbal cues  Bed Mobility Training Sit-to-Supine: moderate assist (50% patient effort);  2 person assist;  verbal cues;  nonverbal cues (demo/gestures)  Bed Mobility Training Limitations: impaired postural control;  impaired balance;  decreased strength;  impaired ability to control trunk for mobility;  decreased ability to use legs for bridging/pushing;  decreased ability to use arms for pushing/pulling;  decreased flexibility    Sit-Stand Transfer Training  Transfer Training Sit-to-Stand Transfer: minimum assist (75% patient effort);  2 person assist;  verbal cues;  nonverbal cues (demo/gestures);  full weight-bearing   rolling walker  Transfer Training Stand-to-Sit Transfer: minimum assist (75% patient effort);  2 person assist;  nonverbal cues (demo/gestures);  verbal cues;  full weight-bearing   rolling walker  Sit-to-Stand Transfer Training Transfer Safety Analysis: decreased weight-shifting ability;  decreased step length;  decreased balance;  impaired postural control;  decreased strength;  impaired balance;  decreased flexibility    Therapeutic Exercise  Therapeutic Exercise Charges: pt. performed functional mob in hallway with Min Ax2 and RW, demo hunched posture, requires Mod VCS for safe use of AD, pt. required 2 seated rest breaks, impulsive with sitting     Lower Body Dressing Training  Lower Body Dressing Training Charges: adjust b/l socks seated in chair   Lower Body Dressing Training Assistance: minimum assist (75% patient effort);  nonverbal cues (demo/gestures);  1 person assist;  verbal cues;  decreased flexibility;  impaired postural control            Speech Therapy:    -----------------------------------------------------------------------  PAST MEDICAL & SURGICAL HISTORY  HTN (hypertension)    Gout    DM (diabetes mellitus)    History of BPH    HLD (hyperlipidemia)    CAD (coronary artery disease)    End stage renal disease    History of cholecystectomy    S/P arteriovenous (AV) fistula creation      FAMILY HISTORY   Known health problems: none (Father, Mother)      SOCIAL HISTORY  As above  -----------------------------------------------------------------------  VITALS  T(C): 35.8 (03-05-25 @ 07:00), Max: 36.2 (03-04-25 @ 21:36)  HR: 74 (03-05-25 @ 04:00) (69 - 83)  BP: 129/61 (03-05-25 @ 04:00) (119/58 - 157/68)  RR: 17 (03-05-25 @ 04:00) (16 - 18)  SpO2: 94% (03-05-25 @ 04:00) (91% - 97%)  Wt(kg): --    PHYSICAL EXAM    -----------------------------------------------------------------------  RECENT LABS  CBC Full  -  ( 05 Mar 2025 06:02 )  WBC Count : 9.88 K/uL  RBC Count : 3.34 M/uL  Hemoglobin : 10.3 g/dL  Hematocrit : 33.5 %  Platelet Count - Automated : 226 K/uL  Mean Cell Volume : 100.3 fl  Mean Cell Hemoglobin : 30.8 pg  Mean Cell Hemoglobin Concentration : 30.7 g/dL  Auto Neutrophil # : x  Auto Lymphocyte # : x  Auto Monocyte # : x  Auto Eosinophil # : x  Auto Basophil # : x  Auto Neutrophil % : x  Auto Lymphocyte % : x  Auto Monocyte % : x  Auto Eosinophil % : x  Auto Basophil % : x    03-05    134[L]  |  95[L]  |  37[H]  ----------------------------<  163[H]  4.8   |  22  |  6.79[H]    Ca    8.6      05 Mar 2025 06:02  Phos  2.8     03-05  Mg     2.3     03-05    TPro  6.6  /  Alb  3.3  /  TBili  0.4  /  DBili  x   /  AST  12  /  ALT  <5[L]  /  AlkPhos  251[H]  03-05    Urinalysis Basic - ( 05 Mar 2025 06:02 )    Color: x / Appearance: x / SG: x / pH: x  Gluc: 163 mg/dL / Ketone: x  / Bili: x / Urobili: x   Blood: x / Protein: x / Nitrite: x   Leuk Esterase: x / RBC: x / WBC x   Sq Epi: x / Non Sq Epi: x / Bacteria: x      -----------------------------------------------------------------------  IMAGING:    -----------------------------------------------------------------------  ALLERGIES  No Known Allergies      MEDICATIONS   acetaminophen     Tablet .. 650 milliGRAM(s) Oral every 6 hours PRN  aspirin enteric coated 81 milliGRAM(s) Oral daily  atorvastatin 40 milliGRAM(s) Oral at bedtime  carvedilol 3.125 milliGRAM(s) Oral every 12 hours  clopidogrel Tablet 75 milliGRAM(s) Oral daily  dextrose 5%. 1000 milliLiter(s) IV Continuous <Continuous>  dextrose 5%. 1000 milliLiter(s) IV Continuous <Continuous>  dextrose 50% Injectable 50 milliLiter(s) IV Push every 15 minutes  dextrose 50% Injectable 25 milliLiter(s) IV Push every 15 minutes  dextrose Oral Gel 15 Gram(s) Oral once PRN  heparin   Injectable 5000 Unit(s) SubCutaneous every 8 hours  influenza  Vaccine (HIGH DOSE) 0.5 milliLiter(s) IntraMuscular once  insulin glargine Injectable (LANTUS) 8 Unit(s) SubCutaneous at bedtime  insulin lispro (ADMELOG) corrective regimen sliding scale   SubCutaneous three times a day before meals  insulin lispro (ADMELOG) corrective regimen sliding scale   SubCutaneous at bedtime  insulin lispro Injectable (ADMELOG) 2 Unit(s) SubCutaneous three times a day before meals  levETIRAcetam 500 milliGRAM(s) Oral every 12 hours  oxyCODONE    IR 5 milliGRAM(s) Oral every 6 hours PRN  pantoprazole    Tablet 40 milliGRAM(s) Oral daily  polyethylene glycol 3350 17 Gram(s) Oral daily  senna 2 Tablet(s) Oral at bedtime  sodium chloride 0.9% lock flush 3 milliLiter(s) IV Push every 8 hours  sodium chloride 0.9%. 1000 milliLiter(s) IV Continuous <Continuous>    -----------------------------------------------------------------------   HPI:  This is a 85 yo M with ESRD on HD TTS, coronary artery disease s/p PCI, Type 2 diabetes, hypertension, hyperlipidemia, benign prostate hypertrophy, bladder diverticulum, and chronic hip pain who presented to [facility] complaining of right sided chest pain.  Patient was found to have NSTEMI.  He underwent a cardiac cath which showed 3v CAD.  He was started on heparin drip and transferred to [facility].  Patient reports last dialysis was saturday.  Patient denies chest pain, shortness of breath, nausea, vomiting on arrival. (24 Feb 2025 02:38)     2/26/25 underwent CABG x2 (LIMA-LAD, SVG-OM2), EF normal with . Extubated POD1. Resumed HD sessions. Found to have Left pleural effusion. Hemodynamically stable and HR controlled. Chest tube removed.  PT/OT recommended AR. PM&R consulted for evaluation of rehab needs.  -----------------------------------------------------------------------  SUBJECTIVE:  Patient seen and evaluated this AM. Sitting up in chair eating breakfast. Reports he is doing well. Discussed recommendation for AR, however patient is adamant that he would like to return home as he feels that he has adequate help and supervision.  -----------------------------------------------------------------------  REVIEW OF SYSTEMS  Constitutional - No fever,  +fatigue  Neurological - no focal weakness  Pain - chest discomfort  Bowel - no issues  Bladder - on HD  -----------------------------------------------------------------------  FUNCTIONAL HISTORY:  Lives with his wife and grandchildren in a private home with 3 BLANCO.  Family assists with some ADLs/ iADLs. Uses cane for ambulation    CURRENT FUNCTIONAL STATUS:    Physical Therapy: 3/3      Bed Mobility  Bed Mobility Training Sit-to-Supine: moderate assist (50% patient effort);  2 person assist;  verbal cues;  bed rails  Bed Mobility Training Supine-to-Sit: not assessed; patient received seated in bedside chair  Bed Mobility Training Limitations: decreased ability to use legs for bridging/pushing;  impaired ability to control trunk for mobility;  decreased ability to use arms for pushing/pulling;  decreased flexibility;  decreased ROM;  decreased strength;  impaired balance;  impaired postural control    Sit-Stand Transfer Training  Transfer Training Sit-to-Stand Transfer: minimum assist (75% patient effort);  2 person assist;  verbal cues;  rolling walker  Transfer Training Stand-to-Sit Transfer: minimum assist (75% patient effort);  2 person assist;  verbal cues;  rolling walker  Sit-to-Stand Transfer Training Transfer Safety Analysis: decreased balance;  decreased weight-shifting ability;  Performed sit<>stand x 3 trials. Demo fair eccentric control during descend;  decreased flexibility;  decreased ROM;  decreased strength;  impaired balance;  impaired postural control    Gait Training  Gait Training: minimum assist (75% patient effort);  moderate assist (50% patient effort);  2 person assist;  verbal cues;  rolling walker;  20 feet x 3  Gait Analysis: decreased alex;  crouch;  increased time in double stance;  decreased hip/knee flexion;  decreased velocity of limb motion;  retropulsion;  shuffling;  decreased step length;  decreased stride length;  decreased toe clearance;  decreased weight-shifting ability;  decreased flexibility;  decreased ROM;  decreased strength;  impaired balance;  impaired postural control;  20 feet x 3;  rolling walker  Gait Number of Times:: x 3  Type of Rest Type of Rest: sitting  Duration of Rest Duration of Rest: 2 minutes       Occupational Therapy: 3/3      Cognitive/Neuro/Behavioral  Cognitive/Neuro/Behavioral [WDL Definition: Alert; opens eyes spontaneously; arouses to voice or touch; oriented x 4; follows commands; speech spontaneous, logical; purposeful motor response; behavior appropriate to situation]: WDL  Level of Consciousness: alert  Arousal Level: arouses to voice  Orientation: disoriented x 4;  time;  month  Speech: clear;  spontaneous  Mood/Behavior: calm;  cooperative    Language Assistance  Preferred Language to Address Healthcare Preferred Language to Address Healthcare: English    Therapeutic Interventions      Bed Mobility  Bed Mobility Training Scooting: contact guard;  nonverbal cues (demo/gestures);  verbal cues  Bed Mobility Training Sit-to-Supine: moderate assist (50% patient effort);  2 person assist;  verbal cues;  nonverbal cues (demo/gestures)  Bed Mobility Training Limitations: impaired postural control;  impaired balance;  decreased strength;  impaired ability to control trunk for mobility;  decreased ability to use legs for bridging/pushing;  decreased ability to use arms for pushing/pulling;  decreased flexibility    Sit-Stand Transfer Training  Transfer Training Sit-to-Stand Transfer: minimum assist (75% patient effort);  2 person assist;  verbal cues;  nonverbal cues (demo/gestures);  full weight-bearing   rolling walker  Transfer Training Stand-to-Sit Transfer: minimum assist (75% patient effort);  2 person assist;  nonverbal cues (demo/gestures);  verbal cues;  full weight-bearing   rolling walker  Sit-to-Stand Transfer Training Transfer Safety Analysis: decreased weight-shifting ability;  decreased step length;  decreased balance;  impaired postural control;  decreased strength;  impaired balance;  decreased flexibility    Therapeutic Exercise  Therapeutic Exercise Charges: pt. performed functional mob in hallway with Min Ax2 and RW, demo hunched posture, requires Mod VCS for safe use of AD, pt. required 2 seated rest breaks, impulsive with sitting     Lower Body Dressing Training  Lower Body Dressing Training Charges: adjust b/l socks seated in chair   Lower Body Dressing Training Assistance: minimum assist (75% patient effort);  nonverbal cues (demo/gestures);  1 person assist;  verbal cues;  decreased flexibility;  impaired postural control            Speech Therapy:    -----------------------------------------------------------------------  PAST MEDICAL & SURGICAL HISTORY  HTN (hypertension)    Gout    DM (diabetes mellitus)    History of BPH    HLD (hyperlipidemia)    CAD (coronary artery disease)    End stage renal disease    History of cholecystectomy    S/P arteriovenous (AV) fistula creation      FAMILY HISTORY   Known health problems: none (Father, Mother)      SOCIAL HISTORY  As above  -----------------------------------------------------------------------  VITALS  T(C): 35.8 (03-05-25 @ 07:00), Max: 36.2 (03-04-25 @ 21:36)  HR: 74 (03-05-25 @ 04:00) (69 - 83)  BP: 129/61 (03-05-25 @ 04:00) (119/58 - 157/68)  RR: 17 (03-05-25 @ 04:00) (16 - 18)  SpO2: 94% (03-05-25 @ 04:00) (91% - 97%)  Wt(kg): --    PHYSICAL EXAM  General - NAD, Comfortable  Chest - Breathing comfortably, No Respiratory distress, on supplemental O2  Cardiovascular - Regular pulse  Abdomen - No abdominal distension  Extremities - No deformities of upper or lower limbs.  MSK - ROM within functional limits  Neurologic Exam -                    Cognitive - Awake, Alert, AAO to self, place, date, year, situation; follows commands     Communication - Fluent, No dysarthria, repetition intact, attention/concentration intact     Cranial Nerves -  EOMI, No facial asymmetry     Motor -                     LEFT    UE - ShAB 5/5, EF 5/5, EE 5/5, WE 5/5,  5/5                    LEFT    LE - HF 5/5, KE 5/5, DF 5/5, PF 5/5                    RIGHT UE - ShAB 5/5, EF 5/5, EE 5/5, WE 5/5,  5/5                    RIGHT LE - HF 5/5, KE 5/5, DF 5/5, PF 5/5        Tone - normal     Sensory - grossly intact to LT  Psychiatric - Mood stable, Affect WNL   -----------------------------------------------------------------------  RECENT LABS  CBC Full  -  ( 05 Mar 2025 06:02 )  WBC Count : 9.88 K/uL  RBC Count : 3.34 M/uL  Hemoglobin : 10.3 g/dL  Hematocrit : 33.5 %  Platelet Count - Automated : 226 K/uL  Mean Cell Volume : 100.3 fl  Mean Cell Hemoglobin : 30.8 pg  Mean Cell Hemoglobin Concentration : 30.7 g/dL  Auto Neutrophil # : x  Auto Lymphocyte # : x  Auto Monocyte # : x  Auto Eosinophil # : x  Auto Basophil # : x  Auto Neutrophil % : x  Auto Lymphocyte % : x  Auto Monocyte % : x  Auto Eosinophil % : x  Auto Basophil % : x    03-05    134[L]  |  95[L]  |  37[H]  ----------------------------<  163[H]  4.8   |  22  |  6.79[H]    Ca    8.6      05 Mar 2025 06:02  Phos  2.8     03-05  Mg     2.3     03-05    TPro  6.6  /  Alb  3.3  /  TBili  0.4  /  DBili  x   /  AST  12  /  ALT  <5[L]  /  AlkPhos  251[H]  03-05    Urinalysis Basic - ( 05 Mar 2025 06:02 )    Color: x / Appearance: x / SG: x / pH: x  Gluc: 163 mg/dL / Ketone: x  / Bili: x / Urobili: x   Blood: x / Protein: x / Nitrite: x   Leuk Esterase: x / RBC: x / WBC x   Sq Epi: x / Non Sq Epi: x / Bacteria: x      -----------------------------------------------------------------------  IMAGING:  < from: Xray Chest 1 View- PORTABLE-Routine (Xray Chest 1 View- PORTABLE-Routine in AM.) (03.05.25 @ 05:50) >  IMPRESSION:    Small left pleural effusion and/or partial atelectasis similar to   minimally increased versus pleural fluid shifting compared to prior exam   3/4/2025. Otherwise unchanged. No pneumothorax.    < end of copied text >    -----------------------------------------------------------------------  ALLERGIES  No Known Allergies      MEDICATIONS   acetaminophen     Tablet .. 650 milliGRAM(s) Oral every 6 hours PRN  aspirin enteric coated 81 milliGRAM(s) Oral daily  atorvastatin 40 milliGRAM(s) Oral at bedtime  carvedilol 3.125 milliGRAM(s) Oral every 12 hours  clopidogrel Tablet 75 milliGRAM(s) Oral daily  dextrose 5%. 1000 milliLiter(s) IV Continuous <Continuous>  dextrose 5%. 1000 milliLiter(s) IV Continuous <Continuous>  dextrose 50% Injectable 50 milliLiter(s) IV Push every 15 minutes  dextrose 50% Injectable 25 milliLiter(s) IV Push every 15 minutes  dextrose Oral Gel 15 Gram(s) Oral once PRN  heparin   Injectable 5000 Unit(s) SubCutaneous every 8 hours  influenza  Vaccine (HIGH DOSE) 0.5 milliLiter(s) IntraMuscular once  insulin glargine Injectable (LANTUS) 8 Unit(s) SubCutaneous at bedtime  insulin lispro (ADMELOG) corrective regimen sliding scale   SubCutaneous three times a day before meals  insulin lispro (ADMELOG) corrective regimen sliding scale   SubCutaneous at bedtime  insulin lispro Injectable (ADMELOG) 2 Unit(s) SubCutaneous three times a day before meals  levETIRAcetam 500 milliGRAM(s) Oral every 12 hours  oxyCODONE    IR 5 milliGRAM(s) Oral every 6 hours PRN  pantoprazole    Tablet 40 milliGRAM(s) Oral daily  polyethylene glycol 3350 17 Gram(s) Oral daily  senna 2 Tablet(s) Oral at bedtime  sodium chloride 0.9% lock flush 3 milliLiter(s) IV Push every 8 hours  sodium chloride 0.9%. 1000 milliLiter(s) IV Continuous <Continuous>    -----------------------------------------------------------------------

## 2025-03-06 LAB
RAPID RVP RESULT: SIGNIFICANT CHANGE UP
SARS-COV-2 RNA SPEC QL NAA+PROBE: SIGNIFICANT CHANGE UP

## 2025-03-06 PROCEDURE — 76604 US EXAM CHEST: CPT | Mod: 26

## 2025-03-06 PROCEDURE — 71045 X-RAY EXAM CHEST 1 VIEW: CPT | Mod: 26

## 2025-03-06 RX ORDER — BISACODYL 5 MG
5 TABLET, DELAYED RELEASE (ENTERIC COATED) ORAL AT BEDTIME
Refills: 0 | Status: DISCONTINUED | OUTPATIENT
Start: 2025-03-06 | End: 2025-03-10

## 2025-03-06 RX ADMIN — Medication 2 TABLET(S): at 21:22

## 2025-03-06 RX ADMIN — Medication 5 MILLIGRAM(S): at 21:22

## 2025-03-06 RX ADMIN — HEPARIN SODIUM 5000 UNIT(S): 1000 INJECTION INTRAVENOUS; SUBCUTANEOUS at 14:11

## 2025-03-06 RX ADMIN — CARVEDILOL 3.12 MILLIGRAM(S): 3.12 TABLET, FILM COATED ORAL at 05:48

## 2025-03-06 RX ADMIN — CARVEDILOL 3.12 MILLIGRAM(S): 3.12 TABLET, FILM COATED ORAL at 19:03

## 2025-03-06 RX ADMIN — Medication 40 MILLIGRAM(S): at 11:01

## 2025-03-06 RX ADMIN — Medication 650 MILLIGRAM(S): at 05:57

## 2025-03-06 RX ADMIN — Medication 3 MILLILITER(S): at 21:33

## 2025-03-06 RX ADMIN — CLOPIDOGREL BISULFATE 75 MILLIGRAM(S): 75 TABLET, FILM COATED ORAL at 11:01

## 2025-03-06 RX ADMIN — Medication 81 MILLIGRAM(S): at 11:01

## 2025-03-06 RX ADMIN — INSULIN GLARGINE-YFGN 8 UNIT(S): 100 INJECTION, SOLUTION SUBCUTANEOUS at 21:40

## 2025-03-06 RX ADMIN — INSULIN LISPRO 2 UNIT(S): 100 INJECTION, SOLUTION INTRAVENOUS; SUBCUTANEOUS at 11:26

## 2025-03-06 RX ADMIN — LEVETIRACETAM 500 MILLIGRAM(S): 10 INJECTION, SOLUTION INTRAVENOUS at 19:03

## 2025-03-06 RX ADMIN — ATORVASTATIN CALCIUM 40 MILLIGRAM(S): 80 TABLET, FILM COATED ORAL at 21:22

## 2025-03-06 RX ADMIN — Medication 3 MILLILITER(S): at 05:57

## 2025-03-06 RX ADMIN — INSULIN LISPRO 2: 100 INJECTION, SOLUTION INTRAVENOUS; SUBCUTANEOUS at 16:41

## 2025-03-06 RX ADMIN — INSULIN LISPRO 2: 100 INJECTION, SOLUTION INTRAVENOUS; SUBCUTANEOUS at 11:26

## 2025-03-06 RX ADMIN — INSULIN LISPRO 2 UNIT(S): 100 INJECTION, SOLUTION INTRAVENOUS; SUBCUTANEOUS at 16:40

## 2025-03-06 RX ADMIN — LEVETIRACETAM 500 MILLIGRAM(S): 10 INJECTION, SOLUTION INTRAVENOUS at 05:48

## 2025-03-06 RX ADMIN — Medication 650 MILLIGRAM(S): at 03:10

## 2025-03-06 RX ADMIN — Medication 3 MILLILITER(S): at 18:57

## 2025-03-06 RX ADMIN — HEPARIN SODIUM 5000 UNIT(S): 1000 INJECTION INTRAVENOUS; SUBCUTANEOUS at 05:48

## 2025-03-06 RX ADMIN — POLYETHYLENE GLYCOL 3350 17 GRAM(S): 17 POWDER, FOR SOLUTION ORAL at 11:01

## 2025-03-06 RX ADMIN — HEPARIN SODIUM 5000 UNIT(S): 1000 INJECTION INTRAVENOUS; SUBCUTANEOUS at 21:22

## 2025-03-06 NOTE — PROCEDURE NOTE - NSUS ED PROCEDURE ASSISTED BY
The procedure was performed independently
The procedure was performed independently
Assistance was available/The procedure was performed independently

## 2025-03-06 NOTE — PROCEDURE NOTE - NSUS ED ADDITIONAL DETAIL1 FT
Moderate L sided effusion spanning 2 rib spaces, no large window for drainage. O2 sat 95% on RA. Will continue to monitor.
L lung POCUS w/ small pleural effusion, lung sliding into window ~300cc  No fluid to R lung

## 2025-03-06 NOTE — PROGRESS NOTE ADULT - ASSESSMENT
83 yo male with a PMHx ESRD 9HD T/Thurs/Sat), CAD 9s/p PCI), T2DM, HTN, HLD, BPH, bladder diverticulum who presented to [facility] with chest pain found with NSTEMI and cardiac cath which revealed 3v CAD. Patient was started on a hep gtt and on 2/24/25, patient was transferred to [facility] under  for further management. On 2/26/25, patient underwent CABG x2 (LIMA-LAD, SVG-OM2), EF normal with . Intra-op, patient received 1.5L LR/ 500cc Albumin/ 2u PRBC, arrived to CTICU intubated on Levo/ Vaso. Got HD. POD1, patient was extubated overnight, remained on Levo and Matias while undergoing HD -2L, started on PO Midodrine 10mg q8hrs while still on pressors, substernal tube removed. POD2, patient lethargic, difficulty with ambulation, continued on pressors for cerebral perfusion. Received HD -2.5L. POD3, mental status improved, got HD ,received 1u PRBC, all drains removed except 1 mediastinal latrice, continued on pressors and Midodrine. POD4, weaned off pressors over night, ambulated well. POD5, started on Lopressor 25mg BID, last mediastinal latrice removed, moderate L pleural effusion on POCUS, got HD. POD6, Lopressor increased, POCUS w/ similar appearing L pleural effusion without window. POD7, Ambulated, received HD session, OT/PT recommending acute rehab, auth started. Prevena removed. Maintain SBP >140 for cerebral profusion. Lopressor stopped and carvedilol 3.25 started. POD8, Will plan to cut wires, will encourage ambulation, pursue auth for rehab and US L lung base.     A/P:  Neurovascular: No delirium. Pain well controlled with current regimen.  -PRN's.  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      Cardiovascular: Hemodynamically stable. HR controlled.  -CAD, s/p CABG x2 (LIMA-LAD, SVG-OM2), EF normal (2/26/25)  C/w ASA, plavix, Coreg 3.125mg BID, Lipitor 40mg daily    C/w ERP   Will work on IS, ambulation   -HLD: c/w Lipitor 40mg daily     Respiratory: 02 Sat = 98% on RA.  -If on oxygen wean to RA from for O2 Sat > 93%.  -Encourage C+DB and Use of IS 10x / hr while awake.  -CXR: small L pleural effusion   Ongoing diuresis via HD sessions  Will US L lung base    GI: Stable.  -PPX: pantoprazole 40mg daily   -PO Diet.  -Transaminitis: no abd pain, trending Alk Phos   -Bowel regimen: miralax, senna, dulcolax    Renal / :  -Monitor renal function: BUN 37, Cr 6.79  -Monitor I/O's.  -ESRD (on HD t/thurs/sat): last got HD on 3/3/35   Nephrology following, appreciate recs  Got HD on 3/5/25  Makes very minimal urine per patient, none documented in chart and on bladder scan, nothing in bladder  Has not been getting diuresed w/ oral meds     Endocrine:    -T2DM (A1c: 7.7%): on home meds, held inpatient  Remains on Lantus 8 units nightly, Lispro 2 units w/ meals, ISS.   Monitoring glucose, adjust regimen prn   -TSH: 0.514, no hx    Hematologic:  -CBC: RBC 3.34, Hgb 10.3, Plt 226 (3/5/25)  -Coagulation Panel: PTT 29.5, PT 11.3, INR .97 (3/5/25)    ID:  -Tempature: afebrile   -CBC: WBC 9.88 (3/5/25)  -Observe for SIRS/Sepsis Syndrome.    Prophylaxis:  -DVT prophylaxis with 5000 SubQ Heparin q8h.  -SCD's    Disposition:  -Telemetry, planned for acute rehab planning, planned to cut wires today, will US L lung base.

## 2025-03-06 NOTE — PROGRESS NOTE ADULT - SUBJECTIVE AND OBJECTIVE BOX
Patient discussed on morning rounds with Dr. Musa     Operation / Date:   S/p CABG x2 (LIMA-LAD, SVG-OM2), EF normal (2/26/25)    SUBJECTIVE ASSESSMENT:  84y Male without complaints, notes he had BMs 2 days ago, denies abd pain n/v/d. Is A&Ox3. Denies fever, chills, chest pain, sob, abd pain. Notes he is voiding freely.     Vital Signs Last 24 Hrs  T(C): 36.2 (06 Mar 2025 05:00), Max: 36.9 (05 Mar 2025 14:31)  T(F): 97.1 (06 Mar 2025 05:00), Max: 98.4 (05 Mar 2025 14:31)  HR: 75 (06 Mar 2025 05:15) (72 - 88)  BP: 132/60 (06 Mar 2025 05:15) (129/58 - 155/67)  BP(mean): 87 (06 Mar 2025 05:15) (80 - 96)  RR: 18 (06 Mar 2025 05:15) (17 - 18)  SpO2: 94% (06 Mar 2025 05:15) (94% - 96%)    Parameters below as of 06 Mar 2025 05:15  Patient On (Oxygen Delivery Method): nasal cannula  O2 Flow (L/min): 2    I&O's Detail    05 Mar 2025 07:01  -  06 Mar 2025 07:00  --------------------------------------------------------  IN:    Oral Fluid: 440 mL  Total IN: 440 mL    OUT:    Other (mL): 1700 mL  Total OUT: 1700 mL    Total NET: -1260 mL    CHEST TUBE:  No.   ORI DRAIN:  No.  EPICARDIAL WIRES: Yes  TIE DOWNS: Yes  NIXON: No.    PHYSICAL EXAM:  Appearance: No acute distress.  Neurologic: AAOx3, no AMS or focal deficits.  Responds appropriately to verbal and physical stimuli; exhibits purposeful movement in all extremities.  Cardiovascular: RRR, S1 S2. No m/r/g.  Respiratory: +decreased breaths to L lung base. No acute respiratory distress. CTA b/l, no w/r/r.   Gastrointestinal:  Soft, non-tender, non-distended, + BS.	  Skin: No rashes. No ecchymoses. No cyanosis.  Extremities: +b/l cool. Exhibits normal range of motion, no clubbing, cyanosis or edema.  Vascular: Peripheral pulses palpable 2+ bilaterally.  Incision Sites: +LLE GSV harvest site well approximated, no obvious swelling. +MSI well approximated no sternal clicks, erythema. Ori and CT removal sites c/d/i.     LABS:                        10.3   9.88  )-----------( 226      ( 05 Mar 2025 06:02 )             33.5     PT/INR - ( 05 Mar 2025 06:02 )   PT: 11.3 sec;   INR: 0.97          PTT - ( 05 Mar 2025 06:02 )  PTT:29.5 sec    03-05    134[L]  |  95[L]  |  37[H]  ----------------------------<  163[H]  4.8   |  22  |  6.79[H]    Ca    8.6      05 Mar 2025 06:02  Phos  2.8     03-05  Mg     2.3     03-05    TPro  6.6  /  Alb  3.3  /  TBili  0.4  /  DBili  x   /  AST  12  /  ALT  <5[L]  /  AlkPhos  251[H]  03-05      Urinalysis Basic - ( 05 Mar 2025 06:02 )    Color: x / Appearance: x / SG: x / pH: x  Gluc: 163 mg/dL / Ketone: x  / Bili: x / Urobili: x   Blood: x / Protein: x / Nitrite: x   Leuk Esterase: x / RBC: x / WBC x   Sq Epi: x / Non Sq Epi: x / Bacteria: x        MEDICATIONS  (STANDING):  aspirin enteric coated 81 milliGRAM(s) Oral daily  atorvastatin 40 milliGRAM(s) Oral at bedtime  carvedilol 3.125 milliGRAM(s) Oral every 12 hours  clopidogrel Tablet 75 milliGRAM(s) Oral daily  dextrose 5%. 1000 milliLiter(s) (50 mL/Hr) IV Continuous <Continuous>  dextrose 5%. 1000 milliLiter(s) (100 mL/Hr) IV Continuous <Continuous>  dextrose 50% Injectable 50 milliLiter(s) IV Push every 15 minutes  dextrose 50% Injectable 25 milliLiter(s) IV Push every 15 minutes  heparin   Injectable 5000 Unit(s) SubCutaneous every 8 hours  influenza  Vaccine (HIGH DOSE) 0.5 milliLiter(s) IntraMuscular once  insulin glargine Injectable (LANTUS) 8 Unit(s) SubCutaneous at bedtime  insulin lispro (ADMELOG) corrective regimen sliding scale   SubCutaneous three times a day before meals  insulin lispro (ADMELOG) corrective regimen sliding scale   SubCutaneous at bedtime  insulin lispro Injectable (ADMELOG) 2 Unit(s) SubCutaneous three times a day before meals  levETIRAcetam 500 milliGRAM(s) Oral every 12 hours  pantoprazole    Tablet 40 milliGRAM(s) Oral daily  polyethylene glycol 3350 17 Gram(s) Oral daily  senna 2 Tablet(s) Oral at bedtime  sodium chloride 0.9% lock flush 3 milliLiter(s) IV Push every 8 hours  sodium chloride 0.9%. 1000 milliLiter(s) (10 mL/Hr) IV Continuous <Continuous>    MEDICATIONS  (PRN):  acetaminophen     Tablet .. 650 milliGRAM(s) Oral every 6 hours PRN Mild Pain (1 - 3)  dextrose Oral Gel 15 Gram(s) Oral once PRN Blood Glucose LESS THAN 70 milliGRAM(s)/deciliter  oxyCODONE    IR 5 milliGRAM(s) Oral every 6 hours PRN Moderate Pain (4 - 6)        RADIOLOGY & ADDITIONAL TESTS:  < from: Xray Chest 1 View- PORTABLE-Routine (Xray Chest 1 View- PORTABLE-Routine in AM.) (03.06.25 @ 05:11) >  Similar appearance to prior exam 3/5/2025. Small left pleural effusion.   Hypoinflation. Postop changes. No acute infiltrate appearing. No   pneumothorax.    < end of copied text >

## 2025-03-07 LAB
ALBUMIN SERPL ELPH-MCNC: 3.2 G/DL — LOW (ref 3.3–5)
ALP SERPL-CCNC: 200 U/L — HIGH (ref 40–120)
ALT FLD-CCNC: <5 U/L — LOW (ref 10–45)
ANION GAP SERPL CALC-SCNC: 17 MMOL/L — SIGNIFICANT CHANGE UP (ref 5–17)
APTT BLD: 26.6 SEC — SIGNIFICANT CHANGE UP (ref 24.5–35.6)
AST SERPL-CCNC: 12 U/L — SIGNIFICANT CHANGE UP (ref 10–40)
BILIRUB SERPL-MCNC: 0.5 MG/DL — SIGNIFICANT CHANGE UP (ref 0.2–1.2)
BUN SERPL-MCNC: 41 MG/DL — HIGH (ref 7–23)
CALCIUM SERPL-MCNC: 8.8 MG/DL — SIGNIFICANT CHANGE UP (ref 8.4–10.5)
CHLORIDE SERPL-SCNC: 95 MMOL/L — LOW (ref 96–108)
CO2 SERPL-SCNC: 24 MMOL/L — SIGNIFICANT CHANGE UP (ref 22–31)
CREAT SERPL-MCNC: 7.09 MG/DL — HIGH (ref 0.5–1.3)
EGFR: 7 ML/MIN/1.73M2 — LOW
EGFR: 7 ML/MIN/1.73M2 — LOW
GLUCOSE SERPL-MCNC: 144 MG/DL — HIGH (ref 70–99)
HCT VFR BLD CALC: 34.3 % — LOW (ref 39–50)
HGB BLD-MCNC: 10.5 G/DL — LOW (ref 13–17)
INR BLD: 0.94 — SIGNIFICANT CHANGE UP (ref 0.85–1.16)
MAGNESIUM SERPL-MCNC: 2.3 MG/DL — SIGNIFICANT CHANGE UP (ref 1.6–2.6)
MCHC RBC-ENTMCNC: 30.6 G/DL — LOW (ref 32–36)
MCHC RBC-ENTMCNC: 30.6 PG — SIGNIFICANT CHANGE UP (ref 27–34)
MCV RBC AUTO: 100 FL — SIGNIFICANT CHANGE UP (ref 80–100)
NRBC BLD AUTO-RTO: 0 /100 WBCS — SIGNIFICANT CHANGE UP (ref 0–0)
PLATELET # BLD AUTO: 266 K/UL — SIGNIFICANT CHANGE UP (ref 150–400)
POTASSIUM SERPL-MCNC: 4.7 MMOL/L — SIGNIFICANT CHANGE UP (ref 3.5–5.3)
POTASSIUM SERPL-SCNC: 4.7 MMOL/L — SIGNIFICANT CHANGE UP (ref 3.5–5.3)
PROT SERPL-MCNC: 6.6 G/DL — SIGNIFICANT CHANGE UP (ref 6–8.3)
PROTHROM AB SERPL-ACNC: 11 SEC — SIGNIFICANT CHANGE UP (ref 9.9–13.4)
RBC # BLD: 3.43 M/UL — LOW (ref 4.2–5.8)
RBC # FLD: 16.2 % — HIGH (ref 10.3–14.5)
SODIUM SERPL-SCNC: 136 MMOL/L — SIGNIFICANT CHANGE UP (ref 135–145)
WBC # BLD: 8.41 K/UL — SIGNIFICANT CHANGE UP (ref 3.8–10.5)
WBC # FLD AUTO: 8.41 K/UL — SIGNIFICANT CHANGE UP (ref 3.8–10.5)

## 2025-03-07 PROCEDURE — 71045 X-RAY EXAM CHEST 1 VIEW: CPT | Mod: 26

## 2025-03-07 PROCEDURE — 90935 HEMODIALYSIS ONE EVALUATION: CPT

## 2025-03-07 RX ORDER — BISACODYL 5 MG
10 TABLET, DELAYED RELEASE (ENTERIC COATED) ORAL DAILY
Refills: 0 | Status: DISCONTINUED | OUTPATIENT
Start: 2025-03-07 | End: 2025-03-10

## 2025-03-07 RX ADMIN — Medication 2 TABLET(S): at 22:16

## 2025-03-07 RX ADMIN — ATORVASTATIN CALCIUM 40 MILLIGRAM(S): 80 TABLET, FILM COATED ORAL at 22:16

## 2025-03-07 RX ADMIN — Medication 81 MILLIGRAM(S): at 11:46

## 2025-03-07 RX ADMIN — LEVETIRACETAM 500 MILLIGRAM(S): 10 INJECTION, SOLUTION INTRAVENOUS at 20:23

## 2025-03-07 RX ADMIN — INSULIN LISPRO 2 UNIT(S): 100 INJECTION, SOLUTION INTRAVENOUS; SUBCUTANEOUS at 07:58

## 2025-03-07 RX ADMIN — INSULIN LISPRO 2: 100 INJECTION, SOLUTION INTRAVENOUS; SUBCUTANEOUS at 07:15

## 2025-03-07 RX ADMIN — HEPARIN SODIUM 5000 UNIT(S): 1000 INJECTION INTRAVENOUS; SUBCUTANEOUS at 22:15

## 2025-03-07 RX ADMIN — Medication 3 MILLILITER(S): at 05:47

## 2025-03-07 RX ADMIN — INSULIN LISPRO 4: 100 INJECTION, SOLUTION INTRAVENOUS; SUBCUTANEOUS at 17:15

## 2025-03-07 RX ADMIN — LEVETIRACETAM 500 MILLIGRAM(S): 10 INJECTION, SOLUTION INTRAVENOUS at 05:51

## 2025-03-07 RX ADMIN — Medication 650 MILLIGRAM(S): at 18:41

## 2025-03-07 RX ADMIN — INSULIN LISPRO 2 UNIT(S): 100 INJECTION, SOLUTION INTRAVENOUS; SUBCUTANEOUS at 11:51

## 2025-03-07 RX ADMIN — CLOPIDOGREL BISULFATE 75 MILLIGRAM(S): 75 TABLET, FILM COATED ORAL at 11:46

## 2025-03-07 RX ADMIN — HEPARIN SODIUM 5000 UNIT(S): 1000 INJECTION INTRAVENOUS; SUBCUTANEOUS at 05:51

## 2025-03-07 RX ADMIN — INSULIN GLARGINE-YFGN 8 UNIT(S): 100 INJECTION, SOLUTION SUBCUTANEOUS at 22:15

## 2025-03-07 RX ADMIN — HEPARIN SODIUM 5000 UNIT(S): 1000 INJECTION INTRAVENOUS; SUBCUTANEOUS at 14:11

## 2025-03-07 RX ADMIN — Medication 40 MILLIGRAM(S): at 11:46

## 2025-03-07 RX ADMIN — Medication 5 MILLIGRAM(S): at 22:15

## 2025-03-07 RX ADMIN — INSULIN LISPRO 4: 100 INJECTION, SOLUTION INTRAVENOUS; SUBCUTANEOUS at 11:51

## 2025-03-07 RX ADMIN — CARVEDILOL 3.12 MILLIGRAM(S): 3.12 TABLET, FILM COATED ORAL at 05:51

## 2025-03-07 RX ADMIN — Medication 3 MILLILITER(S): at 14:06

## 2025-03-07 RX ADMIN — Medication 3 MILLILITER(S): at 21:28

## 2025-03-07 RX ADMIN — Medication 650 MILLIGRAM(S): at 19:36

## 2025-03-07 RX ADMIN — CARVEDILOL 3.12 MILLIGRAM(S): 3.12 TABLET, FILM COATED ORAL at 17:41

## 2025-03-07 NOTE — PROGRESS NOTE ADULT - SUBJECTIVE AND OBJECTIVE BOX
NEPHROLOGY PROGRESS NOTE:    Interval history:  Patient seen and examined on HD.    Vitals:  T(F): 96.7 (25 @ 05:00), Max: 98.6 (25 @ 14:01)  HR: 76 (25 @ 08:34)  BP: 156/69 (25 @ 08:34)  RR: 18 (25 @ 03:19)  SpO2: 93% (25 @ 08:34)  Wt(kg): --     07:01  -   @ 07:00  --------------------------------------------------------  IN: 440 mL / OUT: 1700 mL / NET: -1260 mL     07:01  -   @ 07:00  --------------------------------------------------------  IN: 300 mL / OUT: 0 mL / NET: 300 mL          PE:  General: Alert, on NC 2L/min   Chest: Diminished breath at left base  Heart: RRR, S1/S2 wnl, no MRG  Abdomen: Soft, nontender, nondistended   Extremities: No edema  Neuro:  Alert, answer question appropriately  Access: RUE AVF with palpable thrill    Pertinent labs & Imagin-07    136  |  95[L]  |  41[H]  ----------------------------<  144[H]  4.7   |  24  |  7.09[H]    Ca    8.8      07 Mar 2025 05:30  Mg     2.3         TPro  6.6  /  Alb  3.2[L]  /  TBili  0.5  /  DBili      /  AST  12  /  ALT  <5[L]  /  AlkPhos  200[H]                            10.5   8.41  )-----------( 266      ( 07 Mar 2025 05:30 )             34.3       Urine Studies:  Urinalysis Basic - ( 07 Mar 2025 05:30 )    Color:  / Appearance:  / SG:  / pH:   Gluc: 144 mg/dL / Ketone:   / Bili:  / Urobili:    Blood:  / Protein:  / Nitrite:    Leuk Esterase:  / RBC:  / WBC    Sq Epi:  / Non Sq Epi:  / Bacteria:             MEDICATIONS  (STANDING):  aspirin enteric coated 81 milliGRAM(s) Oral daily  atorvastatin 40 milliGRAM(s) Oral at bedtime  bisacodyl 5 milliGRAM(s) Oral at bedtime  carvedilol 3.125 milliGRAM(s) Oral every 12 hours  clopidogrel Tablet 75 milliGRAM(s) Oral daily  dextrose 5%. 1000 milliLiter(s) (50 mL/Hr) IV Continuous <Continuous>  dextrose 5%. 1000 milliLiter(s) (100 mL/Hr) IV Continuous <Continuous>  dextrose 50% Injectable 50 milliLiter(s) IV Push every 15 minutes  dextrose 50% Injectable 25 milliLiter(s) IV Push every 15 minutes  heparin   Injectable 5000 Unit(s) SubCutaneous every 8 hours  influenza  Vaccine (HIGH DOSE) 0.5 milliLiter(s) IntraMuscular once  insulin glargine Injectable (LANTUS) 8 Unit(s) SubCutaneous at bedtime  insulin lispro (ADMELOG) corrective regimen sliding scale   SubCutaneous three times a day before meals  insulin lispro (ADMELOG) corrective regimen sliding scale   SubCutaneous at bedtime  insulin lispro Injectable (ADMELOG) 2 Unit(s) SubCutaneous three times a day before meals  levETIRAcetam 500 milliGRAM(s) Oral every 12 hours  pantoprazole    Tablet 40 milliGRAM(s) Oral daily  polyethylene glycol 3350 17 Gram(s) Oral daily  senna 2 Tablet(s) Oral at bedtime  sodium chloride 0.9% lock flush 3 milliLiter(s) IV Push every 8 hours  sodium chloride 0.9%. 1000 milliLiter(s) (10 mL/Hr) IV Continuous <Continuous>

## 2025-03-07 NOTE — PROGRESS NOTE ADULT - ASSESSMENT
83 yo male with a PMHx ESRD 9HD T/Thurs/Sat), CAD 9s/p PCI), T2DM, HTN, HLD, BPH, bladder diverticulum who presented to [facility] with chest pain found with NSTEMI and cardiac cath which revealed 3v CAD. Patient was started on a hep gtt and on 2/24/25, patient was transferred to [facility] under  for further management. On 2/26/25, patient underwent CABG x2 (LIMA-LAD, SVG-OM2), EF normal with . Intra-op, patient received 1.5L LR/ 500cc Albumin/ 2u PRBC, arrived to CTICU intubated on Levo/ Vaso. Got HD. POD1, patient was extubated overnight, remained on Levo and Matias while undergoing HD -2L, started on PO Midodrine 10mg q8hrs while still on pressors, substernal tube removed. POD2, patient lethargic, difficulty with ambulation, continued on pressors for cerebral perfusion. Received HD -2.5L. POD3, mental status improved, got HD ,received 1u PRBC, all drains removed except 1 mediastinal latrice, continued on pressors and Midodrine. POD4, weaned off pressors over night, ambulated well. POD5, started on Lopressor 25mg BID, last mediastinal latrice removed, moderate L pleural effusion on POCUS, got HD. POD6, Lopressor increased, POCUS w/ similar appearing L pleural effusion without window. POD7, Ambulated, received HD session, OT/PT recommending acute rehab, auth started. Prevena removed. Maintain SBP >140 for cerebral profusion. Lopressor stopped and carvedilol 3.25 started. POD8, wires were cut, s/p POCUS to L lung w/ small pleural effusion, no window for drainage and patient remained sating 97% on room air. Auth started for acute rehab. POD9, Patient without complaints, pulling 1500 on IS and sating 97% on room air, awaiting rehab auth. Will receive HD today.    A/P:  Neurovascular: No delirium. Pain well controlled with current regimen.  -PRN's.  -Patient denies h/o seizures vs CVA: per sign out from ICU, patient w/ abnormal mental status on POD 3-4, possible hx of CVA w/ seizure in past per [facility] documentation continued on Keppra 500mg BID, mental status improved with higher MAP goals while in pressors.    Has been A&Ox4 3/5/25-3/7/25, will continue to monitor     Cardiovascular: Hemodynamically stable. HR controlled.  -CAD, s/p CABG x2 (LIMA-LAD, SVG-OM2), EF normal (2/26/25)  C/w ASA, plavix, Coreg 3.125mg BID, Lipitor 40mg daily    C/w ERP   Pulling 1500cc on IS, continuing to work on ambulation with PT  -HLD: c/w Lipitor 40mg daily     Respiratory: 02 Sat = 98% on RA.  -Encourage C+DB and Use of IS 10x / hr while awake.  -CXR: small L pleural effusion   Getting diuresis with HD today   S/p bedside POCUS on 3/6/25, small pleural effusion, no need for drainage. Sating well on room air.     GI: Stable.  -PPX: pantoprazole 40mg daily   -PO Diet.  -Transaminitis: no abd pain, trending Alk Phos 250>200 today  -Bowel regimen: miralax, senna, dulcolax    Renal / :  -Monitor renal function: BUN 41, Cr 7.09  -Monitor I/O's.  -ESRD (on HD t/thurs/sat): last got HD on 3/3/25   Nephrology following, appreciate recs  Got HD on 3/5/25, getting HD 3/7/25  Makes very minimal urine per patient, none documented in chart and on bladder scan, nothing in bladder  Has not been getting diuresed w/ oral meds     Endocrine:    -T2DM (A1c: 7.7%): on home meds, held inpatient  Remains on Lantus 8 units nightly, Lispro 2 units w/ meals, ISS.   Monitoring glucose, adjust regimen prn   -TSH: 0.514, no hx    Hematologic:  -CBC: RBC 3.43, Hgb 10.5, Plt 266  -Coagulation Panel: PTT 26.6, PT 11, INR .94    ID:  -Tempature: afebrile   -CBC: WBC 8.41  -Observe for SIRS/Sepsis Syndrome.    Prophylaxis:  -DVT prophylaxis with 5000 SubQ Heparin q8h.  -SCD's    Disposition:  -Telemetry, planned for acute rehab, pending auth. Wires have been cut, all tubes out.

## 2025-03-07 NOTE — PROGRESS NOTE ADULT - ASSESSMENT
84-year-old M with PMH of ESRD on HD TTS transferred to [facility] for CABG (2/24).  CABG performed on 2/26.  Nephrology following for ESRD management and postoperative volume management.    1-ESRD on HD TTS s/p CABG:  Last HD on 3/5, 2L removed  HD today off outpatient schedule and tomorrow to align on his outpatient schedule  Duration: 210 minutes, target fluid removal: 2 L, Dialysate electrolytes: Potassium 2, calcium 2.5, sodium 138, bicarbonate 35  –Strict I/O monitoring  –Access: RUE AVF    2-BP/Volume management: SBP in the 150s  Started on carvedilol 3.125 mg  –Hold before HD      Thank you for consulting Nephrology.    Telly Ramirez MD  PGY-4 Nephrology Fellow

## 2025-03-07 NOTE — PROGRESS NOTE ADULT - SUBJECTIVE AND OBJECTIVE BOX
Patient discussed on morning rounds with Dr. Musa     Operation / Date:   S/p CABG x2 (LIMA-LAD, SVG-OM2), EF normal on 2/26/25    SUBJECTIVE ASSESSMENT:  84y Male noting some chest discomfort near latrice removal sites, improving, notes last BM was 3 days ago, denies abd pain/ n/ v. Denies fever, chills, chest pain, sob. A&Ox4 today. Aware of rehab plan .     Vital Signs Last 24 Hrs  T(C): 35.9 (07 Mar 2025 05:00), Max: 37 (06 Mar 2025 14:01)  T(F): 96.7 (07 Mar 2025 05:00), Max: 98.6 (06 Mar 2025 14:01)  HR: 74 (07 Mar 2025 07:39) (74 - 88)  BP: 133/60 (07 Mar 2025 07:39) (133/60 - 170/72)  BP(mean): 86 (07 Mar 2025 07:39) (86 - 103)  RR: 18 (07 Mar 2025 03:19) (18 - 19)  SpO2: 89% (07 Mar 2025 07:39) (89% - 100%)    Parameters below as of 07 Mar 2025 07:39  Patient On (Oxygen Delivery Method): nasal cannula  O2 Flow (L/min): 2    I&O's Detail    06 Mar 2025 07:01  -  07 Mar 2025 07:00  --------------------------------------------------------  IN:    Oral Fluid: 300 mL  Total IN: 300 mL    OUT:  Total OUT: 0 mL    Total NET: 300 mL    CHEST TUBE:  No.   LATRICE DRAIN:  No.  EPICARDIAL WIRES: No. CUT  TIE DOWNS: Yes  NIXON: No.    PHYSICAL EXAM:  Appearance: No acute distress.  Neurologic: AAOx3, no AMS or focal deficits.  Responds appropriately to verbal and physical stimuli; exhibits purposeful movement in all extremities.  Cardiovascular: RRR, S1 S2. No m/r/g.  Respiratory: No acute respiratory distress. CTA b/l, no w/r/r.   Gastrointestinal:  Soft, non-tender, non-distended, + BS.	  Skin: No rashes. No ecchymoses. No cyanosis.  Extremities: +mild LE swelling. Exhibits normal range of motion, no clubbing, cyanosis or edema.  Vascular: Peripheral pulses palpable 2+ bilaterally.  Incision Sites: +MSI well approximated without sternal clicks, no erythema. Latrice and CT removal sites c/d/i w/ 1 suture in place. RLE GSV harvest site well approximated.     LABS:                        10.5   8.41  )-----------( 266      ( 07 Mar 2025 05:30 )             34.3       PT/INR - ( 07 Mar 2025 05:30 )   PT: 11.0 sec;   INR: 0.94          PTT - ( 07 Mar 2025 05:30 )  PTT:26.6 sec    03-07    136  |  95[L]  |  41[H]  ----------------------------<  144[H]  4.7   |  24  |  7.09[H]    Ca    8.8      07 Mar 2025 05:30  Mg     2.3     03-07    TPro  6.6  /  Alb  3.2[L]  /  TBili  0.5  /  DBili  x   /  AST  12  /  ALT  <5[L]  /  AlkPhos  200[H]  03-07      Urinalysis Basic - ( 07 Mar 2025 05:30 )    Color: x / Appearance: x / SG: x / pH: x  Gluc: 144 mg/dL / Ketone: x  / Bili: x / Urobili: x   Blood: x / Protein: x / Nitrite: x   Leuk Esterase: x / RBC: x / WBC x   Sq Epi: x / Non Sq Epi: x / Bacteria: x        MEDICATIONS  (STANDING):  aspirin enteric coated 81 milliGRAM(s) Oral daily  atorvastatin 40 milliGRAM(s) Oral at bedtime  bisacodyl 5 milliGRAM(s) Oral at bedtime  carvedilol 3.125 milliGRAM(s) Oral every 12 hours  clopidogrel Tablet 75 milliGRAM(s) Oral daily  dextrose 5%. 1000 milliLiter(s) (50 mL/Hr) IV Continuous <Continuous>  dextrose 5%. 1000 milliLiter(s) (100 mL/Hr) IV Continuous <Continuous>  dextrose 50% Injectable 50 milliLiter(s) IV Push every 15 minutes  dextrose 50% Injectable 25 milliLiter(s) IV Push every 15 minutes  heparin   Injectable 5000 Unit(s) SubCutaneous every 8 hours  influenza  Vaccine (HIGH DOSE) 0.5 milliLiter(s) IntraMuscular once  insulin glargine Injectable (LANTUS) 8 Unit(s) SubCutaneous at bedtime  insulin lispro (ADMELOG) corrective regimen sliding scale   SubCutaneous three times a day before meals  insulin lispro (ADMELOG) corrective regimen sliding scale   SubCutaneous at bedtime  insulin lispro Injectable (ADMELOG) 2 Unit(s) SubCutaneous three times a day before meals  levETIRAcetam 500 milliGRAM(s) Oral every 12 hours  pantoprazole    Tablet 40 milliGRAM(s) Oral daily  polyethylene glycol 3350 17 Gram(s) Oral daily  senna 2 Tablet(s) Oral at bedtime  sodium chloride 0.9% lock flush 3 milliLiter(s) IV Push every 8 hours  sodium chloride 0.9%. 1000 milliLiter(s) (10 mL/Hr) IV Continuous <Continuous>    MEDICATIONS  (PRN):  acetaminophen     Tablet .. 650 milliGRAM(s) Oral every 6 hours PRN Mild Pain (1 - 3)  bisacodyl Suppository 10 milliGRAM(s) Rectal daily PRN Constipation  dextrose Oral Gel 15 Gram(s) Oral once PRN Blood Glucose LESS THAN 70 milliGRAM(s)/deciliter  oxyCODONE    IR 5 milliGRAM(s) Oral every 6 hours PRN Moderate Pain (4 - 6)        RADIOLOGY & ADDITIONAL TESTS:

## 2025-03-08 PROCEDURE — 71045 X-RAY EXAM CHEST 1 VIEW: CPT | Mod: 26

## 2025-03-08 RX ADMIN — Medication 650 MILLIGRAM(S): at 23:16

## 2025-03-08 RX ADMIN — ATORVASTATIN CALCIUM 40 MILLIGRAM(S): 80 TABLET, FILM COATED ORAL at 22:08

## 2025-03-08 RX ADMIN — INSULIN GLARGINE-YFGN 8 UNIT(S): 100 INJECTION, SOLUTION SUBCUTANEOUS at 22:08

## 2025-03-08 RX ADMIN — POLYETHYLENE GLYCOL 3350 17 GRAM(S): 17 POWDER, FOR SOLUTION ORAL at 12:16

## 2025-03-08 RX ADMIN — HEPARIN SODIUM 5000 UNIT(S): 1000 INJECTION INTRAVENOUS; SUBCUTANEOUS at 06:12

## 2025-03-08 RX ADMIN — Medication 5 MILLIGRAM(S): at 22:08

## 2025-03-08 RX ADMIN — OXYCODONE HYDROCHLORIDE 5 MILLIGRAM(S): 30 TABLET ORAL at 01:19

## 2025-03-08 RX ADMIN — INSULIN LISPRO 2 UNIT(S): 100 INJECTION, SOLUTION INTRAVENOUS; SUBCUTANEOUS at 07:17

## 2025-03-08 RX ADMIN — OXYCODONE HYDROCHLORIDE 5 MILLIGRAM(S): 30 TABLET ORAL at 02:05

## 2025-03-08 RX ADMIN — Medication 3 MILLILITER(S): at 14:45

## 2025-03-08 RX ADMIN — HEPARIN SODIUM 5000 UNIT(S): 1000 INJECTION INTRAVENOUS; SUBCUTANEOUS at 14:16

## 2025-03-08 RX ADMIN — Medication 650 MILLIGRAM(S): at 22:07

## 2025-03-08 RX ADMIN — HEPARIN SODIUM 5000 UNIT(S): 1000 INJECTION INTRAVENOUS; SUBCUTANEOUS at 22:08

## 2025-03-08 RX ADMIN — Medication 2 TABLET(S): at 22:07

## 2025-03-08 RX ADMIN — Medication 3 MILLILITER(S): at 06:06

## 2025-03-08 RX ADMIN — Medication 3 MILLILITER(S): at 21:57

## 2025-03-08 RX ADMIN — INSULIN LISPRO 2: 100 INJECTION, SOLUTION INTRAVENOUS; SUBCUTANEOUS at 17:25

## 2025-03-08 RX ADMIN — CARVEDILOL 3.12 MILLIGRAM(S): 3.12 TABLET, FILM COATED ORAL at 17:40

## 2025-03-08 RX ADMIN — CARVEDILOL 3.12 MILLIGRAM(S): 3.12 TABLET, FILM COATED ORAL at 06:12

## 2025-03-08 RX ADMIN — INSULIN LISPRO 2: 100 INJECTION, SOLUTION INTRAVENOUS; SUBCUTANEOUS at 12:14

## 2025-03-08 RX ADMIN — Medication 650 MILLIGRAM(S): at 02:05

## 2025-03-08 RX ADMIN — INSULIN LISPRO 2 UNIT(S): 100 INJECTION, SOLUTION INTRAVENOUS; SUBCUTANEOUS at 17:24

## 2025-03-08 RX ADMIN — CLOPIDOGREL BISULFATE 75 MILLIGRAM(S): 75 TABLET, FILM COATED ORAL at 12:15

## 2025-03-08 RX ADMIN — Medication 40 MILLIGRAM(S): at 12:16

## 2025-03-08 RX ADMIN — Medication 81 MILLIGRAM(S): at 12:15

## 2025-03-08 RX ADMIN — Medication 650 MILLIGRAM(S): at 01:20

## 2025-03-08 RX ADMIN — LEVETIRACETAM 500 MILLIGRAM(S): 10 INJECTION, SOLUTION INTRAVENOUS at 06:12

## 2025-03-08 RX ADMIN — INSULIN LISPRO 2 UNIT(S): 100 INJECTION, SOLUTION INTRAVENOUS; SUBCUTANEOUS at 12:13

## 2025-03-08 RX ADMIN — LEVETIRACETAM 500 MILLIGRAM(S): 10 INJECTION, SOLUTION INTRAVENOUS at 17:40

## 2025-03-08 NOTE — PROGRESS NOTE ADULT - SUBJECTIVE AND OBJECTIVE BOX
Patient discussed on morning rounds with Dr. Rainey    Operation / Date:   S/p CABG x2 (LIMA-LAD, SVG-OM2), EF normal on 2/26/25    SUBJECTIVE ASSESSMENT:  84y Male without complaints. Denies fever, chills, chest pain, sob, abd pain, n/v/d.     Vital Signs Last 24 Hrs  T(C): 36.2 (08 Mar 2025 16:24), Max: 36.2 (08 Mar 2025 14:18)  T(F): 97.2 (08 Mar 2025 16:24), Max: 97.2 (08 Mar 2025 14:18)  HR: 85 (08 Mar 2025 17:45) (82 - 89)  BP: 173/73 (08 Mar 2025 17:45) (105/51 - 178/79)  BP(mean): 105 (08 Mar 2025 17:45) (74 - 113)  RR: 14 (08 Mar 2025 17:45) (14 - 19)  SpO2: 100% (08 Mar 2025 17:45) (98% - 100%)    Parameters below as of 08 Mar 2025 17:45  Patient On (Oxygen Delivery Method): room air      I&O's Detail    07 Mar 2025 07:01  -  08 Mar 2025 07:00  --------------------------------------------------------  IN:    Oral Fluid: 410 mL  Total IN: 410 mL    OUT:    Other (mL): 2000 mL    Voided (mL): 0 mL  Total OUT: 2000 mL    Total NET: -1590 mL      08 Mar 2025 06:01  -  08 Mar 2025 19:00  --------------------------------------------------------  IN:    Oral Fluid: 240 mL  Total IN: 240 mL    OUT:  Total OUT: 0 mL    Total NET: 240 mL    CHEST TUBE:  No.   ORI DRAIN:  No.  EPICARDIAL WIRES: No.  TIE DOWNS: Yes.  NIXON: No.    PHYSICAL EXAM:  Appearance: No acute distress.  Neurologic: AAOx3, no AMS or focal deficits.  Responds appropriately to verbal and physical stimuli; exhibits purposeful movement in all extremities.  Cardiovascular: RRR, S1 S2. No m/r/g.  Respiratory: No acute respiratory distress. CTA b/l, no w/r/r.   Gastrointestinal:  Soft, non-tender, non-distended, + BS.	  Skin: No rashes. No ecchymoses. No cyanosis.  Extremities: +mild LE swelling. Exhibits normal range of motion, no clubbing, cyanosis or edema.  Vascular: Peripheral pulses palpable 2+ bilaterally.  Incision Sites: +MSI well approximated without sternal clicks, no erythema. Ori and CT removal sites c/d/i w/ 1 suture in place. RLE GSV harvest site well approximated.     LABS:                        10.0   8.47  )-----------( 268      ( 08 Mar 2025 10:00 )             32.1     PT/INR - ( 07 Mar 2025 05:30 )   PT: 11.0 sec;   INR: 0.94          PTT - ( 07 Mar 2025 05:30 )  PTT:26.6 sec    03-08    136  |  94[L]  |  31[H]  ----------------------------<  149[H]  4.5   |  25  |  5.76[H]    Ca    8.8      08 Mar 2025 10:00  Mg     2.3     03-08    TPro  7.3  /  Alb  3.5  /  TBili  0.6  /  DBili  x   /  AST  27  /  ALT  <5[L]  /  AlkPhos  250[H]  03-08      Urinalysis Basic - ( 08 Mar 2025 10:00 )    Color: x / Appearance: x / SG: x / pH: x  Gluc: 149 mg/dL / Ketone: x  / Bili: x / Urobili: x   Blood: x / Protein: x / Nitrite: x   Leuk Esterase: x / RBC: x / WBC x   Sq Epi: x / Non Sq Epi: x / Bacteria: x        MEDICATIONS  (STANDING):  aspirin enteric coated 81 milliGRAM(s) Oral daily  atorvastatin 40 milliGRAM(s) Oral at bedtime  bisacodyl 5 milliGRAM(s) Oral at bedtime  carvedilol 3.125 milliGRAM(s) Oral every 12 hours  clopidogrel Tablet 75 milliGRAM(s) Oral daily  dextrose 5%. 1000 milliLiter(s) (50 mL/Hr) IV Continuous <Continuous>  dextrose 5%. 1000 milliLiter(s) (100 mL/Hr) IV Continuous <Continuous>  dextrose 50% Injectable 50 milliLiter(s) IV Push every 15 minutes  dextrose 50% Injectable 25 milliLiter(s) IV Push every 15 minutes  heparin   Injectable 5000 Unit(s) SubCutaneous every 8 hours  influenza  Vaccine (HIGH DOSE) 0.5 milliLiter(s) IntraMuscular once  insulin glargine Injectable (LANTUS) 8 Unit(s) SubCutaneous at bedtime  insulin lispro (ADMELOG) corrective regimen sliding scale   SubCutaneous three times a day before meals  insulin lispro (ADMELOG) corrective regimen sliding scale   SubCutaneous at bedtime  insulin lispro Injectable (ADMELOG) 2 Unit(s) SubCutaneous three times a day before meals  levETIRAcetam 500 milliGRAM(s) Oral every 12 hours  pantoprazole    Tablet 40 milliGRAM(s) Oral daily  polyethylene glycol 3350 17 Gram(s) Oral daily  senna 2 Tablet(s) Oral at bedtime  sodium chloride 0.9% lock flush 3 milliLiter(s) IV Push every 8 hours  sodium chloride 0.9%. 1000 milliLiter(s) (10 mL/Hr) IV Continuous <Continuous>    MEDICATIONS  (PRN):  acetaminophen     Tablet .. 650 milliGRAM(s) Oral every 6 hours PRN Mild Pain (1 - 3)  bisacodyl Suppository 10 milliGRAM(s) Rectal daily PRN Constipation  dextrose Oral Gel 15 Gram(s) Oral once PRN Blood Glucose LESS THAN 70 milliGRAM(s)/deciliter  oxyCODONE    IR 5 milliGRAM(s) Oral every 6 hours PRN Moderate Pain (4 - 6)        RADIOLOGY & ADDITIONAL TESTS:

## 2025-03-08 NOTE — PROGRESS NOTE ADULT - ASSESSMENT
83 yo male with a PMHx ESRD 9HD T/Thurs/Sat), CAD 9s/p PCI), T2DM, HTN, HLD, BPH, bladder diverticulum who presented to [facility] with chest pain found with NSTEMI and cardiac cath which revealed 3v CAD. Patient was started on a hep gtt and on 2/24/25, patient was transferred to [facility] under  for further management. On 2/26/25, patient underwent CABG x2 (LIMA-LAD, SVG-OM2), EF normal with . Intra-op, patient received 1.5L LR/ 500cc Albumin/ 2u PRBC, arrived to CTICU intubated on Levo/ Vaso. Got HD. POD1, patient was extubated overnight, remained on Levo and Matias while undergoing HD -2L, started on PO Midodrine 10mg q8hrs while still on pressors, substernal tube removed. POD2, patient lethargic, difficulty with ambulation, continued on pressors for cerebral perfusion. Received HD -2.5L. POD3, mental status improved, got HD ,received 1u PRBC, all drains removed except 1 mediastinal latrice, continued on pressors and Midodrine. POD4, weaned off pressors over night, ambulated well. POD5, started on Lopressor 25mg BID, last mediastinal latrice removed, moderate L pleural effusion on POCUS, got HD. POD6, Lopressor increased, POCUS w/ similar appearing L pleural effusion without window. POD7, Ambulated, received HD session, OT/PT recommending acute rehab, auth started. Prevena removed. Maintain SBP >140 for cerebral profusion. Lopressor stopped and carvedilol 3.25 started. POD8, wires were cut, s/p POCUS to L lung w/ small pleural effusion, no window for drainage and patient remained sating 97% on room air. Auth started for acute rehab. POD9, Patient without complaints, pulling 1500 on IS and sating 97% on room air, received HD. POD10, awaiting auth.     A/P:  Neurovascular: No delirium. Pain well controlled with current regimen.  -PRN's.  -Patient denies h/o seizures vs CVA: per sign out from ICU, patient w/ abnormal mental status on POD 3-4, possible hx of CVA w/ seizure in past per [place] documentation continued on Keppra 500mg BID, mental status improved with higher MAP goals while in pressors.    Has been A&Ox4 3/5/25-3/8/25, will continue to monitor     Cardiovascular: Hemodynamically stable. HR controlled.  -CAD, s/p CABG x2 (LIMA-LAD, SVG-OM2), EF normal (2/26/25)  C/w ASA, plavix, Coreg 3.125mg BID, Lipitor 40mg daily    C/w ERP   Pulling 1500cc on IS, continuing to work on ambulation with PT  -HLD: c/w Lipitor 40mg daily     Respiratory: 02 Sat = 98% on RA.  -Encourage C+DB and Use of IS 10x / hr while awake.  -CXR: small L pleural effusion   Getting diuresis with HD   S/p bedside POCUS on 3/6/25, small pleural effusion, no need for drainage. Sating well on room air.     GI: Stable.  -PPX: pantoprazole 40mg daily   -PO Diet.  -Transaminitis: no abd pain, trending Alk Phos 250>200 today  -Bowel regimen: miralax, senna, dulcolax    Renal / :  -Monitor renal function: BUN 31, Cr 5.76  -Monitor I/O's.  -ESRD (on HD t/thurs/sat): last got HD on 3/3/25   Nephrology following, appreciate recs  Got HD on 3/5/25, 3/7/25 - will leave to rehab on MWF schedule   Makes very minimal urine per patient, none documented in chart and on bladder scan, nothing in bladder  Has not been getting diuresed w/ oral meds     Endocrine:    -T2DM (A1c: 7.7%): on home meds, held inpatient  Remains on Lantus 8 units nightly, Lispro 2 units w/ meals, ISS.   Monitoring glucose, adjust regimen prn   -TSH: 0.514, no hx    Hematologic:  -CBC: RBC 3.19, Hgb 10, Plt 268  -Coagulation Panel: PTT 26.6, PT 11, INR .94    ID:  -Tempature: afebrile   -CBC: WBC 8.47  -Observe for SIRS/Sepsis Syndrome.    Prophylaxis:  -DVT prophylaxis with 5000 SubQ Heparin q8h.  -SCD's    Disposition:  -Telemetry, planned for acute rehab, pending auth. Wires have been cut, all tubes out.

## 2025-03-08 NOTE — CHART NOTE - NSCHARTNOTESELECT_GEN_ALL_CORE
CT Removal
Nephrology
Nephrology
Nutrition Services
CT Removal/Event Note
CT removal/Event Note
S/p PW Cut/Event Note
US chest/Event Note

## 2025-03-08 NOTE — CHART NOTE - NSCHARTNOTEFT_GEN_A_CORE
CT Removal:    Pt seen and examined at bedside.  Case discussed with Dr. Musa and is recommending removal of CT.  Minimal output from CT.  No air leak appreciated.  CT removed without incident.  Occlusive dressing placed. Follow up CXR with no obvious PTX noted.  Pt tolerated procedure well and remained hemodynamically stable.
CT Removal:    Pt seen and examined at bedside.  Case discussed with Dr. Musa.  Minimal output from L pleural CT and 1 med.  No air leak appreciated.  L pleural CT and 1 med removed without incident per Dr. Musa request.  1 mediastinal tube remains. Occlusive DSD placed.  CXR no obvious PTX noted.  Pt tolerated procedure well.
Exam:  US Chest    Procedure Date:    History: 84y Male whose CXR today shows a possible left sided pleural effusion.  CABG 2/26  Findings:                  Evaluation of the _left_________ side of the thoracic cavity demonstrates                   small / medium  pleural , trace right pleural effusion                  Impression:  small/mod left pleural effusion, trace pleural effusion
Pacing wires were cut at the bedside. Patient remained stable throughout without complaints and stable vital signs.
Admitting Diagnosis:   Patient is a 84y old  Male who presents with a chief complaint of Coronary Artery Disease (05 Mar 2025 11:53)      PAST MEDICAL & SURGICAL HISTORY:  HTN (hypertension)  Gout  DM (diabetes mellitus)  History of BPH  HLD (hyperlipidemia)  CAD (coronary artery disease)  one stent 6 yrs  End stage renal disease  History of cholecystectomy  S/P arteriovenous (AV) fistula creation  1 yr ago          Current Nutrition Order:   Consistent Carbohydrate  Renal   Low Sodium        PO Intake: Good (%) [ x ]  Fair (50-75%) [   ] Poor (<25%) [   ]    GI Issues: no report of nausea, vomiting, diarrhea, constipation; +BM 3/4     Pain: noted with pain to medial chest     Skin Integrity:  surgical incision midline chest, R leg   +1 generalized edema noted  Bean score 20      03-04-25 @ 07:01  -  03-05-25 @ 07:00  --------------------------------------------------------  IN: 165 mL / OUT: 0 mL / NET: 165 mL    03-05-25 @ 07:01  -  03-05-25 @ 15:01  --------------------------------------------------------  IN: 340 mL / OUT: 1700 mL / NET: -1360 mL        Labs:   03-05    134[L]  |  95[L]  |  37[H]  ----------------------------<  163[H]  4.8   |  22  |  6.79[H]    Ca    8.6      05 Mar 2025 06:02  Phos  2.8     03-05  Mg     2.3     03-05    TPro  6.6  /  Alb  3.3  /  TBili  0.4  /  DBili  x   /  AST  12  /  ALT  <5[L]  /  AlkPhos  251[H]  03-05    CAPILLARY BLOOD GLUCOSE      POCT Blood Glucose.: 138 mg/dL (05 Mar 2025 11:27)  POCT Blood Glucose.: 159 mg/dL (05 Mar 2025 07:04)  POCT Blood Glucose.: 137 mg/dL (04 Mar 2025 22:22)  POCT Blood Glucose.: 226 mg/dL (04 Mar 2025 16:40)  POCT Blood Glucose.: 211 mg/dL (04 Mar 2025 15:56)      Medications:  MEDICATIONS  (STANDING):  aspirin enteric coated 81 milliGRAM(s) Oral daily  atorvastatin 40 milliGRAM(s) Oral at bedtime  carvedilol 3.125 milliGRAM(s) Oral every 12 hours  clopidogrel Tablet 75 milliGRAM(s) Oral daily  dextrose 5%. 1000 milliLiter(s) (100 mL/Hr) IV Continuous <Continuous>  dextrose 5%. 1000 milliLiter(s) (50 mL/Hr) IV Continuous <Continuous>  dextrose 50% Injectable 50 milliLiter(s) IV Push every 15 minutes  dextrose 50% Injectable 25 milliLiter(s) IV Push every 15 minutes  heparin   Injectable 5000 Unit(s) SubCutaneous every 8 hours  influenza  Vaccine (HIGH DOSE) 0.5 milliLiter(s) IntraMuscular once  insulin glargine Injectable (LANTUS) 8 Unit(s) SubCutaneous at bedtime  insulin lispro (ADMELOG) corrective regimen sliding scale   SubCutaneous three times a day before meals  insulin lispro (ADMELOG) corrective regimen sliding scale   SubCutaneous at bedtime  insulin lispro Injectable (ADMELOG) 2 Unit(s) SubCutaneous three times a day before meals  levETIRAcetam 500 milliGRAM(s) Oral every 12 hours  pantoprazole    Tablet 40 milliGRAM(s) Oral daily  polyethylene glycol 3350 17 Gram(s) Oral daily  senna 2 Tablet(s) Oral at bedtime  sodium chloride 0.9% lock flush 3 milliLiter(s) IV Push every 8 hours  sodium chloride 0.9%. 1000 milliLiter(s) (10 mL/Hr) IV Continuous <Continuous>    MEDICATIONS  (PRN):  acetaminophen     Tablet .. 650 milliGRAM(s) Oral every 6 hours PRN Mild Pain (1 - 3)  dextrose Oral Gel 15 Gram(s) Oral once PRN Blood Glucose LESS THAN 70 milliGRAM(s)/deciliter  oxyCODONE    IR 5 milliGRAM(s) Oral every 6 hours PRN Moderate Pain (4 - 6)      Anthropometrics:  Dosing height: 64in/5'4"  Dosing weight: 82.4kg/181.7 pounds  BMI 31.2   pounds, %    Weight Change:   3/1 predialysis 90.9kg/200.4 pounds, postdialysis 88.9kg/195.9 pounds  3/5 predialysis 88.3kg/194.6 pounds, postdialysis 86.7kg/191.1 pounds     Estimated energy needs:   Weight used for calculations	IBW  Estimated Energy Needs Weight (lbs)	130 lb  Estimated Energy Needs Weight (kg)	58.9 kg  Estimated Energy Needs From (kaila/kg)	30  Estimated Energy Needs To (kaila/kg)	35  Estimated Energy Needs Calculated From (kaila/kg)	1767  Estimated Energy Needs Calculated To (kaila/kg)	2061  Weight used for calculations	IBW  Estimated Protein Needs Weight (lbs)	130 lb  Estimated Protein Needs Weight (kg)	58.9 kg  Estimated Protein Needs From (g/kg)	1.2  Estimated Protein Needs To (g/kg)	1.5  Estimated Protein Needs Calculated From (g/kg)	70.68  Estimated Protein Needs Calculated To (g/kg)	88.35  Other Calculations	, %  IBW for EER as %IBW>100% in ICU setting. Needs adjusted for advanced age, post-op healing, ESRD on HD. Fluids per team in view of HD.    Subjective:  85 yo M with ESRD on HD TTS, coronary artery disease s/p PCI, Type 2 diabetes, hypertension, hyperlipidemia, benign prostate hypertrophy, bladder diverticulum, and chronic hip pain who presented to [facility] complaining of right sided chest pain. Patient was found to have NSTEMI. He underwent a cardiac cath which showed 3v CAD. He was started on heparin drip and transferred to [facility]. S/p CABG 2/26    Pt seen for follow up. Received sitting upright in bed, able to participate in nutrition interview. Pt endorses good appetite and intake. Consumed 100% of breakfast tray and about to eat lunch at time of assessment. No report of GI distress. Labs and medications reviewed. Na 134<L>, BUN/Cr 37/6.79<H>, eGFR 8<L>, K, Phos, Mg WNL, glucose 126-215 x 24 hours. Ordered for 8U LANTUS at bedtime, insulin sliding scale, protonix, miralax, senna. See nutrition recommendations below.     Previous Nutrition Diagnosis: Increased Nutrient Needs... energy, protein related to increased physiological demand for nutrients as evidenced by post-op healing    Active [ x ]  Resolved [   ]    Goal: Pt to meet at least 75% of nutritional needs consistently     Recommendations:  1. Recommend Consistent Carbohydrate Renal diet  2. Encourage and monitor PO intake, honor preferences as able   >> Consistently meet >75% of estimated needs during admission   >> Consider oral nutrition supplement or liberalizing diet should intake decline </= 50%   3. Monitor wt trends, GI function, skin integrity  4. Monitor lytes, renal indices, blood glucose, LFTs    5. Pain and bowel regimen per team     Education: RD encouraged continued PO intake as tolerated; discussed increased nutrient needs in setting of dialysis. Pt verbalized understanding.     Risk Level: High [   ] Moderate [ x ] Low [   ]
CT Removal:    Pt seen and examined at bedside.  Case discussed with Dr. Musa. Minimal output from CTs.  No air leak appreciated.  CT removed without incident per Dr. Musa request.  Occlusive DSD placed.  CXR no obvious PTX noted.  Pt tolerated procedure well.
Called by CTICU, patient s/p CABG with K 5.7, received 1.5L LR, 500cc albumin, 2u PRBC during procedure. On levo/vaso, intubated on 50% fio2. Team requesting 2hr HD via fistula for clearance. Will arrange for HD with parameters below. If patient is unable to tolerate HD will need HD cath for CVVHD.    Hemodialysis Treatment.:     Schedule: Once, Modality: Hemodialysis, Access: Arteriovenous Fistula    Dialyzer: Optiflux [id number], Time: 120 Min    Blood Flow: 400 mL/Min , Dialysate Flow: 500 mL/Min, Dialysate Temp: 36.5, Tubinmm (Adult)    Target Fluid Removal: 0.5 Liters    Dialysate Electrolytes (mEq/L): Potassium 2, Calcium 2.5, Sodium 138, Bicarbonate 35
Patient planned for HD today to resume outpatient TTS schedule, however patient refusing HD. Discussed with patient, continues to refuse HD given treatment yesterday. No urgent indication for HD at this time, will continue to monitor and plan for next treatment Monday or Tuesday.

## 2025-03-09 LAB
ALBUMIN SERPL ELPH-MCNC: 3.2 G/DL — LOW (ref 3.3–5)
ALP SERPL-CCNC: 213 U/L — HIGH (ref 40–120)
ALT FLD-CCNC: <5 U/L — LOW (ref 10–45)
ANION GAP SERPL CALC-SCNC: 18 MMOL/L — HIGH (ref 5–17)
APTT BLD: 37.9 SEC — HIGH (ref 24.5–35.6)
AST SERPL-CCNC: 14 U/L — SIGNIFICANT CHANGE UP (ref 10–40)
BILIRUB SERPL-MCNC: 0.5 MG/DL — SIGNIFICANT CHANGE UP (ref 0.2–1.2)
BUN SERPL-MCNC: 39 MG/DL — HIGH (ref 7–23)
CALCIUM SERPL-MCNC: 8.7 MG/DL — SIGNIFICANT CHANGE UP (ref 8.4–10.5)
CHLORIDE SERPL-SCNC: 96 MMOL/L — SIGNIFICANT CHANGE UP (ref 96–108)
CO2 SERPL-SCNC: 23 MMOL/L — SIGNIFICANT CHANGE UP (ref 22–31)
CREAT SERPL-MCNC: 7.06 MG/DL — HIGH (ref 0.5–1.3)
EGFR: 7 ML/MIN/1.73M2 — LOW
EGFR: 7 ML/MIN/1.73M2 — LOW
GLUCOSE SERPL-MCNC: 167 MG/DL — HIGH (ref 70–99)
HCT VFR BLD CALC: 32.8 % — LOW (ref 39–50)
HGB BLD-MCNC: 10.1 G/DL — LOW (ref 13–17)
INR BLD: 0.94 — SIGNIFICANT CHANGE UP (ref 0.85–1.16)
MAGNESIUM SERPL-MCNC: 2.2 MG/DL — SIGNIFICANT CHANGE UP (ref 1.6–2.6)
MCHC RBC-ENTMCNC: 30.8 G/DL — LOW (ref 32–36)
MCHC RBC-ENTMCNC: 31.9 PG — SIGNIFICANT CHANGE UP (ref 27–34)
MCV RBC AUTO: 103.5 FL — HIGH (ref 80–100)
NRBC BLD AUTO-RTO: 0 /100 WBCS — SIGNIFICANT CHANGE UP (ref 0–0)
PLATELET # BLD AUTO: 288 K/UL — SIGNIFICANT CHANGE UP (ref 150–400)
POTASSIUM SERPL-MCNC: 4.4 MMOL/L — SIGNIFICANT CHANGE UP (ref 3.5–5.3)
POTASSIUM SERPL-SCNC: 4.4 MMOL/L — SIGNIFICANT CHANGE UP (ref 3.5–5.3)
PROT SERPL-MCNC: 6.6 G/DL — SIGNIFICANT CHANGE UP (ref 6–8.3)
PROTHROM AB SERPL-ACNC: 11 SEC — SIGNIFICANT CHANGE UP (ref 9.9–13.4)
RAPID RVP RESULT: SIGNIFICANT CHANGE UP
RBC # BLD: 3.17 M/UL — LOW (ref 4.2–5.8)
RBC # FLD: 15.9 % — HIGH (ref 10.3–14.5)
SARS-COV-2 RNA SPEC QL NAA+PROBE: SIGNIFICANT CHANGE UP
SODIUM SERPL-SCNC: 137 MMOL/L — SIGNIFICANT CHANGE UP (ref 135–145)
WBC # BLD: 8.39 K/UL — SIGNIFICANT CHANGE UP (ref 3.8–10.5)
WBC # FLD AUTO: 8.39 K/UL — SIGNIFICANT CHANGE UP (ref 3.8–10.5)

## 2025-03-09 PROCEDURE — 99232 SBSQ HOSP IP/OBS MODERATE 35: CPT | Mod: 24

## 2025-03-09 RX ORDER — INSULIN GLARGINE-YFGN 100 [IU]/ML
9 INJECTION, SOLUTION SUBCUTANEOUS AT BEDTIME
Refills: 0 | Status: DISCONTINUED | OUTPATIENT
Start: 2025-03-09 | End: 2025-03-10

## 2025-03-09 RX ORDER — INSULIN LISPRO 100 U/ML
3 INJECTION, SOLUTION INTRAVENOUS; SUBCUTANEOUS
Refills: 0 | Status: DISCONTINUED | OUTPATIENT
Start: 2025-03-09 | End: 2025-03-10

## 2025-03-09 RX ADMIN — Medication 650 MILLIGRAM(S): at 09:03

## 2025-03-09 RX ADMIN — INSULIN GLARGINE-YFGN 9 UNIT(S): 100 INJECTION, SOLUTION SUBCUTANEOUS at 22:07

## 2025-03-09 RX ADMIN — LEVETIRACETAM 500 MILLIGRAM(S): 10 INJECTION, SOLUTION INTRAVENOUS at 05:35

## 2025-03-09 RX ADMIN — INSULIN LISPRO 2 UNIT(S): 100 INJECTION, SOLUTION INTRAVENOUS; SUBCUTANEOUS at 11:24

## 2025-03-09 RX ADMIN — Medication 2 TABLET(S): at 22:07

## 2025-03-09 RX ADMIN — Medication 3 MILLILITER(S): at 06:38

## 2025-03-09 RX ADMIN — Medication 650 MILLIGRAM(S): at 22:07

## 2025-03-09 RX ADMIN — Medication 40 MILLIGRAM(S): at 11:23

## 2025-03-09 RX ADMIN — INSULIN LISPRO 3 UNIT(S): 100 INJECTION, SOLUTION INTRAVENOUS; SUBCUTANEOUS at 17:08

## 2025-03-09 RX ADMIN — HEPARIN SODIUM 5000 UNIT(S): 1000 INJECTION INTRAVENOUS; SUBCUTANEOUS at 05:35

## 2025-03-09 RX ADMIN — INSULIN LISPRO 2: 100 INJECTION, SOLUTION INTRAVENOUS; SUBCUTANEOUS at 11:24

## 2025-03-09 RX ADMIN — CLOPIDOGREL BISULFATE 75 MILLIGRAM(S): 75 TABLET, FILM COATED ORAL at 11:24

## 2025-03-09 RX ADMIN — ATORVASTATIN CALCIUM 40 MILLIGRAM(S): 80 TABLET, FILM COATED ORAL at 22:07

## 2025-03-09 RX ADMIN — HEPARIN SODIUM 5000 UNIT(S): 1000 INJECTION INTRAVENOUS; SUBCUTANEOUS at 13:40

## 2025-03-09 RX ADMIN — CARVEDILOL 3.12 MILLIGRAM(S): 3.12 TABLET, FILM COATED ORAL at 05:35

## 2025-03-09 RX ADMIN — INSULIN LISPRO 2 UNIT(S): 100 INJECTION, SOLUTION INTRAVENOUS; SUBCUTANEOUS at 07:02

## 2025-03-09 RX ADMIN — Medication 650 MILLIGRAM(S): at 23:00

## 2025-03-09 RX ADMIN — Medication 3 MILLILITER(S): at 21:21

## 2025-03-09 RX ADMIN — CARVEDILOL 3.12 MILLIGRAM(S): 3.12 TABLET, FILM COATED ORAL at 17:03

## 2025-03-09 RX ADMIN — Medication 81 MILLIGRAM(S): at 11:24

## 2025-03-09 RX ADMIN — INSULIN LISPRO 2: 100 INJECTION, SOLUTION INTRAVENOUS; SUBCUTANEOUS at 07:03

## 2025-03-09 RX ADMIN — Medication 3 MILLILITER(S): at 13:32

## 2025-03-09 RX ADMIN — INSULIN LISPRO 4: 100 INJECTION, SOLUTION INTRAVENOUS; SUBCUTANEOUS at 17:08

## 2025-03-09 RX ADMIN — Medication 5 MILLIGRAM(S): at 22:07

## 2025-03-09 RX ADMIN — POLYETHYLENE GLYCOL 3350 17 GRAM(S): 17 POWDER, FOR SOLUTION ORAL at 11:23

## 2025-03-09 RX ADMIN — Medication 650 MILLIGRAM(S): at 09:40

## 2025-03-09 RX ADMIN — HEPARIN SODIUM 5000 UNIT(S): 1000 INJECTION INTRAVENOUS; SUBCUTANEOUS at 22:06

## 2025-03-09 NOTE — PROGRESS NOTE ADULT - ASSESSMENT
85 yo male with a PMHx ESRD 9HD T/Thurs/Sat), CAD 9s/p PCI), T2DM, HTN, HLD, BPH, bladder diverticulum who presented to [facility] with chest pain found with NSTEMI and cardiac cath which revealed 3v CAD. Patient was started on a hep gtt and on 2/24/25, patient was transferred to [facility] under  for further management. On 2/26/25, patient underwent CABG x2 (LIMA-LAD, SVG-OM2), EF normal with . Intra-op, patient received 1.5L LR/ 500cc Albumin/ 2u PRBC, arrived to CTICU intubated on Levo/ Vaso. Got HD. POD1, patient was extubated overnight, remained on Levo and Matias while undergoing HD -2L, started on PO Midodrine 10mg q8hrs while still on pressors, substernal tube removed. POD2, patient lethargic, difficulty with ambulation, continued on pressors for cerebral perfusion. Received HD -2.5L. POD3, mental status improved, got HD ,received 1u PRBC, all drains removed except 1 mediastinal latrice, continued on pressors and Midodrine. POD4, weaned off pressors over night, ambulated well. POD5, started on Lopressor 25mg BID, last mediastinal latrice removed, moderate L pleural effusion on POCUS, got HD. POD6, Lopressor increased, POCUS w/ similar appearing L pleural effusion without window. POD7, Ambulated, received HD session, OT/PT recommending acute rehab, auth started. Prevena removed. Maintain SBP >140 for cerebral profusion. Lopressor stopped and carvedilol 3.25 started. POD8, wires were cut, s/p POCUS to L lung w/ small pleural effusion, no window for drainage and patient remained sating 97% on room air. Auth started for acute rehab. POD9, Patient without complaints, pulling 1500 on IS and sating 97% on room air, received HD. [id number], awaiting auth, ambulating, endocrine consulted as admission A1c 7.7%- team will make adjustments to regimen for dispo tomorrow. Hopefully d/c to rehab tomorrow 3/10/25     A/P:  Neurovascular: No delirium. Pain well controlled with current regimen.  -PRN's.  -Patient denies h/o seizures vs CVA: per sign out from ICU, patient w/ abnormal mental status on POD 3-4, possible hx of CVA w/ seizure in past per [place] documentation continued on Keppra 500mg BID, mental status improved with higher MAP goals while in pressors.    Has been A&Ox4 3/5/25-3/8/25, will continue to monitor     Cardiovascular: Hemodynamically stable. HR controlled.  -CAD, s/p CABG x2 (LIMA-LAD, SVG-OM2), EF normal (2/26/25)  C/w ASA, plavix, Coreg 3.125mg BID, Lipitor 40mg daily    C/w ERP   Pulling 1500cc on IS, continuing to work on ambulation with PT  -HLD: c/w Lipitor 40mg daily     Respiratory: 02 Sat = 98% on RA.  -Encourage C+DB and Use of IS 10x / hr while awake.  -CXR: small L pleural effusion   Getting diuresis with HD   S/p bedside POCUS on 3/6/25, small pleural effusion, no need for drainage. Sating well on room air.     GI: Stable.  -PPX: pantoprazole 40mg daily   -PO Diet.  -Transaminitis: no abd pain, trending Alk Phos 213 today, discussed with , isael, will continue to monitor   -Bowel regimen: miralax, senna, dulcolax    Renal / :  -Monitor renal function: BUN 39, Cr 7/06  -Monitor I/O's.  -ESRD (on HD t/thurs/sat): last got HD on 3/3/25   Nephrology following, appreciate recs  Got HD on 3/5/25, 3/7/25 - will leave to rehab on MWF schedule   Makes very minimal urine per patient, none documented in chart and on bladder scan, nothing in bladder  Has not been getting diuresed w/ oral meds     Endocrine:    -T2DM (A1c: 7.7%): on home meds, held inpatient  Remains on Lantus 9 units nightly, Lispro 3 units w/ meals, ISS.   Monitoring glucose, adjust regimen prn   On home Glipizide 5mg daily, endocrine consulted given elevated A1c to 7.7% on admission  -TSH: 0.514, no hx    Hematologic:  -CBC: RBC 3.17, Hgb 10.1, Plt 288  -Coagulation Panel: PTT 37.9, PT 11, INR .94    ID:  -Tempature: afebrile   -CBC: WBC 8.39  -Observe for SIRS/Sepsis Syndrome.    Prophylaxis:  -DVT prophylaxis with 5000 SubQ Heparin q8h.  -SCD's    Disposition:  -Telemetry, planned for acute rehab.

## 2025-03-09 NOTE — PROGRESS NOTE ADULT - SUBJECTIVE AND OBJECTIVE BOX
Patient discussed on morning rounds with Dr. Rainey    Operation / Date:   S/p CABG x2 (LIMA-LAD, SVG-OM2), EF normal on 2/26/25    SUBJECTIVE ASSESSMENT:  84y Male noting some chest discomfort near latrice removal sites, improving, notes last BM was 3 days ago, denies abd pain/ n/ v. Denies fever, chills, chest pain, sob. A&Ox4 today. Aware of rehab plan .     Vital Signs Last 24 Hrs  T(C): 36.1 (09 Mar 2025 09:19), Max: 36.7 (09 Mar 2025 01:00)  T(F): 97 (09 Mar 2025 09:19), Max: 98.1 (09 Mar 2025 01:00)  HR: 80 (09 Mar 2025 11:44) (78 - 87)  BP: 103/50 (09 Mar 2025 11:44) (103/50 - 173/73)  BP(mean): 72 (09 Mar 2025 11:44) (72 - 106)  RR: 15 (09 Mar 2025 11:44) (14 - 18)  SpO2: 93% (09 Mar 2025 11:44) (93% - 100%)    Parameters below as of 09 Mar 2025 11:44  Patient On (Oxygen Delivery Method): room air      I&O's Detail    08 Mar 2025 06:01  -  09 Mar 2025 07:00  --------------------------------------------------------  IN:    Oral Fluid: 240 mL  Total IN: 240 mL    OUT:  Total OUT: 0 mL    Total NET: 240 mL      09 Mar 2025 07:01  -  09 Mar 2025 12:06  --------------------------------------------------------  IN:    Oral Fluid: 430 mL  Total IN: 430 mL    OUT:  Total OUT: 0 mL    Total NET: 430 mL    CHEST TUBE:  No  LATIRCE DRAIN:  No.  EPICARDIAL WIRES: No.  TIE DOWNS: No.  NIXON: No.    PHYSICAL EXAM:  Appearance: No acute distress.  Neurologic: AAOx3, no AMS or focal deficits.  Responds appropriately to verbal and physical stimuli; exhibits purposeful movement in all extremities.  Cardiovascular: RRR, S1 S2. No m/r/g.  Respiratory: No acute respiratory distress. CTA b/l, no w/r/r.   Gastrointestinal:  Soft, non-tender, non-distended, + BS.	  Skin: No rashes. No ecchymoses. No cyanosis.  Extremities: +mild LE swelling. Exhibits normal range of motion, no clubbing, cyanosis or edema.  Vascular: Peripheral pulses palpable 2+ bilaterally.  Incision Sites: +MSI well approximated without sternal clicks, no erythema. [place] and CT removal sites c/d/i w/ 1 suture in place. RLE GSV harvest site well approximated.    LABS:                        10.1   8.39  )-----------( 288      ( 09 Mar 2025 05:30 )             32.8     PT/INR - ( 09 Mar 2025 05:30 )   PT: 11.0 sec;   INR: 0.94          PTT - ( 09 Mar 2025 05:30 )  PTT:37.9 sec    03-09    137  |  96  |  39[H]  ----------------------------<  167[H]  4.4   |  23  |  7.06[H]    Ca    8.7      09 Mar 2025 05:30  Mg     2.2     03-09    TPro  6.6  /  Alb  3.2[L]  /  TBili  0.5  /  DBili  x   /  AST  14  /  ALT  <5[L]  /  AlkPhos  213[H]  03-09      Urinalysis Basic - ( 09 Mar 2025 05:30 )    Color: x / Appearance: x / SG: x / pH: x  Gluc: 167 mg/dL / Ketone: x  / Bili: x / Urobili: x   Blood: x / Protein: x / Nitrite: x   Leuk Esterase: x / RBC: x / WBC x   Sq Epi: x / Non Sq Epi: x / Bacteria: x        MEDICATIONS  (STANDING):  aspirin enteric coated 81 milliGRAM(s) Oral daily  atorvastatin 40 milliGRAM(s) Oral at bedtime  bisacodyl 5 milliGRAM(s) Oral at bedtime  carvedilol 3.125 milliGRAM(s) Oral every 12 hours  clopidogrel Tablet 75 milliGRAM(s) Oral daily  dextrose 5%. 1000 milliLiter(s) (50 mL/Hr) IV Continuous <Continuous>  dextrose 5%. 1000 milliLiter(s) (100 mL/Hr) IV Continuous <Continuous>  dextrose 50% Injectable 50 milliLiter(s) IV Push every 15 minutes  dextrose 50% Injectable 25 milliLiter(s) IV Push every 15 minutes  heparin   Injectable 5000 Unit(s) SubCutaneous every 8 hours  influenza  Vaccine (HIGH DOSE) 0.5 milliLiter(s) IntraMuscular once  insulin glargine Injectable (LANTUS) 9 Unit(s) SubCutaneous at bedtime  insulin lispro (ADMELOG) corrective regimen sliding scale   SubCutaneous three times a day before meals  insulin lispro (ADMELOG) corrective regimen sliding scale   SubCutaneous at bedtime  insulin lispro Injectable (ADMELOG) 3 Unit(s) SubCutaneous three times a day before meals  levETIRAcetam 500 milliGRAM(s) Oral every 12 hours  pantoprazole    Tablet 40 milliGRAM(s) Oral daily  polyethylene glycol 3350 17 Gram(s) Oral daily  senna 2 Tablet(s) Oral at bedtime  sodium chloride 0.9% lock flush 3 milliLiter(s) IV Push every 8 hours  sodium chloride 0.9%. 1000 milliLiter(s) (10 mL/Hr) IV Continuous <Continuous>    MEDICATIONS  (PRN):  acetaminophen     Tablet .. 650 milliGRAM(s) Oral every 6 hours PRN Mild Pain (1 - 3)  bisacodyl Suppository 10 milliGRAM(s) Rectal daily PRN Constipation  dextrose Oral Gel 15 Gram(s) Oral once PRN Blood Glucose LESS THAN 70 milliGRAM(s)/deciliter  oxyCODONE    IR 5 milliGRAM(s) Oral every 6 hours PRN Moderate Pain (4 - 6)        RADIOLOGY & ADDITIONAL TESTS:

## 2025-03-10 ENCOUNTER — TRANSCRIPTION ENCOUNTER (OUTPATIENT)
Age: 85
End: 2025-03-10

## 2025-03-10 VITALS
DIASTOLIC BLOOD PRESSURE: 62 MMHG | SYSTOLIC BLOOD PRESSURE: 152 MMHG | OXYGEN SATURATION: 95 % | RESPIRATION RATE: 18 BRPM | HEART RATE: 76 BPM

## 2025-03-10 LAB
ALBUMIN SERPL ELPH-MCNC: 3.1 G/DL — LOW (ref 3.3–5)
ALP SERPL-CCNC: 183 U/L — HIGH (ref 40–120)
ALT FLD-CCNC: <5 U/L — LOW (ref 10–45)
ANION GAP SERPL CALC-SCNC: 17 MMOL/L — SIGNIFICANT CHANGE UP (ref 5–17)
APTT BLD: 31.6 SEC — SIGNIFICANT CHANGE UP (ref 24.5–35.6)
AST SERPL-CCNC: 10 U/L — SIGNIFICANT CHANGE UP (ref 10–40)
BILIRUB SERPL-MCNC: 0.4 MG/DL — SIGNIFICANT CHANGE UP (ref 0.2–1.2)
BUN SERPL-MCNC: 50 MG/DL — HIGH (ref 7–23)
CALCIUM SERPL-MCNC: 8.5 MG/DL — SIGNIFICANT CHANGE UP (ref 8.4–10.5)
CHLORIDE SERPL-SCNC: 93 MMOL/L — LOW (ref 96–108)
CO2 SERPL-SCNC: 23 MMOL/L — SIGNIFICANT CHANGE UP (ref 22–31)
CREAT SERPL-MCNC: 8.56 MG/DL — HIGH (ref 0.5–1.3)
EGFR: 6 ML/MIN/1.73M2 — LOW
EGFR: 6 ML/MIN/1.73M2 — LOW
GLUCOSE SERPL-MCNC: 174 MG/DL — HIGH (ref 70–99)
HCT VFR BLD CALC: 29.3 % — LOW (ref 39–50)
HGB BLD-MCNC: 9.1 G/DL — LOW (ref 13–17)
INR BLD: 0.97 — SIGNIFICANT CHANGE UP (ref 0.85–1.16)
MAGNESIUM SERPL-MCNC: 2.3 MG/DL — SIGNIFICANT CHANGE UP (ref 1.6–2.6)
MCHC RBC-ENTMCNC: 31.1 G/DL — LOW (ref 32–36)
MCHC RBC-ENTMCNC: 31.4 PG — SIGNIFICANT CHANGE UP (ref 27–34)
MCV RBC AUTO: 101 FL — HIGH (ref 80–100)
NRBC BLD AUTO-RTO: 0 /100 WBCS — SIGNIFICANT CHANGE UP (ref 0–0)
PLATELET # BLD AUTO: 275 K/UL — SIGNIFICANT CHANGE UP (ref 150–400)
POTASSIUM SERPL-MCNC: 4.6 MMOL/L — SIGNIFICANT CHANGE UP (ref 3.5–5.3)
POTASSIUM SERPL-SCNC: 4.6 MMOL/L — SIGNIFICANT CHANGE UP (ref 3.5–5.3)
PROT SERPL-MCNC: 6.2 G/DL — SIGNIFICANT CHANGE UP (ref 6–8.3)
PROTHROM AB SERPL-ACNC: 11.2 SEC — SIGNIFICANT CHANGE UP (ref 9.9–13.4)
RBC # BLD: 2.9 M/UL — LOW (ref 4.2–5.8)
RBC # FLD: 15.9 % — HIGH (ref 10.3–14.5)
SODIUM SERPL-SCNC: 133 MMOL/L — LOW (ref 135–145)
WBC # BLD: 8.24 K/UL — SIGNIFICANT CHANGE UP (ref 3.8–10.5)
WBC # FLD AUTO: 8.24 K/UL — SIGNIFICANT CHANGE UP (ref 3.8–10.5)

## 2025-03-10 PROCEDURE — 86923 COMPATIBILITY TEST ELECTRIC: CPT

## 2025-03-10 PROCEDURE — 84484 ASSAY OF TROPONIN QUANT: CPT

## 2025-03-10 PROCEDURE — P9016: CPT

## 2025-03-10 PROCEDURE — C1769: CPT

## 2025-03-10 PROCEDURE — 84100 ASSAY OF PHOSPHORUS: CPT

## 2025-03-10 PROCEDURE — 80048 BASIC METABOLIC PNL TOTAL CA: CPT

## 2025-03-10 PROCEDURE — 71045 X-RAY EXAM CHEST 1 VIEW: CPT | Mod: 26

## 2025-03-10 PROCEDURE — 86901 BLOOD TYPING SEROLOGIC RH(D): CPT

## 2025-03-10 PROCEDURE — 97110 THERAPEUTIC EXERCISES: CPT

## 2025-03-10 PROCEDURE — 83880 ASSAY OF NATRIURETIC PEPTIDE: CPT

## 2025-03-10 PROCEDURE — 93880 EXTRACRANIAL BILAT STUDY: CPT

## 2025-03-10 PROCEDURE — 82962 GLUCOSE BLOOD TEST: CPT

## 2025-03-10 PROCEDURE — 90935 HEMODIALYSIS ONE EVALUATION: CPT

## 2025-03-10 PROCEDURE — C1751: CPT

## 2025-03-10 PROCEDURE — 82140 ASSAY OF AMMONIA: CPT

## 2025-03-10 PROCEDURE — 97116 GAIT TRAINING THERAPY: CPT

## 2025-03-10 PROCEDURE — 86709 HEPATITIS A IGM ANTIBODY: CPT

## 2025-03-10 PROCEDURE — P9047: CPT

## 2025-03-10 PROCEDURE — 83036 HEMOGLOBIN GLYCOSYLATED A1C: CPT

## 2025-03-10 PROCEDURE — 86891 AUTOLOGOUS BLOOD OP SALVAGE: CPT

## 2025-03-10 PROCEDURE — 71250 CT THORAX DX C-: CPT | Mod: MC

## 2025-03-10 PROCEDURE — 81001 URINALYSIS AUTO W/SCOPE: CPT

## 2025-03-10 PROCEDURE — P9045: CPT

## 2025-03-10 PROCEDURE — 82553 CREATINE MB FRACTION: CPT

## 2025-03-10 PROCEDURE — 82550 ASSAY OF CK (CPK): CPT

## 2025-03-10 PROCEDURE — 85576 BLOOD PLATELET AGGREGATION: CPT

## 2025-03-10 PROCEDURE — 86900 BLOOD TYPING SEROLOGIC ABO: CPT

## 2025-03-10 PROCEDURE — 86803 HEPATITIS C AB TEST: CPT

## 2025-03-10 PROCEDURE — 85610 PROTHROMBIN TIME: CPT

## 2025-03-10 PROCEDURE — 82330 ASSAY OF CALCIUM: CPT

## 2025-03-10 PROCEDURE — C8923: CPT

## 2025-03-10 PROCEDURE — 71045 X-RAY EXAM CHEST 1 VIEW: CPT

## 2025-03-10 PROCEDURE — 93005 ELECTROCARDIOGRAM TRACING: CPT

## 2025-03-10 PROCEDURE — 97530 THERAPEUTIC ACTIVITIES: CPT

## 2025-03-10 PROCEDURE — 84443 ASSAY THYROID STIM HORMONE: CPT

## 2025-03-10 PROCEDURE — 83735 ASSAY OF MAGNESIUM: CPT

## 2025-03-10 PROCEDURE — 36415 COLL VENOUS BLD VENIPUNCTURE: CPT

## 2025-03-10 PROCEDURE — 0225U NFCT DS DNA&RNA 21 SARSCOV2: CPT

## 2025-03-10 PROCEDURE — 80061 LIPID PANEL: CPT

## 2025-03-10 PROCEDURE — 36430 TRANSFUSION BLD/BLD COMPNT: CPT

## 2025-03-10 PROCEDURE — 97161 PT EVAL LOW COMPLEX 20 MIN: CPT

## 2025-03-10 PROCEDURE — 84132 ASSAY OF SERUM POTASSIUM: CPT

## 2025-03-10 PROCEDURE — 99232 SBSQ HOSP IP/OBS MODERATE 35: CPT

## 2025-03-10 PROCEDURE — 86850 RBC ANTIBODY SCREEN: CPT

## 2025-03-10 PROCEDURE — 97165 OT EVAL LOW COMPLEX 30 MIN: CPT

## 2025-03-10 PROCEDURE — 85025 COMPLETE CBC W/AUTO DIFF WBC: CPT

## 2025-03-10 PROCEDURE — 83605 ASSAY OF LACTIC ACID: CPT

## 2025-03-10 PROCEDURE — 85730 THROMBOPLASTIN TIME PARTIAL: CPT

## 2025-03-10 PROCEDURE — 94640 AIRWAY INHALATION TREATMENT: CPT

## 2025-03-10 PROCEDURE — 94002 VENT MGMT INPAT INIT DAY: CPT

## 2025-03-10 PROCEDURE — 87340 HEPATITIS B SURFACE AG IA: CPT

## 2025-03-10 PROCEDURE — 94150 VITAL CAPACITY TEST: CPT

## 2025-03-10 PROCEDURE — 82947 ASSAY GLUCOSE BLOOD QUANT: CPT

## 2025-03-10 PROCEDURE — 82803 BLOOD GASES ANY COMBINATION: CPT

## 2025-03-10 PROCEDURE — 84295 ASSAY OF SERUM SODIUM: CPT

## 2025-03-10 PROCEDURE — 86705 HEP B CORE ANTIBODY IGM: CPT

## 2025-03-10 PROCEDURE — 85014 HEMATOCRIT: CPT

## 2025-03-10 PROCEDURE — 80053 COMPREHEN METABOLIC PANEL: CPT

## 2025-03-10 PROCEDURE — 97535 SELF CARE MNGMENT TRAINING: CPT

## 2025-03-10 PROCEDURE — 86704 HEP B CORE ANTIBODY TOTAL: CPT

## 2025-03-10 PROCEDURE — 85027 COMPLETE CBC AUTOMATED: CPT

## 2025-03-10 PROCEDURE — 70450 CT HEAD/BRAIN W/O DYE: CPT | Mod: MC

## 2025-03-10 PROCEDURE — C1889: CPT

## 2025-03-10 PROCEDURE — 86706 HEP B SURFACE ANTIBODY: CPT

## 2025-03-10 RX ORDER — CARVEDILOL 3.12 MG/1
1 TABLET, FILM COATED ORAL
Qty: 0 | Refills: 0 | DISCHARGE
Start: 2025-03-10

## 2025-03-10 RX ORDER — LEVETIRACETAM 10 MG/ML
1 INJECTION, SOLUTION INTRAVENOUS
Qty: 0 | Refills: 0 | DISCHARGE
Start: 2025-03-10

## 2025-03-10 RX ORDER — INSULIN GLARGINE-YFGN 100 [IU]/ML
9 INJECTION, SOLUTION SUBCUTANEOUS
Qty: 0 | Refills: 0 | DISCHARGE
Start: 2025-03-10

## 2025-03-10 RX ORDER — ACETAMINOPHEN 500 MG/5ML
2 LIQUID (ML) ORAL
Qty: 0 | Refills: 0 | DISCHARGE
Start: 2025-03-10

## 2025-03-10 RX ORDER — CLOPIDOGREL BISULFATE 75 MG/1
1 TABLET, FILM COATED ORAL
Qty: 0 | Refills: 0 | DISCHARGE
Start: 2025-03-10

## 2025-03-10 RX ORDER — CARVEDILOL 3.12 MG/1
1 TABLET, FILM COATED ORAL
Refills: 0 | DISCHARGE

## 2025-03-10 RX ORDER — METOPROLOL SUCCINATE 50 MG/1
1 TABLET, EXTENDED RELEASE ORAL
Refills: 0 | DISCHARGE

## 2025-03-10 RX ORDER — LINAGLIPTIN 5 MG/1
1 TABLET, FILM COATED ORAL
Qty: 0 | Refills: 0 | DISCHARGE

## 2025-03-10 RX ORDER — HEPARIN SODIUM 1000 [USP'U]/ML
5000 INJECTION INTRAVENOUS; SUBCUTANEOUS
Qty: 0 | Refills: 0 | DISCHARGE
Start: 2025-03-10

## 2025-03-10 RX ORDER — ASPIRIN 325 MG
1 TABLET ORAL
Qty: 0 | Refills: 0 | DISCHARGE
Start: 2025-03-10

## 2025-03-10 RX ORDER — LEVETIRACETAM 10 MG/ML
1 INJECTION, SOLUTION INTRAVENOUS
Refills: 0 | DISCHARGE

## 2025-03-10 RX ORDER — OXYCODONE HYDROCHLORIDE 30 MG/1
1 TABLET ORAL
Qty: 0 | Refills: 0 | DISCHARGE
Start: 2025-03-10

## 2025-03-10 RX ADMIN — Medication 3 MILLILITER(S): at 14:10

## 2025-03-10 RX ADMIN — POLYETHYLENE GLYCOL 3350 17 GRAM(S): 17 POWDER, FOR SOLUTION ORAL at 11:05

## 2025-03-10 RX ADMIN — Medication 81 MILLIGRAM(S): at 11:05

## 2025-03-10 RX ADMIN — Medication 3 MILLILITER(S): at 05:22

## 2025-03-10 RX ADMIN — INSULIN LISPRO 3 UNIT(S): 100 INJECTION, SOLUTION INTRAVENOUS; SUBCUTANEOUS at 11:46

## 2025-03-10 RX ADMIN — HEPARIN SODIUM 5000 UNIT(S): 1000 INJECTION INTRAVENOUS; SUBCUTANEOUS at 05:27

## 2025-03-10 RX ADMIN — CARVEDILOL 3.12 MILLIGRAM(S): 3.12 TABLET, FILM COATED ORAL at 05:27

## 2025-03-10 RX ADMIN — Medication 650 MILLIGRAM(S): at 08:10

## 2025-03-10 RX ADMIN — INSULIN LISPRO 3 UNIT(S): 100 INJECTION, SOLUTION INTRAVENOUS; SUBCUTANEOUS at 07:31

## 2025-03-10 RX ADMIN — INSULIN LISPRO 2: 100 INJECTION, SOLUTION INTRAVENOUS; SUBCUTANEOUS at 07:32

## 2025-03-10 RX ADMIN — Medication 40 MILLIGRAM(S): at 11:05

## 2025-03-10 RX ADMIN — Medication 650 MILLIGRAM(S): at 07:21

## 2025-03-10 RX ADMIN — INSULIN LISPRO 2: 100 INJECTION, SOLUTION INTRAVENOUS; SUBCUTANEOUS at 11:46

## 2025-03-10 RX ADMIN — LEVETIRACETAM 500 MILLIGRAM(S): 10 INJECTION, SOLUTION INTRAVENOUS at 05:27

## 2025-03-10 RX ADMIN — CLOPIDOGREL BISULFATE 75 MILLIGRAM(S): 75 TABLET, FILM COATED ORAL at 11:05

## 2025-03-10 RX ADMIN — HEPARIN SODIUM 5000 UNIT(S): 1000 INJECTION INTRAVENOUS; SUBCUTANEOUS at 13:57

## 2025-03-10 NOTE — PROGRESS NOTE ADULT - SUBJECTIVE AND OBJECTIVE BOX
Patient discussed on morning rounds with Dr. Musa    CABG x2 (LIMA-LAD, SVG-OM2), EF NML    SUBJECTIVE ASSESSMENT:  84y male evaluated at bedside with no acute complaints. Patient had a bowel movement yesterday. He does not make much urine and is due for dialysis today. He has been ambulating and eating well. Patient denies chest pain, palpitations, nausea, vomiting, and SOB. Patient is aware that he is waiting for a bed at rehab.     Vital Signs Last 24 Hrs  T(C): 36.1 (10 Mar 2025 09:21), Max: 36.4 (09 Mar 2025 22:00)  T(F): 96.9 (10 Mar 2025 09:21), Max: 97.5 (09 Mar 2025 22:00)  HR: 76 (10 Mar 2025 08:33) (74 - 82)  BP: 135/63 (10 Mar 2025 08:33) (103/50 - 170/72)  BP(mean): 90 (10 Mar 2025 08:33) (72 - 104)  RR: 17 (10 Mar 2025 08:33) (15 - 18)  SpO2: 98% (10 Mar 2025 08:33) (91% - 98%)    Parameters below as of 10 Mar 2025 08:33  Patient On (Oxygen Delivery Method): room air    I&O's Detail    09 Mar 2025 07:01  -  10 Mar 2025 07:00  --------------------------------------------------------  IN:    Oral Fluid: 430 mL  Total IN: 430 mL    OUT:  Total OUT: 0 mL    Total NET: 430 mL    CHEST TUBE:  No  ORI DRAIN:  No  EPICARDIAL WIRES: No  TIE DOWNS: Yes  NIXON: No    PHYSICAL EXAM:  General: Sitting in chair. No acute distress.  Neurologic: AAOx3, no AMS or focal deficits. Responds appropriately to verbal and physical stimuli; exhibits purposeful movement in all extremities.  Cardiovascular: RRR, S1 S2. No m/r/g.  Respiratory: No acute respiratory distress. CTA b/l  Gastrointestinal:  Soft, non-tender, non-distended, + BS.	  Skin: No rashes. No ecchymoses. No cyanosis.  Extremities: Exhibits normal range of motion, no clubbing, cyanosis or edema.  Vascular: Peripheral pulses palpable 2+ bilaterally.  Incision Sites: +MSI well approximated without sternal clicks, no erythema. Ori and CT removal sites c/d/i with 1 suture in place. RLE GSV harvest site well approximated.    LABS:                        9.1    8.24  )-----------( 275      ( 10 Mar 2025 06:21 )             29.3     PT/INR - ( 10 Mar 2025 06:21 )   PT: 11.2 sec;   INR: 0.97       PTT - ( 10 Mar 2025 06:21 )  PTT:31.6 sec    03-10    133[L]  |  93[L]  |  50[H]  ----------------------------<  174[H]  4.6   |  23  |  8.56[H]    Ca    8.5      10 Mar 2025 06:21  Mg     2.3     03-10    TPro  6.2  /  Alb  3.1[L]  /  TBili  0.4  /  DBili  x   /  AST  10  /  ALT  <5[L]  /  AlkPhos  183[H]  03-10    Urinalysis Basic - ( 10 Mar 2025 06:21 )    Color: x / Appearance: x / SG: x / pH: x  Gluc: 174 mg/dL / Ketone: x  / Bili: x / Urobili: x   Blood: x / Protein: x / Nitrite: x   Leuk Esterase: x / RBC: x / WBC x   Sq Epi: x / Non Sq Epi: x / Bacteria: x    MEDICATIONS  (STANDING):  aspirin enteric coated 81 milliGRAM(s) Oral daily  atorvastatin 40 milliGRAM(s) Oral at bedtime  bisacodyl 5 milliGRAM(s) Oral at bedtime  carvedilol 3.125 milliGRAM(s) Oral every 12 hours  clopidogrel Tablet 75 milliGRAM(s) Oral daily  dextrose 5%. 1000 milliLiter(s) (50 mL/Hr) IV Continuous <Continuous>  dextrose 5%. 1000 milliLiter(s) (100 mL/Hr) IV Continuous <Continuous>  dextrose 50% Injectable 50 milliLiter(s) IV Push every 15 minutes  dextrose 50% Injectable 25 milliLiter(s) IV Push every 15 minutes  heparin   Injectable 5000 Unit(s) SubCutaneous every 8 hours  influenza  Vaccine (HIGH DOSE) 0.5 milliLiter(s) IntraMuscular once  insulin glargine Injectable (LANTUS) 9 Unit(s) SubCutaneous at bedtime  insulin lispro (ADMELOG) corrective regimen sliding scale   SubCutaneous three times a day before meals  insulin lispro (ADMELOG) corrective regimen sliding scale   SubCutaneous at bedtime  insulin lispro Injectable (ADMELOG) 3 Unit(s) SubCutaneous three times a day before meals  levETIRAcetam 500 milliGRAM(s) Oral every 12 hours  pantoprazole    Tablet 40 milliGRAM(s) Oral daily  polyethylene glycol 3350 17 Gram(s) Oral daily  senna 2 Tablet(s) Oral at bedtime  sodium chloride 0.9% lock flush 3 milliLiter(s) IV Push every 8 hours  sodium chloride 0.9%. 1000 milliLiter(s) (10 mL/Hr) IV Continuous <Continuous>    MEDICATIONS  (PRN):  acetaminophen     Tablet .. 650 milliGRAM(s) Oral every 6 hours PRN Mild Pain (1 - 3)  bisacodyl Suppository 10 milliGRAM(s) Rectal daily PRN Constipation  dextrose Oral Gel 15 Gram(s) Oral once PRN Blood Glucose LESS THAN 70 milliGRAM(s)/deciliter  oxyCODONE    IR 5 milliGRAM(s) Oral every 6 hours PRN Moderate Pain (4 - 6)     Patient discussed on morning rounds with Dr. Musa    2/26/25 CABG x2 (LIMA-LAD, SVG-OM2), EF NML    SUBJECTIVE ASSESSMENT:  84y male evaluated at bedside with no acute complaints. Patient had a bowel movement yesterday. He does not make much urine and is due for dialysis today. He has been ambulating and eating well. Patient denies chest pain, palpitations, nausea, vomiting, and SOB. Patient is aware that he is waiting for a bed at rehab.     Vital Signs Last 24 Hrs  T(C): 36.1 (10 Mar 2025 09:21), Max: 36.4 (09 Mar 2025 22:00)  T(F): 96.9 (10 Mar 2025 09:21), Max: 97.5 (09 Mar 2025 22:00)  HR: 76 (10 Mar 2025 08:33) (74 - 82)  BP: 135/63 (10 Mar 2025 08:33) (103/50 - 170/72)  BP(mean): 90 (10 Mar 2025 08:33) (72 - 104)  RR: 17 (10 Mar 2025 08:33) (15 - 18)  SpO2: 98% (10 Mar 2025 08:33) (91% - 98%)    Parameters below as of 10 Mar 2025 08:33  Patient On (Oxygen Delivery Method): room air    I&O's Detail    09 Mar 2025 07:01  -  10 Mar 2025 07:00  --------------------------------------------------------  IN:    Oral Fluid: 430 mL  Total IN: 430 mL    OUT:  Total OUT: 0 mL    Total NET: 430 mL    CHEST TUBE:  No  ORI DRAIN:  No  EPICARDIAL WIRES: No  TIE DOWNS: NO (MSI w *Retention sutures* at inferior pole)  NIXON: No    PHYSICAL EXAM:  General: Sitting in chair. No acute distress.  Neurologic: AAOx3, no AMS or focal deficits. Responds appropriately to verbal and physical stimuli; exhibits purposeful movement in all extremities.  Cardiovascular: RRR, S1 S2. No m/r/g.  Respiratory: No acute respiratory distress. CTA b/l  Gastrointestinal:  Soft, non-tender, non-distended, + BS.	  Skin: No rashes. No ecchymoses. No cyanosis.  Extremities: Exhibits normal range of motion, no clubbing, cyanosis or edema.  Vascular: Peripheral pulses palpable 2+ bilaterally.  Incision Sites: +MSI well approximated without sternal clicks, no erythema. Ori and CT removal sites c/d/i with 1 suture in place. RLE GSV harvest site well approximated.    LABS:                        9.1    8.24  )-----------( 275      ( 10 Mar 2025 06:21 )             29.3     PT/INR - ( 10 Mar 2025 06:21 )   PT: 11.2 sec;   INR: 0.97       PTT - ( 10 Mar 2025 06:21 )  PTT:31.6 sec    03-10    133[L]  |  93[L]  |  50[H]  ----------------------------<  174[H]  4.6   |  23  |  8.56[H]    Ca    8.5      10 Mar 2025 06:21  Mg     2.3     03-10    TPro  6.2  /  Alb  3.1[L]  /  TBili  0.4  /  DBili  x   /  AST  10  /  ALT  <5[L]  /  AlkPhos  183[H]  03-10    Urinalysis Basic - ( 10 Mar 2025 06:21 )    Color: x / Appearance: x / SG: x / pH: x  Gluc: 174 mg/dL / Ketone: x  / Bili: x / Urobili: x   Blood: x / Protein: x / Nitrite: x   Leuk Esterase: x / RBC: x / WBC x   Sq Epi: x / Non Sq Epi: x / Bacteria: x    MEDICATIONS  (STANDING):  aspirin enteric coated 81 milliGRAM(s) Oral daily  atorvastatin 40 milliGRAM(s) Oral at bedtime  bisacodyl 5 milliGRAM(s) Oral at bedtime  carvedilol 3.125 milliGRAM(s) Oral every 12 hours  clopidogrel Tablet 75 milliGRAM(s) Oral daily  dextrose 5%. 1000 milliLiter(s) (50 mL/Hr) IV Continuous <Continuous>  dextrose 5%. 1000 milliLiter(s) (100 mL/Hr) IV Continuous <Continuous>  dextrose 50% Injectable 50 milliLiter(s) IV Push every 15 minutes  dextrose 50% Injectable 25 milliLiter(s) IV Push every 15 minutes  heparin   Injectable 5000 Unit(s) SubCutaneous every 8 hours  influenza  Vaccine (HIGH DOSE) 0.5 milliLiter(s) IntraMuscular once  insulin glargine Injectable (LANTUS) 9 Unit(s) SubCutaneous at bedtime  insulin lispro (ADMELOG) corrective regimen sliding scale   SubCutaneous three times a day before meals  insulin lispro (ADMELOG) corrective regimen sliding scale   SubCutaneous at bedtime  insulin lispro Injectable (ADMELOG) 3 Unit(s) SubCutaneous three times a day before meals  levETIRAcetam 500 milliGRAM(s) Oral every 12 hours  pantoprazole    Tablet 40 milliGRAM(s) Oral daily  polyethylene glycol 3350 17 Gram(s) Oral daily  senna 2 Tablet(s) Oral at bedtime  sodium chloride 0.9% lock flush 3 milliLiter(s) IV Push every 8 hours  sodium chloride 0.9%. 1000 milliLiter(s) (10 mL/Hr) IV Continuous <Continuous>    MEDICATIONS  (PRN):  acetaminophen     Tablet .. 650 milliGRAM(s) Oral every 6 hours PRN Mild Pain (1 - 3)  bisacodyl Suppository 10 milliGRAM(s) Rectal daily PRN Constipation  dextrose Oral Gel 15 Gram(s) Oral once PRN Blood Glucose LESS THAN 70 milliGRAM(s)/deciliter  oxyCODONE    IR 5 milliGRAM(s) Oral every 6 hours PRN Moderate Pain (4 - 6)

## 2025-03-10 NOTE — PROGRESS NOTE ADULT - SUBJECTIVE AND OBJECTIVE BOX
NEPHROLOGY PROGRESS NOTE:    Interval history:  Patient seen and examined on HD. Patient denies any active complaints.       Vitals:  T(F): 97.7 (03-10-25 @ 13:03), Max: 97.7 (03-10-25 @ 13:03)  HR: 76 (03-10-25 @ 08:33)  BP: 135/63 (03-10-25 @ 08:33)  RR: 17 (03-10-25 @ 08:33)  SpO2: 98% (03-10-25 @ 08:33)  Wt(kg): --     @ :  -   @ 07:00  --------------------------------------------------------  IN: 240 mL / OUT: 0 mL / NET: 240 mL     @ :  -  03-10 @ 07:00  --------------------------------------------------------  IN: 430 mL / OUT: 0 mL / NET: 430 mL          PE:  General: Alert, on NC 2L/min   Chest: Diminished breath sounds  Heart: RRR, S1/S2 wnl, no MRG, incision scar  Abdomen: Soft, nontender, nondistended   Extremities: No edema  Neuro:  Alert, no apparent focal deficits  Access: RUE AVF with palpable thrill    Pertinent labs & Imagin-10    133[L]  |  93[L]  |  50[H]  ----------------------------<  174[H]  4.6   |  23  |  8.56[H]    Ca    8.5      10 Mar 2025 06:21  Mg     2.3     03-10    TPro  6.2  /  Alb  3.1[L]  /  TBili  0.4  /  DBili      /  AST  10  /  ALT  <5[L]  /  AlkPhos  183[H]  03-10                          9.1    8.24  )-----------( 275      ( 10 Mar 2025 06:21 )             29.3       Urine Studies:  Urinalysis Basic - ( 10 Mar 2025 06:21 )    Color:  / Appearance:  / SG:  / pH:   Gluc: 174 mg/dL / Ketone:   / Bili:  / Urobili:    Blood:  / Protein:  / Nitrite:    Leuk Esterase:  / RBC:  / WBC    Sq Epi:  / Non Sq Epi:  / Bacteria:             MEDICATIONS  (STANDING):  aspirin enteric coated 81 milliGRAM(s) Oral daily  atorvastatin 40 milliGRAM(s) Oral at bedtime  bisacodyl 5 milliGRAM(s) Oral at bedtime  carvedilol 3.125 milliGRAM(s) Oral every 12 hours  clopidogrel Tablet 75 milliGRAM(s) Oral daily  dextrose 5%. 1000 milliLiter(s) (50 mL/Hr) IV Continuous <Continuous>  dextrose 5%. 1000 milliLiter(s) (100 mL/Hr) IV Continuous <Continuous>  dextrose 50% Injectable 50 milliLiter(s) IV Push every 15 minutes  dextrose 50% Injectable 25 milliLiter(s) IV Push every 15 minutes  heparin   Injectable 5000 Unit(s) SubCutaneous every 8 hours  influenza  Vaccine (HIGH DOSE) 0.5 milliLiter(s) IntraMuscular once  insulin glargine Injectable (LANTUS) 9 Unit(s) SubCutaneous at bedtime  insulin lispro (ADMELOG) corrective regimen sliding scale   SubCutaneous three times a day before meals  insulin lispro (ADMELOG) corrective regimen sliding scale   SubCutaneous at bedtime  insulin lispro Injectable (ADMELOG) 3 Unit(s) SubCutaneous three times a day before meals  levETIRAcetam 500 milliGRAM(s) Oral every 12 hours  pantoprazole    Tablet 40 milliGRAM(s) Oral daily  polyethylene glycol 3350 17 Gram(s) Oral daily  senna 2 Tablet(s) Oral at bedtime  sodium chloride 0.9% lock flush 3 milliLiter(s) IV Push every 8 hours  sodium chloride 0.9%. 1000 milliLiter(s) (10 mL/Hr) IV Continuous <Continuous>

## 2025-03-10 NOTE — DISCHARGE NOTE NURSING/CASE MANAGEMENT/SOCIAL WORK - FINANCIAL ASSISTANCE
[facility] provides services at a reduced cost to those who are determined to be eligible through [facility]’s financial assistance program. Information regarding [facility]’s financial assistance program can be found by going to [email or web address] or by calling [phone].

## 2025-03-10 NOTE — CONSULT NOTE ADULT - SUBJECTIVE AND OBJECTIVE BOX
HISTORY OF PRESENT ILLNESS  RADHA GARCIA is a 84y Male with a past medical history of Type 2 DM, ESRD on HD TTS, coronary artery disease s/p PCI, hypertension, hyperlipidemia, benign prostate hypertrophy, bladder diverticulum, and chronic hip pain who presented to [facility] complaining of right sided chest pain.  Patient was found to have NSTEMI.  He underwent a cardiac cath which showed 3v CAD. Now s/p CABG on 2/26. Endocrinology has been consulted for Diabetes Management.    DIABETES HISTORY  - Age at diagnosis:   - Diabetes managed outpatient by:  - Current Therapy:  - History of other regimens:   - Home glucose readings:  - History of DKA/HHS:   - Diabetic Complications:   - Diet:        FAMILY HISTORY  - Diabetes:    SOCIAL HISTORY  - Work:  - Alcohol:  - Smoking:      CURRENT MEDICATIONS  acetaminophen     Tablet .. 650 milliGRAM(s) Oral every 6 hours PRN  aspirin enteric coated 81 milliGRAM(s) Oral daily  atorvastatin 40 milliGRAM(s) Oral at bedtime  bisacodyl 5 milliGRAM(s) Oral at bedtime  bisacodyl Suppository 10 milliGRAM(s) Rectal daily PRN  carvedilol 3.125 milliGRAM(s) Oral every 12 hours  clopidogrel Tablet 75 milliGRAM(s) Oral daily  dextrose 5%. 1000 milliLiter(s) IV Continuous <Continuous>  dextrose 5%. 1000 milliLiter(s) IV Continuous <Continuous>  dextrose 50% Injectable 50 milliLiter(s) IV Push every 15 minutes  dextrose 50% Injectable 25 milliLiter(s) IV Push every 15 minutes  dextrose Oral Gel 15 Gram(s) Oral once PRN  heparin   Injectable 5000 Unit(s) SubCutaneous every 8 hours  influenza  Vaccine (HIGH DOSE) 0.5 milliLiter(s) IntraMuscular once  insulin glargine Injectable (LANTUS) 9 Unit(s) SubCutaneous at bedtime  insulin lispro (ADMELOG) corrective regimen sliding scale   SubCutaneous three times a day before meals  insulin lispro (ADMELOG) corrective regimen sliding scale   SubCutaneous at bedtime  insulin lispro Injectable (ADMELOG) 3 Unit(s) SubCutaneous three times a day before meals  levETIRAcetam 500 milliGRAM(s) Oral every 12 hours  oxyCODONE    IR 5 milliGRAM(s) Oral every 6 hours PRN  pantoprazole    Tablet 40 milliGRAM(s) Oral daily  polyethylene glycol 3350 17 Gram(s) Oral daily  senna 2 Tablet(s) Oral at bedtime  sodium chloride 0.9% lock flush 3 milliLiter(s) IV Push every 8 hours  sodium chloride 0.9%. 1000 milliLiter(s) IV Continuous <Continuous>      REVIEW OF SYSTEMS  Constitutional:  Negative fever, chills   Cardiovascular:  Negative for chest pain or palpitations.  Respiratory:  Negative for cough, wheezing, or shortness of breath.   Gastrointestinal:  Negative for nausea, vomiting, diarrhea, constipation, or abdominal pain.  Genitourinary:  Negative frequency, urgency or dysuria.  Neurologic:  No headache, confusion, dizziness    PHYSICAL EXAM  Vital Signs Last 24 Hrs  T(C): 36.1 (10 Mar 2025 09:21), Max: 36.4 (09 Mar 2025 22:00)  T(F): 96.9 (10 Mar 2025 09:21), Max: 97.5 (09 Mar 2025 22:00)  HR: 76 (10 Mar 2025 08:33) (74 - 82)  BP: 135/63 (10 Mar 2025 08:33) (103/50 - 170/72)  BP(mean): 90 (10 Mar 2025 08:33) (72 - 104)  RR: 17 (10 Mar 2025 08:33) (15 - 18)  SpO2: 98% (10 Mar 2025 08:33) (91% - 98%)    Parameters below as of 10 Mar 2025 08:33  Patient On (Oxygen Delivery Method): room air      Constitutional: Awake, alert  HEENT: Normocephalic, atraumatic, ISSA  Neck: supple, no acanthosis, no thyromegaly or palpable thyroid nodules.  Respiratory: Lungs clear to ausculation bilaterally.   Cardiovascular: regular rate and rhythm  GI: soft, non-tender, non-distended, bowel sounds present  Extremities: No lower extremity edema, peripheral pulses present.     LABS  CBC - WBC/HGB/HTC/PLT: 8.24/9.1/29.3/275 (03-10-25)  BMP: Na/K/Cl/Bicarb/BUN/Cr/Gluc: 133/4.6/93/23/50/8.56/174 (03-10-25)  Anion Gap: 17 (03-10-25)  eGFR: 6 (03-10-25)  Calcium: 8.5 (03-10-25)  Phosphorus: -- (03-10-25)  Magnesium: 2.3 (03-10-25)  LFT - Alb/Tprot/Tbili/Dbili/AlkPhos/ALT/AST: 3.1/--/0.4/--/183/<5/10 (03-10-25)  PT/aPTT/INR: 11.2/31.6/0.97 (03-10-25)  Thyroid Stimulating Hormone, Serum: 0.514 (02-24-25)    CBC - WBC/HGB/HTC/PLT: 8.24/9.1/29.3/275 (03-10-25)BMP: Na/K/Cl/Bicarb/BUN/Cr/Gluc: 133/4.6/93/23/50/8.56/174 (03-10-25)  Anion Gap: 17 (03-10-25)  eGFR: 6 (03-10-25)  Calcium: 8.5 (03-10-25)  Phosphorus: -- (03-10-25)  Magnesium: 2.3 (03-10-25)    CAPILLARY BLOOD GLUCOSE & INSULIN RECEIVED  177 mg/dL (03-09 @ 06:49) 2 + 2  194 mg/dL (03-09 @ 11:21) 2 + 2  203 mg/dL (03-09 @ 17:04) 3 + 4  151 mg/dL (03-09 @ 22:02) lantus 9   166 mg/dL (03-10 @ 07:28) 3 + 2        03-09-25 @ 07:01  -  03-10-25 @ 07:00  --------------------------------------------------------  IN: 430 mL / OUT: 0 mL / NET: 430 mL        ASSESSMENT / RECOMMENDATIONS  RADHA GARCIA is a 84y Male with a past medical history of Type 2 DM, ESRD on HD TTS, coronary artery disease s/p PCI, hypertension, hyperlipidemia, benign prostate hypertrophy, bladder diverticulum, and chronic hip pain who presented to [facility] complaining of right sided chest pain.  Patient was found to have NSTEMI.  He underwent a cardiac cath which showed 3v CAD. Now s/p CABG on 2/26. Endocrinology has been consulted for Diabetes Management.    Home regimen: Glipizide 5mg ER     A1C: 7.7 % (not accurate in dialysis patients)   Creatinine: 8.56, GFR: 6  Weight: 82.4, BMI: 31.2    # Type 2 diabetes mellitus with hyperglycemia  - Please keep lantus   units at bedtime.   - Keep lispro   units before each meal.  - Continue lispro moderate dose sliding scale before meals and at bedtime.  - Patient's fingerstick glucose goal is 100-180 mg/dL.    - Discharge recommendations to be discussed.   - Patient can follow up at discharge with [facility] by calling [phone] to make an appointment.      Case discussed with Dr. Niño. Primary team updated.       Elsy Du   Endocrinology Fellow    Service Pager: [phone]

## 2025-03-10 NOTE — DISCHARGE NOTE PROVIDER - CARE PROVIDERS DIRECT ADDRESSES
[email or web address]

## 2025-03-10 NOTE — PROGRESS NOTE ADULT - ASSESSMENT
85 yo male with a PMHx ESRD 9HD T/Thurs/Sat), CAD 9s/p PCI), T2DM, HTN, HLD, BPH, bladder diverticulum who presented to [facility] with chest pain found with NSTEMI and cardiac cath which revealed 3v CAD. Patient was started on a hep gtt and on 2/24/25, patient was transferred to [facility] under  for further management. On 2/26/25, patient underwent CABG x2 (LIMA-LAD, SVG-OM2), EF normal with . Intra-op, patient received 1.5L LR/ 500cc Albumin/ 2u PRBC, arrived to CTICU intubated on Levo/ Vaso. Got HD. POD1, patient was extubated overnight, remained on Levo and Matias while undergoing HD -2L, started on PO Midodrine 10mg q8hrs while still on pressors, substernal tube removed. POD2, patient lethargic, difficulty with ambulation, continued on pressors for cerebral perfusion. Received HD -2.5L. POD3, mental status improved, got HD ,received 1u PRBC, all drains removed except 1 mediastinal latrice, continued on pressors and Midodrine. POD4, weaned off pressors over night, ambulated well. POD5, started on Lopressor 25mg BID, last mediastinal latrice removed, moderate L pleural effusion on POCUS, got HD. POD6, Lopressor increased, POCUS w/ similar appearing L pleural effusion without window. POD7, Ambulated, received HD session, OT/PT recommending acute rehab, auth started. Prevena removed. Maintain SBP >140 for cerebral profusion. Lopressor stopped and carvedilol 3.25 started. POD8, wires were cut, s/p POCUS to L lung w/ small pleural effusion, no window for drainage and patient remained sating 97% on room air. Auth started for acute rehab. POD9, Patient without complaints, pulling 1500 on IS and sating 97% on room air, received HD. [id number], awaiting auth, ambulating, endocrine consulted as admission A1c 7.7%- team will make adjustments to regimen for dispo tomorrow. POD12 hemodialysis. Still waiting to hear about bed placement at rehab.    Plan:  Neurovascular: No delirium. Pain well controlled with current regimen.  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    Has been A&Ox4 3/5/25-3/10/25, will continue to monitor     Cardiovascular: Hemodynamically stable. HR controlled.  -CAD, s/p CABG x2 (LIMA-LAD, SVG-OM2), EF normal (2/26/25)  C/w ASA, plavix, Coreg 3.125mg BID, Lipitor 40mg daily    C/w ERP   Pulling 1500cc on IS, continuing to work on ambulation with PT  -HLD: c/w Lipitor 40mg daily     Respiratory: 02 Sat = 98% on RA.  -Encourage C+DB and Use of IS 10x / hr while awake.  -CXR: small L pleural effusion   Getting diuresis with HD   S/p bedside POCUS on 3/6/25, small pleural effusion, no need for drainage. Sating well on room air.     GI: Stable.  -PPX: pantoprazole 40mg daily   -PO Diet.  -Transaminitis: no abd pain, trending Alk Phos 183 today, discussed with Dr. Rainey, isael, will continue to monitor   -Bowel regimen: miralax, senna, dulcolax    Renal / :  -Monitor renal function: BUN 50 Cr 8.56  -Monitor I/O's  Nephrology following, appreciate recs  ESRD: Dialysis today 3/10. Got HD on 3/5/25, 3/7/25 - will leave to rehab on MWF schedule   Makes very minimal urine per patient, none documented in chart and on bladder scan, nothing in bladder  Has not been getting diuresed w/ oral meds     Endocrine:    -T2DM (A1c: 7.7%): on home meds, held inpatient  Remains on Lantus 9 units nightly, Lispro 3 units w/ meals, ISS.   Monitoring glucose, adjust regimen prn   On home Glipizide 5mg daily, endocrine consulted given elevated A1c to 7.7% on admission  -TSH: 0.514, no hx    Hematologic:  -CBC: RBC 2.90, Hgb 9.1, Plt 275  -Coagulation Panel: PTT 37.9, PT 11, INR .94    ID:  -Tempature: afebrile   -CBC: WBC 8.24  -Observe for SIRS/Sepsis Syndrome.    Prophylaxis:  -DVT prophylaxis with 5000 SubQ Heparin q8h.  -SCD's, compression stockings    Disposition:  -Telemetry, planned for acute rehab.

## 2025-03-10 NOTE — PROGRESS NOTE ADULT - REASON FOR ADMISSION
Coronary Artery Disease

## 2025-03-10 NOTE — DISCHARGE NOTE NURSING/CASE MANAGEMENT/SOCIAL WORK - NSDCFUADDAPPT_GEN_ALL_CORE_FT
-Please follow up with Dr. Musa when you leave the rehab facility.  Please call us when you are ready to leave rehab and we will schedule the visit.  The office is located at [facility], [facility], 4th floor. Call us with any questions #[phone].    -Please follow up with your cardiologist when you leave the rehab facility as well.      -You will need to have dialysis tomorrow 3/11/25.      -Walk daily as tolerated and use your incentive spirometer every hour.    -No driving or strenuous activity/exercise until cleared by your surgeon.    -Gently clean your incisions with anti-bacterial soap and water, pat dry.  You may leave them open to air.    -Call your doctor if you have shortness of breath, chest pain not relieved by pain medication, dizziness, fever >101.5, or increased redness or drainage from incisions.

## 2025-03-10 NOTE — PROGRESS NOTE ADULT - PROVIDER SPECIALTY LIST ADULT
CT Surgery
CT Surgery
Critical Care
Nephrology
CT Surgery
Critical Care
Nephrology

## 2025-03-10 NOTE — PROGRESS NOTE ADULT - ASSESSMENT
84-year-old M with PMH of ESRD on HD TTS transferred to [facility] for CABG (2/24).  CABG performed on 2/26. Nephrology following for ESRD management and postoperative volume management. Patient to be discharged to [facility]-ESRD on HD TTS s/p CABG:  Last HD on 3/7    On room air, no acidosis, no hyperkalemia  No emergent indications for HD today  Will resume HD tomorrow as per schedule if not discharged already  –Strict I/O monitoring  –Access: RUE AVF    2-BP/Volume management: SBP in the 120s  On carvedilol 3.125 mg    Thank you for consulting Nephrology.    Telly Ramirez MD  PGY-4 Nephrology Fellow

## 2025-03-10 NOTE — PROGRESS NOTE ADULT - ATTENDING COMMENTS
85 yo M with ESRD, s/p  CABG 2/26.  seen on HD- tolerating procedure well    at one point need to flip him back to his TTS schedule - he reports that he will be going home from [facility]  if going [place] okay to keep on MWF schedule- will d/w CTS team  will approach again tomorrow re sat treatment
83 yo M with ESRD, s/p  CABG 2/26.    hemodialysis today for UF and clearances
I agree with the fellow's findings and plans as written above with the following additions/amendments:    Seen and examined at bedside, POCUS with continued mild moderate L pleural effuion small R effusion but no b lines in rest of lung fileds. No acute indication for HD today, improved breathing, requesting rest, will plan on HD 3/5. Discussed with primary team, further recs as above
volume and lytes are acceptable  pt refuses to do HD two days in row so will hold on HD today so can get HD on regular schedule tomorrow
85 yo M with ESRD, s/p  CABG 2/26.  tolerated dialysis well 3/1  BP and volume above goal- additional HD today for UF and clearances  then repeat HD 3/4 to flip back to TTS schedule  reviewed with CTS team
85 yo M with ESRD, s/p  CABG 2/26.  tolerated dialysis well yesterday  repeat HD today to optimize volume status
85 yo M with ESRD, s/p  CABG 2/26.  for repeat HD today  reminded to get standing weights pre and post HD  will need to flip to TTS schedule prior dc

## 2025-03-10 NOTE — DISCHARGE NOTE PROVIDER - NSDCMRMEDTOKEN_GEN_ALL_CORE_FT
acetaminophen 325 mg oral tablet: 2 tab(s) orally every 6 hours As needed Mild Pain (1 - 3)  aspirin 81 mg oral delayed release tablet: 1 tab(s) orally once a day  atorvastatin 40 mg oral tablet: 1 tab(s) orally once a day  carvedilol 3.125 mg oral tablet: 1 tab(s) orally every 12 hours  clopidogrel 75 mg oral tablet: 1 tab(s) orally once a day  heparin: 5,000 unit(s) subcutaneous every 8 hours  insulin glargine 100 units/mL subcutaneous solution: 9 unit(s) subcutaneous once a day (at bedtime)  levETIRAcetam 500 mg oral tablet: 1 tab(s) orally every 12 hours  oxyCODONE 5 mg oral tablet: 1 tab(s) orally every 6 hours As needed Moderate Pain (4 - 6)  pantoprazole 40 mg oral delayed release tablet: 1 tab(s) orally once a day  Tradjenta 5 mg oral tablet: 1 tab(s) orally once a day with a meal

## 2025-03-10 NOTE — DISCHARGE NOTE PROVIDER - HOSPITAL COURSE
85 yo male with a PMHx ESRD 9HD T/Thurs/Sat), CAD s/p PCI), T2DM, HTN, HLD, BPH, bladder diverticulum who presented to [facility] with chest pain found with NSTEMI and cardiac cath which revealed 3v CAD. Patient was started on a hep gtt and on 2/24/25, patient was transferred to [facility] under  for further management. On 2/26/25, patient underwent CABG x2 (LIMA-LAD, SVG-OM2), EF normal with Dr. Musa. Intra-op, patient received 1.5L LR/ 500cc Albumin/ 2u PRBC, arrived to CTICU intubated on Levo/ Vaso. Got HD. POD1, patient was extubated overnight, remained on Levo and Matias while undergoing HD -2L, started on PO Midodrine 10mg q8hrs while still on pressors, substernal tube removed. POD2, patient lethargic, difficulty with ambulation, continued on pressors for cerebral perfusion. Received HD -2.5L. POD3, mental status improved, got HD ,received 1u PRBC, all drains removed except 1 mediastinal latrice, continued on pressors and Midodrine. POD4, weaned off pressors over night, ambulated well. POD5, started on Lopressor 25mg BID, last mediastinal latrice removed, moderate L pleural effusion on POCUS, got HD. POD6, Lopressor increased, POCUS w/ similar appearing L pleural effusion without window. POD7, Ambulated, received HD session, OT/PT recommending acute rehab, auth started. Prevena removed. Maintain SBP >140 for cerebral profusion. Lopressor stopped and carvedilol 3.25 started. POD8, wires were cut, s/p POCUS to L lung w/ small pleural effusion, no window for drainage and patient remained sating 97% on room air. Auth started for acute rehab. POD9, Patient without complaints, pulling 1500 on IS and sating 97% on room air, received HD. [id number], awaiting auth, ambulating, endocrine consulted as admission A1c 7.7%- team will make adjustments to regimen for dispo tomorrow. POD12 hemodialysis. Still waiting to hear about bed placement at rehab.  **Pt  but got a bed at rehab for today, renal said can wait until tomorrow for dialysis to get him back on regular schedule.  Labs and CXR stable.  Waiting for endocrine re: final recs, otherwise stable for discharge today.      GEN: NAD, looks comfortable  Psych: Mood appropriate  Neuro: A&Ox3.  No focal deficits.  Moving all extremities.   HEENT: No obvious abnormalities  CV: S1S2, regular, no murmurs appreciated.  No carotid bruits.  No JVD  Lungs: Clear B/L.  No wheezing, rales or rhonchi  ABD: Soft, non-tender, non-distended.  +Bowel sounds  EXT: Warm and well perfused.  No peripheral edema noted  Musculoskeletal: Moving all extremities with normal ROM, no joint swelling  PV: Pedal pulses palpable

## 2025-03-10 NOTE — DISCHARGE NOTE PROVIDER - CARE PROVIDER_API CALL
Rick Musa  Thoracic and Cardiac Surgery  [address] [address]  Phone: [phone]  Fax: [phone]  Follow Up Time:     Sachin Ferrera  Cardiovascular Disease  [address], [address] [place] [address]  Phone: [phone]  Fax: [phone]  Follow Up Time:

## 2025-03-10 NOTE — DISCHARGE NOTE PROVIDER - NSDCFUADDAPPT_GEN_ALL_CORE_FT
-Please follow up with Dr. Musa when you leave the rehab facility.  Please call us when you are ready to leave rehab and we will schedule the visit.  The office is located at [facility], [facility], 4th floor. Call us with any questions #[phone].    -Please follow up with your cardiologist when you leave the rehab facility as well.      -You will need to have dialysis tomorrow 3/11/25.      -Walk daily as tolerated and use your incentive spirometer every hour.    -No driving or strenuous activity/exercise until cleared by your surgeon.    -Gently clean your incisions with anti-bacterial soap and water, pat dry.  You may leave them open to air.    -Call your doctor if you have shortness of breath, chest pain not relieved by pain medication, dizziness, fever >101.5, or increased redness or drainage from incisions.

## 2025-03-10 NOTE — DISCHARGE NOTE NURSING/CASE MANAGEMENT/SOCIAL WORK - PATIENT PORTAL LINK FT
You can access the FollowMyHealth Patient Portal offered by [facility] by registering at the following website: [email or web address]. By joining [facility]’s FollowMyHealth portal, you will also be able to view your health information using other applications (apps) compatible with our system.

## 2025-03-14 DIAGNOSIS — E78.5 HYPERLIPIDEMIA, UNSPECIFIED: ICD-10-CM

## 2025-03-14 DIAGNOSIS — N18.6 END STAGE RENAL DISEASE: ICD-10-CM

## 2025-03-14 DIAGNOSIS — M10.9 GOUT, UNSPECIFIED: ICD-10-CM

## 2025-03-14 DIAGNOSIS — Z99.2 DEPENDENCE ON RENAL DIALYSIS: ICD-10-CM

## 2025-03-14 DIAGNOSIS — I25.10 ATHEROSCLEROTIC HEART DISEASE OF NATIVE CORONARY ARTERY WITHOUT ANGINA PECTORIS: ICD-10-CM

## 2025-03-14 DIAGNOSIS — E11.22 TYPE 2 DIABETES MELLITUS WITH DIABETIC CHRONIC KIDNEY DISEASE: ICD-10-CM

## 2025-03-14 DIAGNOSIS — J90 PLEURAL EFFUSION, NOT ELSEWHERE CLASSIFIED: ICD-10-CM

## 2025-03-14 DIAGNOSIS — D63.1 ANEMIA IN CHRONIC KIDNEY DISEASE: ICD-10-CM

## 2025-03-14 DIAGNOSIS — Z95.5 PRESENCE OF CORONARY ANGIOPLASTY IMPLANT AND GRAFT: ICD-10-CM

## 2025-03-14 DIAGNOSIS — I21.4 NON-ST ELEVATION (NSTEMI) MYOCARDIAL INFARCTION: ICD-10-CM

## 2025-03-14 DIAGNOSIS — Z79.02 LONG TERM (CURRENT) USE OF ANTITHROMBOTICS/ANTIPLATELETS: ICD-10-CM

## 2025-03-14 DIAGNOSIS — Z79.84 LONG TERM (CURRENT) USE OF ORAL HYPOGLYCEMIC DRUGS: ICD-10-CM

## 2025-03-14 DIAGNOSIS — G97.82 OTHER POSTPROCEDURAL COMPLICATIONS AND DISORDERS OF NERVOUS SYSTEM: ICD-10-CM

## 2025-03-14 DIAGNOSIS — I12.0 HYPERTENSIVE CHRONIC KIDNEY DISEASE WITH STAGE 5 CHRONIC KIDNEY DISEASE OR END STAGE RENAL DISEASE: ICD-10-CM

## 2025-03-14 DIAGNOSIS — E87.70 FLUID OVERLOAD, UNSPECIFIED: ICD-10-CM

## 2025-03-14 DIAGNOSIS — G93.41 METABOLIC ENCEPHALOPATHY: ICD-10-CM

## 2025-03-14 DIAGNOSIS — J95.1 ACUTE PULMONARY INSUFFICIENCY FOLLOWING THORACIC SURGERY: ICD-10-CM

## 2025-03-14 DIAGNOSIS — D62 ACUTE POSTHEMORRHAGIC ANEMIA: ICD-10-CM

## 2025-03-24 ENCOUNTER — TRANSCRIPTION ENCOUNTER (OUTPATIENT)
Age: 85
End: 2025-03-24

## 2025-03-28 ENCOUNTER — TRANSCRIPTION ENCOUNTER (OUTPATIENT)
Age: 85
End: 2025-03-28

## 2025-03-28 PROBLEM — I25.10 CAD (CORONARY ARTERY DISEASE): Status: ACTIVE | Noted: 2025-03-28

## 2025-03-28 PROBLEM — E78.5 HYPERLIPIDEMIA: Status: ACTIVE | Noted: 2025-03-28

## 2025-03-28 PROBLEM — I10 HYPERTENSION: Status: ACTIVE | Noted: 2025-03-28

## 2025-03-28 PROBLEM — N32.3 BLADDER DIVERTICULUM: Status: ACTIVE | Noted: 2025-03-28

## 2025-03-28 PROBLEM — E11.9 DIABETES MELLITUS: Status: ACTIVE | Noted: 2025-03-28

## 2025-03-28 PROBLEM — N40.0 BPH (BENIGN PROSTATIC HYPERPLASIA): Status: ACTIVE | Noted: 2025-03-28

## 2025-03-28 PROBLEM — N18.6 ESRD (END STAGE RENAL DISEASE): Status: ACTIVE | Noted: 2025-03-28

## 2025-03-28 RX ORDER — ATORVASTATIN CALCIUM 40 MG/1
40 TABLET, FILM COATED ORAL
Qty: 30 | Refills: 2 | Status: ACTIVE | COMMUNITY

## 2025-03-28 RX ORDER — CARVEDILOL 3.12 MG/1
3.12 TABLET, FILM COATED ORAL TWICE DAILY
Refills: 0 | Status: ACTIVE | COMMUNITY

## 2025-03-28 RX ORDER — CLOPIDOGREL 75 MG/1
75 TABLET, FILM COATED ORAL DAILY
Refills: 0 | Status: ACTIVE | COMMUNITY

## 2025-03-28 RX ORDER — LEVETIRACETAM 500 MG/1
500 TABLET, FILM COATED ORAL TWICE DAILY
Refills: 0 | Status: ACTIVE | COMMUNITY

## 2025-03-28 RX ORDER — ACETAMINOPHEN 325 MG/1
325 TABLET ORAL EVERY 6 HOURS
Qty: 120 | Refills: 0 | Status: ACTIVE | COMMUNITY

## 2025-03-31 ENCOUNTER — NON-APPOINTMENT (OUTPATIENT)
Age: 85
End: 2025-03-31

## 2025-03-31 ENCOUNTER — OUTPATIENT (OUTPATIENT)
Dept: OUTPATIENT SERVICES | Facility: HOSPITAL | Age: 85
LOS: 1 days | End: 2025-03-31
Payer: MEDICARE

## 2025-03-31 ENCOUNTER — APPOINTMENT (OUTPATIENT)
Dept: CARDIOTHORACIC SURGERY | Facility: CLINIC | Age: 85
End: 2025-03-31
Payer: MEDICARE

## 2025-03-31 VITALS
RESPIRATION RATE: 16 BRPM | OXYGEN SATURATION: 93 % | BODY MASS INDEX: 31.24 KG/M2 | WEIGHT: 183 LBS | TEMPERATURE: 97.7 F | HEART RATE: 99 BPM | SYSTOLIC BLOOD PRESSURE: 132 MMHG | DIASTOLIC BLOOD PRESSURE: 63 MMHG | HEIGHT: 64 IN

## 2025-03-31 VITALS
BODY MASS INDEX: 31.24 KG/M2 | WEIGHT: 183 LBS | DIASTOLIC BLOOD PRESSURE: 63 MMHG | TEMPERATURE: 97.7 F | HEIGHT: 64 IN | HEART RATE: 99 BPM | SYSTOLIC BLOOD PRESSURE: 132 MMHG | OXYGEN SATURATION: 93 %

## 2025-03-31 DIAGNOSIS — Z98.890 OTHER SPECIFIED POSTPROCEDURAL STATES: Chronic | ICD-10-CM

## 2025-03-31 DIAGNOSIS — Z95.1 PRESENCE OF AORTOCORONARY BYPASS GRAFT: ICD-10-CM

## 2025-03-31 DIAGNOSIS — Z90.49 ACQUIRED ABSENCE OF OTHER SPECIFIED PARTS OF DIGESTIVE TRACT: Chronic | ICD-10-CM

## 2025-03-31 PROCEDURE — 99024 POSTOP FOLLOW-UP VISIT: CPT

## 2025-03-31 PROCEDURE — 71046 X-RAY EXAM CHEST 2 VIEWS: CPT

## 2025-03-31 PROCEDURE — 71046 X-RAY EXAM CHEST 2 VIEWS: CPT | Mod: 26

## 2025-03-31 RX ORDER — GABAPENTIN 100 MG/1
100 CAPSULE ORAL
Refills: 0 | Status: ACTIVE | COMMUNITY

## 2025-03-31 RX ORDER — OXYCODONE 5 MG/1
5 TABLET ORAL
Qty: 5 | Refills: 0 | Status: DISCONTINUED | COMMUNITY
End: 2025-03-31

## 2025-03-31 RX ORDER — OMEPRAZOLE 40 MG/1
40 CAPSULE, DELAYED RELEASE ORAL TWICE DAILY
Refills: 0 | Status: ACTIVE | COMMUNITY

## 2025-03-31 RX ORDER — TAMSULOSIN HYDROCHLORIDE 0.4 MG/1
0.4 CAPSULE ORAL
Refills: 0 | Status: ACTIVE | COMMUNITY

## 2025-03-31 RX ORDER — INSULIN GLARGINE 100 [IU]/ML
100 INJECTION, SOLUTION SUBCUTANEOUS
Refills: 0 | Status: DISCONTINUED | COMMUNITY
End: 2025-03-31

## 2025-03-31 RX ORDER — PATIROMER 8.4 G/1
8.4 POWDER, FOR SUSPENSION ORAL
Refills: 0 | Status: ACTIVE | COMMUNITY

## 2025-03-31 RX ORDER — PANTOPRAZOLE 40 MG/1
40 TABLET, DELAYED RELEASE ORAL DAILY
Refills: 0 | Status: DISCONTINUED | COMMUNITY
End: 2025-03-31

## 2025-03-31 RX ORDER — LINAGLIPTIN 5 MG/1
5 TABLET, FILM COATED ORAL DAILY
Refills: 0 | Status: DISCONTINUED | COMMUNITY
End: 2025-03-31

## 2025-03-31 RX ORDER — GLIPIZIDE 5 MG/1
5 TABLET ORAL DAILY
Refills: 0 | Status: ACTIVE | COMMUNITY

## 2025-03-31 RX ORDER — SEVELAMER HYDROCHLORIDE 800 MG/1
800 TABLET, FILM COATED ORAL 3 TIMES DAILY
Refills: 0 | Status: ACTIVE | COMMUNITY

## 2025-03-31 RX ORDER — AMLODIPINE BESYLATE 5 MG/1
5 TABLET ORAL
Refills: 0 | Status: ACTIVE | COMMUNITY

## 2025-04-01 ENCOUNTER — APPOINTMENT (OUTPATIENT)
Dept: CARDIOTHORACIC SURGERY | Facility: CLINIC | Age: 85
End: 2025-04-01
Payer: MEDICARE

## 2025-04-01 DIAGNOSIS — E78.5 HYPERLIPIDEMIA, UNSPECIFIED: ICD-10-CM

## 2025-04-01 DIAGNOSIS — E11.9 TYPE 2 DIABETES MELLITUS W/OUT COMPLICATIONS: ICD-10-CM

## 2025-04-01 DIAGNOSIS — I10 ESSENTIAL (PRIMARY) HYPERTENSION: ICD-10-CM

## 2025-04-01 DIAGNOSIS — N32.3 DIVERTICULUM OF BLADDER: ICD-10-CM

## 2025-04-01 DIAGNOSIS — N18.6 END STAGE RENAL DISEASE: ICD-10-CM

## 2025-04-01 DIAGNOSIS — I25.10 ATHEROSCLEROTIC HEART DISEASE OF NATIVE CORONARY ARTERY W/OUT ANGINA PECTORIS: ICD-10-CM

## 2025-04-01 DIAGNOSIS — N40.0 BENIGN PROSTATIC HYPERPLASIA WITHOUT LOWER URINARY TRACT SYMPMS: ICD-10-CM

## 2025-04-04 ENCOUNTER — TRANSCRIPTION ENCOUNTER (OUTPATIENT)
Age: 85
End: 2025-04-04

## (undated) DEVICE — GLV 7 PROTEXIS

## (undated) DEVICE — SUT PROLENE 3-0 36" SH

## (undated) DEVICE — DRSG KERLIX ROLL 4.5"

## (undated) DEVICE — DRAPE PROBE COVER 5" X 96"

## (undated) DEVICE — CONNECTOR CARDIAC 1:1 FOR HUBLESS DRAINS

## (undated) DEVICE — GOWN LG

## (undated) DEVICE — NDL COUNTER FOAM AND MAGNET 40-70

## (undated) DEVICE — DRAPE U (BLUE) 60 X 72"

## (undated) DEVICE — BLADE SURGICAL #11 CARBON

## (undated) DEVICE — DRSG KLING 4"

## (undated) DEVICE — BLADE SCALPEL SAFETY #11 WITH PLASTIC GREEN HANDLE

## (undated) DEVICE — SUT VICRYL 2-0 27" CT-1

## (undated) DEVICE — INS ST DBL SAFEJAW FGRTY

## (undated) DEVICE — SUT SOFSILK 4-0 18" TIES

## (undated) DEVICE — SUT PLEDGET SOFT LARGE 3/8" X 3/16" X 1/16" X6

## (undated) DEVICE — DRSG 4X4

## (undated) DEVICE — SUT VICRYL 1 36" CTX UNDYED

## (undated) DEVICE — DRAIN JACKSON PRATT 7MM FLAT FULL NO TROCAR

## (undated) DEVICE — SUT PLEDGET SOFT MEDIUM 1/4" X 1/8" X 1/16" X6

## (undated) DEVICE — PACK PROC CV DRAPE

## (undated) DEVICE — PUNCH VASC STD 7.75" HANDLE 4MM DISP

## (undated) DEVICE — DRAPE IOBAN 33" X 23"

## (undated) DEVICE — BLADE SURGICAL #15 CARBON

## (undated) DEVICE — Device

## (undated) DEVICE — GLV 7 PROTEXIS (WHITE)

## (undated) DEVICE — PACK OPEN HEART LNX

## (undated) DEVICE — SUCTION YANKAUER BULBOUS TIP NO VENT

## (undated) DEVICE — BLADE STERN SYS 6 305X1MM

## (undated) DEVICE — DRAIN RESERVOIR FOR JACKSON PRATT 100CC CARDINAL

## (undated) DEVICE — ELCTR BOVIE TIP BLADE INSULATED 3" EDGE

## (undated) DEVICE — SUCTION CATH ARGYLE WHISTLE TIP 14FR STRAIGHT PACKED

## (undated) DEVICE — SUCTION YANKAUER OPEN TIP NO VENT CURVE

## (undated) DEVICE — SUT PDS II 2-0 27" CT-1

## (undated) DEVICE — SUT SILK 5-0 60" TIES

## (undated) DEVICE — SUT NUROLON 1 18" OS-8 (POP-OFF)

## (undated) DEVICE — CATH IV SAFE BC 24G X 0.75" (YELLOW)

## (undated) DEVICE — VASOVIEW HEMOPRO 2

## (undated) DEVICE — SUT SOFSILK 2-0 18" TIES

## (undated) DEVICE — POSITIONER FOAM EGG CRATE ULNAR 2PCS (PINK)

## (undated) DEVICE — SUT ETHIBOND 0 18" TIES

## (undated) DEVICE — ELCTR BOVIE TIP BLADE INSULATED 4" EDGE

## (undated) DEVICE — SUT PROLENE 7-0 24" BV175-6

## (undated) DEVICE — VASOVIEW HEMOPRO ENDO SYSTEM

## (undated) DEVICE — TUBING SUCTION NONCONDUCTIVE 6MM X 12FT

## (undated) DEVICE — FOLEY TRAY 16FR 5CC LF LUBRISIL ADVANCE TEMP CLOSED

## (undated) DEVICE — DRSG MEPILEX 10 X 25CM (4 X 10") AG

## (undated) DEVICE — SUT SILK 2-0 18" SH (POP-OFF)

## (undated) DEVICE — SOL IRR POUR H2O 1500ML

## (undated) DEVICE — PREP SCRUB BRUSH W CHG 4%

## (undated) DEVICE — DRSG TRACH DRAINAGE 4X4

## (undated) DEVICE — SUCTION CATH AIRLIFE CONTROL VALVE TRIFLO 14FR

## (undated) DEVICE — SUT SURGIPRO II 6-0 30" CV-1 DA

## (undated) DEVICE — SUT DOUBLE 6 WIRE STERNAL

## (undated) DEVICE — WRAP COMPRESSION CALF MED

## (undated) DEVICE — CATH NG SALEM SUMP 16FR

## (undated) DEVICE — DRSG ACE BANDAGE 6"

## (undated) DEVICE — CATH TRIOX OXIMETRY 8F 3 LUMENS

## (undated) DEVICE — SUT POLYSORB 3-0 30" V-20 UNDYED

## (undated) DEVICE — VENODYNE/SCD SLEEVE CALF MEDIUM

## (undated) DEVICE — BLOWER MISTER AXIUS WITH IV SET

## (undated) DEVICE — CLIPPER BLADE GENERAL USE

## (undated) DEVICE — SUT ETHIBOND 4-0 12-18"

## (undated) DEVICE — SUT PROLENE 3-0 36" RB-1 HS-5

## (undated) DEVICE — TOURNIQUET SET 12FR (1 RED, 2 BLUE, 3 CLEAR, 1 SNARE) 5.5"

## (undated) DEVICE — DRAPE SLUSH / WARMER 44 X 66"

## (undated) DEVICE — PAD NERVE PHRENIC NERVE

## (undated) DEVICE — CATH IV SAFE 18GX1.16

## (undated) DEVICE — SUT ETHIBOND 3-0 36" RB-1

## (undated) DEVICE — PACING CABLE (BROWN) A/V TEMP SCREW DOWN 12FT

## (undated) DEVICE — SUT MONOSOF 4-0 18" P-12

## (undated) DEVICE — SYNOVIS VASCULAR PROBE 1.5MM 15CM

## (undated) DEVICE — ELCTR GROUNDING PAD ADULT COVIDIEN

## (undated) DEVICE — SUT PROLENE 4-0 36" RB-1

## (undated) DEVICE — TONGUE DEPRESSOR

## (undated) DEVICE — CARDIOPLEGIA MANAGEMENT SET COLOR CODED CLAMPS

## (undated) DEVICE — BLADE SCALPEL SAFETY #10 WITH PLASTIC GREEN HANDLE

## (undated) DEVICE — STAPLER SKIN MULTI DIRECTION W35

## (undated) DEVICE — SOL IRR POUR NS 0.9% 1500ML

## (undated) DEVICE — SUT PDS PLUS 0 27" CT-1

## (undated) DEVICE — CHEST DRAIN PLEUR-EVAC DRY/WET ADULT-PEDS SINGLE (QUICK)

## (undated) DEVICE — DRSG BIOPATCH DISK W CHG 1" W 4.0MM HOLE

## (undated) DEVICE — FOR-ESU VALLEYLAB T7E14982DX: Type: DURABLE MEDICAL EQUIPMENT

## (undated) DEVICE — DRSG DERMABOND 0.7ML

## (undated) DEVICE — CATH CV TRAY INSR ST UNIV

## (undated) DEVICE — DRAPE TOWEL BLUE 17" X 24"

## (undated) DEVICE — RIGID ADULT SUCKER

## (undated) DEVICE — SUT MONOSOF 3-0 18" P-12

## (undated) DEVICE — TUBING SMOKE EVAC 3/8" X 10FT FOR NEPTUNE

## (undated) DEVICE — SUT PROLENE 6-0 30" BV-1

## (undated) DEVICE — PACK PROCEDURE HARVEST SMARTPREP APC-60

## (undated) DEVICE — DRAPE LIGHT HANDLE COVER BLUE

## (undated) DEVICE — STAPLER SKIN VISI-STAT 35 WIDE

## (undated) DEVICE — DRSG XEROFORM 5"

## (undated) DEVICE — PACK AV FISTULA

## (undated) DEVICE — TUBING STRYKER PNEUMOCLEAR HIGH FLOW

## (undated) DEVICE — SUT SOFSILK 3-0 18" TIES

## (undated) DEVICE — VESSEL LOOP MAXI-BLUE 0.120" X 16"

## (undated) DEVICE — SUT STAINLESS STEEL 7 4-18" CCS

## (undated) DEVICE — ELCTR STRYKER NEPTUNE SMOKE EVACUATION PENCIL (GREEN)

## (undated) DEVICE — SOL ANTI FOG (FRED)

## (undated) DEVICE — SUT MONOCRYL 4-0 27" PS-2 UNDYED

## (undated) DEVICE — SUMP INTRACARDIAC/PERICARDIAL 20FR 1/4" ADULT

## (undated) DEVICE — DRSG PREVENA PEEL & PLACE KIT 20CM